# Patient Record
Sex: FEMALE | Race: WHITE | NOT HISPANIC OR LATINO
[De-identification: names, ages, dates, MRNs, and addresses within clinical notes are randomized per-mention and may not be internally consistent; named-entity substitution may affect disease eponyms.]

---

## 2017-10-10 ENCOUNTER — APPOINTMENT (OUTPATIENT)
Dept: CT IMAGING | Facility: CLINIC | Age: 67
End: 2017-10-10

## 2017-10-10 ENCOUNTER — OUTPATIENT (OUTPATIENT)
Dept: OUTPATIENT SERVICES | Facility: HOSPITAL | Age: 67
LOS: 1 days | End: 2017-10-10
Payer: MEDICARE

## 2017-10-10 DIAGNOSIS — Z00.8 ENCOUNTER FOR OTHER GENERAL EXAMINATION: ICD-10-CM

## 2017-10-10 PROCEDURE — G0297: CPT | Mod: 26

## 2017-10-10 PROCEDURE — G0297: CPT

## 2017-11-16 ENCOUNTER — APPOINTMENT (OUTPATIENT)
Dept: MAMMOGRAPHY | Facility: HOSPITAL | Age: 67
End: 2017-11-16
Payer: MEDICARE

## 2017-11-16 ENCOUNTER — OUTPATIENT (OUTPATIENT)
Dept: OUTPATIENT SERVICES | Facility: HOSPITAL | Age: 67
LOS: 1 days | End: 2017-11-16
Payer: MEDICARE

## 2017-11-16 PROCEDURE — 77067 SCR MAMMO BI INCL CAD: CPT

## 2017-11-16 PROCEDURE — 77063 BREAST TOMOSYNTHESIS BI: CPT

## 2017-11-16 PROCEDURE — G0202: CPT | Mod: 26

## 2017-11-16 PROCEDURE — 77063 BREAST TOMOSYNTHESIS BI: CPT | Mod: 26

## 2017-11-17 ENCOUNTER — APPOINTMENT (OUTPATIENT)
Dept: MAMMOGRAPHY | Facility: HOSPITAL | Age: 67
End: 2017-11-17
Payer: MEDICARE

## 2017-11-17 ENCOUNTER — OUTPATIENT (OUTPATIENT)
Dept: OUTPATIENT SERVICES | Facility: HOSPITAL | Age: 67
LOS: 1 days | End: 2017-11-17
Payer: MEDICARE

## 2017-11-17 PROCEDURE — 77065 DX MAMMO INCL CAD UNI: CPT

## 2017-11-17 PROCEDURE — G0279: CPT | Mod: 26

## 2017-11-17 PROCEDURE — G0206: CPT | Mod: 26,LT

## 2017-11-17 PROCEDURE — G0279: CPT

## 2017-11-29 DIAGNOSIS — Z00.8 ENCOUNTER FOR OTHER GENERAL EXAMINATION: ICD-10-CM

## 2017-11-30 ENCOUNTER — RESULT REVIEW (OUTPATIENT)
Age: 67
End: 2017-11-30

## 2017-11-30 ENCOUNTER — APPOINTMENT (OUTPATIENT)
Dept: MAMMOGRAPHY | Facility: CLINIC | Age: 67
End: 2017-11-30
Payer: MEDICARE

## 2017-11-30 ENCOUNTER — OUTPATIENT (OUTPATIENT)
Dept: OUTPATIENT SERVICES | Facility: HOSPITAL | Age: 67
LOS: 1 days | End: 2017-11-30
Payer: MEDICARE

## 2017-11-30 DIAGNOSIS — Z00.8 ENCOUNTER FOR OTHER GENERAL EXAMINATION: ICD-10-CM

## 2017-11-30 PROCEDURE — G0206: CPT | Mod: 26,LT

## 2017-11-30 PROCEDURE — 77065 DX MAMMO INCL CAD UNI: CPT

## 2017-11-30 PROCEDURE — 19081 BX BREAST 1ST LESION STRTCTC: CPT

## 2017-11-30 PROCEDURE — 19082 BX BREAST ADD LESION STRTCTC: CPT | Mod: LT

## 2017-11-30 PROCEDURE — A4648: CPT

## 2017-11-30 PROCEDURE — 19082 BX BREAST ADD LESION STRTCTC: CPT

## 2017-11-30 PROCEDURE — A9586: CPT

## 2017-11-30 PROCEDURE — 19081 BX BREAST 1ST LESION STRTCTC: CPT | Mod: LT

## 2018-04-30 ENCOUNTER — APPOINTMENT (OUTPATIENT)
Dept: RADIOLOGY | Facility: CLINIC | Age: 68
End: 2018-04-30
Payer: MEDICARE

## 2018-04-30 ENCOUNTER — APPOINTMENT (OUTPATIENT)
Dept: CT IMAGING | Facility: CLINIC | Age: 68
End: 2018-04-30
Payer: MEDICARE

## 2018-04-30 ENCOUNTER — OUTPATIENT (OUTPATIENT)
Dept: OUTPATIENT SERVICES | Facility: HOSPITAL | Age: 68
LOS: 1 days | End: 2018-04-30
Payer: MEDICARE

## 2018-04-30 DIAGNOSIS — Z00.8 ENCOUNTER FOR OTHER GENERAL EXAMINATION: ICD-10-CM

## 2018-04-30 PROCEDURE — 74176 CT ABD & PELVIS W/O CONTRAST: CPT

## 2018-04-30 PROCEDURE — 74018 RADEX ABDOMEN 1 VIEW: CPT | Mod: 26

## 2018-04-30 PROCEDURE — 74176 CT ABD & PELVIS W/O CONTRAST: CPT | Mod: 26

## 2018-04-30 PROCEDURE — 74018 RADEX ABDOMEN 1 VIEW: CPT

## 2018-06-11 ENCOUNTER — OUTPATIENT (OUTPATIENT)
Dept: OUTPATIENT SERVICES | Facility: HOSPITAL | Age: 68
LOS: 1 days | End: 2018-06-11
Payer: MEDICARE

## 2018-06-11 ENCOUNTER — APPOINTMENT (OUTPATIENT)
Dept: MAMMOGRAPHY | Facility: CLINIC | Age: 68
End: 2018-06-11

## 2018-06-11 DIAGNOSIS — Z00.8 ENCOUNTER FOR OTHER GENERAL EXAMINATION: ICD-10-CM

## 2018-06-11 PROCEDURE — 77065 DX MAMMO INCL CAD UNI: CPT | Mod: 26,LT

## 2018-06-11 PROCEDURE — 77065 DX MAMMO INCL CAD UNI: CPT

## 2018-10-04 ENCOUNTER — APPOINTMENT (OUTPATIENT)
Dept: CT IMAGING | Facility: CLINIC | Age: 68
End: 2018-10-04

## 2018-10-11 ENCOUNTER — OUTPATIENT (OUTPATIENT)
Dept: OUTPATIENT SERVICES | Facility: HOSPITAL | Age: 68
LOS: 1 days | End: 2018-10-11
Payer: MEDICARE

## 2018-10-11 ENCOUNTER — APPOINTMENT (OUTPATIENT)
Dept: CT IMAGING | Facility: CLINIC | Age: 68
End: 2018-10-11
Payer: MEDICARE

## 2018-10-11 DIAGNOSIS — Z00.8 ENCOUNTER FOR OTHER GENERAL EXAMINATION: ICD-10-CM

## 2018-10-11 PROCEDURE — G0297: CPT | Mod: 26

## 2018-10-11 PROCEDURE — G0297: CPT

## 2018-11-29 ENCOUNTER — APPOINTMENT (OUTPATIENT)
Dept: MAMMOGRAPHY | Facility: CLINIC | Age: 68
End: 2018-11-29
Payer: MEDICARE

## 2018-11-29 ENCOUNTER — OUTPATIENT (OUTPATIENT)
Dept: OUTPATIENT SERVICES | Facility: HOSPITAL | Age: 68
LOS: 1 days | End: 2018-11-29
Payer: MEDICARE

## 2018-11-29 DIAGNOSIS — Z00.8 ENCOUNTER FOR OTHER GENERAL EXAMINATION: ICD-10-CM

## 2018-11-29 PROCEDURE — 76641 ULTRASOUND BREAST COMPLETE: CPT | Mod: 26,50

## 2018-11-29 PROCEDURE — 77063 BREAST TOMOSYNTHESIS BI: CPT

## 2018-11-29 PROCEDURE — 77067 SCR MAMMO BI INCL CAD: CPT | Mod: 26

## 2018-11-29 PROCEDURE — 77063 BREAST TOMOSYNTHESIS BI: CPT | Mod: 26

## 2018-11-29 PROCEDURE — 77067 SCR MAMMO BI INCL CAD: CPT

## 2018-11-29 PROCEDURE — 76641 ULTRASOUND BREAST COMPLETE: CPT

## 2019-10-16 ENCOUNTER — APPOINTMENT (OUTPATIENT)
Dept: CT IMAGING | Facility: CLINIC | Age: 69
End: 2019-10-16
Payer: MEDICARE

## 2019-10-16 ENCOUNTER — OUTPATIENT (OUTPATIENT)
Dept: OUTPATIENT SERVICES | Facility: HOSPITAL | Age: 69
LOS: 1 days | End: 2019-10-16
Payer: MEDICARE

## 2019-10-16 VITALS — HEIGHT: 62 IN | WEIGHT: 158 LBS | BODY MASS INDEX: 29.08 KG/M2

## 2019-10-16 DIAGNOSIS — Z00.8 ENCOUNTER FOR OTHER GENERAL EXAMINATION: ICD-10-CM

## 2019-10-16 PROCEDURE — 99406 BEHAV CHNG SMOKING 3-10 MIN: CPT

## 2019-10-16 PROCEDURE — G0296 VISIT TO DETERM LDCT ELIG: CPT

## 2019-10-16 PROCEDURE — G0297: CPT

## 2019-10-16 PROCEDURE — G0297: CPT | Mod: 26

## 2020-01-31 ENCOUNTER — OUTPATIENT (OUTPATIENT)
Dept: OUTPATIENT SERVICES | Facility: HOSPITAL | Age: 70
LOS: 1 days | End: 2020-01-31
Payer: MEDICARE

## 2020-01-31 ENCOUNTER — APPOINTMENT (OUTPATIENT)
Dept: ULTRASOUND IMAGING | Facility: CLINIC | Age: 70
End: 2020-01-31
Payer: MEDICARE

## 2020-01-31 ENCOUNTER — APPOINTMENT (OUTPATIENT)
Dept: MAMMOGRAPHY | Facility: CLINIC | Age: 70
End: 2020-01-31
Payer: MEDICARE

## 2020-01-31 DIAGNOSIS — Z00.8 ENCOUNTER FOR OTHER GENERAL EXAMINATION: ICD-10-CM

## 2020-01-31 PROCEDURE — 76641 ULTRASOUND BREAST COMPLETE: CPT | Mod: 26,50

## 2020-01-31 PROCEDURE — 77063 BREAST TOMOSYNTHESIS BI: CPT

## 2020-01-31 PROCEDURE — 77063 BREAST TOMOSYNTHESIS BI: CPT | Mod: 26

## 2020-01-31 PROCEDURE — 77067 SCR MAMMO BI INCL CAD: CPT | Mod: 26

## 2020-01-31 PROCEDURE — 76641 ULTRASOUND BREAST COMPLETE: CPT

## 2020-01-31 PROCEDURE — 77067 SCR MAMMO BI INCL CAD: CPT

## 2021-06-22 ENCOUNTER — APPOINTMENT (OUTPATIENT)
Dept: ULTRASOUND IMAGING | Facility: CLINIC | Age: 71
End: 2021-06-22

## 2021-06-22 ENCOUNTER — APPOINTMENT (OUTPATIENT)
Dept: MAMMOGRAPHY | Facility: CLINIC | Age: 71
End: 2021-06-22

## 2021-06-22 ENCOUNTER — OUTPATIENT (OUTPATIENT)
Dept: OUTPATIENT SERVICES | Facility: HOSPITAL | Age: 71
LOS: 1 days | End: 2021-06-22
Payer: MEDICARE

## 2021-06-22 DIAGNOSIS — Z00.8 ENCOUNTER FOR OTHER GENERAL EXAMINATION: ICD-10-CM

## 2021-06-22 PROCEDURE — 76641 ULTRASOUND BREAST COMPLETE: CPT

## 2021-06-22 PROCEDURE — 77063 BREAST TOMOSYNTHESIS BI: CPT | Mod: 26

## 2021-06-22 PROCEDURE — 77067 SCR MAMMO BI INCL CAD: CPT | Mod: 26

## 2021-06-22 PROCEDURE — 77063 BREAST TOMOSYNTHESIS BI: CPT

## 2021-06-22 PROCEDURE — 77067 SCR MAMMO BI INCL CAD: CPT

## 2021-06-22 PROCEDURE — 76641 ULTRASOUND BREAST COMPLETE: CPT | Mod: 26,50

## 2022-06-06 ENCOUNTER — OUTPATIENT (OUTPATIENT)
Dept: OUTPATIENT SERVICES | Facility: HOSPITAL | Age: 72
LOS: 1 days | End: 2022-06-06
Payer: MEDICARE

## 2022-06-06 ENCOUNTER — APPOINTMENT (OUTPATIENT)
Dept: RADIOLOGY | Facility: HOSPITAL | Age: 72
End: 2022-06-06
Payer: MEDICARE

## 2022-06-06 DIAGNOSIS — Z00.8 ENCOUNTER FOR OTHER GENERAL EXAMINATION: ICD-10-CM

## 2022-06-06 PROCEDURE — 73562 X-RAY EXAM OF KNEE 3: CPT

## 2022-06-06 PROCEDURE — 73562 X-RAY EXAM OF KNEE 3: CPT | Mod: 26,RT

## 2022-08-02 ENCOUNTER — OUTPATIENT (OUTPATIENT)
Dept: OUTPATIENT SERVICES | Facility: HOSPITAL | Age: 72
LOS: 1 days | End: 2022-08-02
Payer: MEDICARE

## 2022-08-02 ENCOUNTER — APPOINTMENT (OUTPATIENT)
Dept: ULTRASOUND IMAGING | Facility: CLINIC | Age: 72
End: 2022-08-02

## 2022-08-02 ENCOUNTER — APPOINTMENT (OUTPATIENT)
Dept: MAMMOGRAPHY | Facility: CLINIC | Age: 72
End: 2022-08-02

## 2022-08-02 DIAGNOSIS — Z00.8 ENCOUNTER FOR OTHER GENERAL EXAMINATION: ICD-10-CM

## 2022-08-02 PROCEDURE — 77063 BREAST TOMOSYNTHESIS BI: CPT

## 2022-08-02 PROCEDURE — 76641 ULTRASOUND BREAST COMPLETE: CPT | Mod: 26,50

## 2022-08-02 PROCEDURE — 77067 SCR MAMMO BI INCL CAD: CPT

## 2022-08-02 PROCEDURE — 77067 SCR MAMMO BI INCL CAD: CPT | Mod: 26

## 2022-08-02 PROCEDURE — 76641 ULTRASOUND BREAST COMPLETE: CPT

## 2022-08-02 PROCEDURE — 77063 BREAST TOMOSYNTHESIS BI: CPT | Mod: 26

## 2022-09-22 ENCOUNTER — INPATIENT (INPATIENT)
Facility: HOSPITAL | Age: 72
LOS: 14 days | Discharge: HOME | DRG: 545 | End: 2022-10-07
Attending: STUDENT IN AN ORGANIZED HEALTH CARE EDUCATION/TRAINING PROGRAM | Admitting: STUDENT IN AN ORGANIZED HEALTH CARE EDUCATION/TRAINING PROGRAM
Payer: MEDICARE

## 2022-09-22 VITALS
RESPIRATION RATE: 45 BRPM | HEIGHT: 65 IN | SYSTOLIC BLOOD PRESSURE: 209 MMHG | OXYGEN SATURATION: 98 % | HEART RATE: 115 BPM | WEIGHT: 145.06 LBS | DIASTOLIC BLOOD PRESSURE: 96 MMHG

## 2022-09-22 DIAGNOSIS — I50.9 HEART FAILURE, UNSPECIFIED: ICD-10-CM

## 2022-09-22 LAB
ALBUMIN SERPL ELPH-MCNC: 4 G/DL — SIGNIFICANT CHANGE UP (ref 3.3–5)
ALP SERPL-CCNC: 65 U/L — SIGNIFICANT CHANGE UP (ref 30–120)
ALT FLD-CCNC: 26 U/L DA — SIGNIFICANT CHANGE UP (ref 10–60)
ANION GAP SERPL CALC-SCNC: 9 MMOL/L — SIGNIFICANT CHANGE UP (ref 5–17)
APPEARANCE UR: CLEAR — SIGNIFICANT CHANGE UP
AST SERPL-CCNC: 25 U/L — SIGNIFICANT CHANGE UP (ref 10–40)
BASOPHILS # BLD AUTO: 0.01 K/UL — SIGNIFICANT CHANGE UP (ref 0–0.2)
BASOPHILS NFR BLD AUTO: 0.1 % — SIGNIFICANT CHANGE UP (ref 0–2)
BILIRUB SERPL-MCNC: 0.4 MG/DL — SIGNIFICANT CHANGE UP (ref 0.2–1.2)
BILIRUB UR-MCNC: NEGATIVE — SIGNIFICANT CHANGE UP
BUN SERPL-MCNC: 61 MG/DL — HIGH (ref 7–23)
CALCIUM SERPL-MCNC: 9.3 MG/DL — SIGNIFICANT CHANGE UP (ref 8.4–10.5)
CHLORIDE SERPL-SCNC: 101 MMOL/L — SIGNIFICANT CHANGE UP (ref 96–108)
CO2 SERPL-SCNC: 24 MMOL/L — SIGNIFICANT CHANGE UP (ref 22–31)
COLOR SPEC: YELLOW — SIGNIFICANT CHANGE UP
CREAT SERPL-MCNC: 2.8 MG/DL — HIGH (ref 0.5–1.3)
CRP SERPL-MCNC: <3 MG/L — SIGNIFICANT CHANGE UP
D DIMER BLD IA.RAPID-MCNC: 241 NG/ML DDU — HIGH
DIFF PNL FLD: NEGATIVE — SIGNIFICANT CHANGE UP
EGFR: 17 ML/MIN/1.73M2 — LOW
EOSINOPHIL # BLD AUTO: 0.19 K/UL — SIGNIFICANT CHANGE UP (ref 0–0.5)
EOSINOPHIL NFR BLD AUTO: 1.9 % — SIGNIFICANT CHANGE UP (ref 0–6)
ERYTHROCYTE [SEDIMENTATION RATE] IN BLOOD: 41 MM/HR — HIGH (ref 0–20)
GLUCOSE SERPL-MCNC: 137 MG/DL — HIGH (ref 70–99)
GLUCOSE UR QL: 50 MG/DL
HCT VFR BLD CALC: 37.8 % — SIGNIFICANT CHANGE UP (ref 34.5–45)
HGB BLD-MCNC: 12.3 G/DL — SIGNIFICANT CHANGE UP (ref 11.5–15.5)
IMM GRANULOCYTES NFR BLD AUTO: 0.3 % — SIGNIFICANT CHANGE UP (ref 0–0.9)
KETONES UR-MCNC: NEGATIVE — SIGNIFICANT CHANGE UP
LACTATE SERPL-SCNC: 0.3 MMOL/L — LOW (ref 0.7–2)
LACTATE SERPL-SCNC: 2.4 MMOL/L — HIGH (ref 0.7–2)
LEUKOCYTE ESTERASE UR-ACNC: NEGATIVE — SIGNIFICANT CHANGE UP
LYMPHOCYTES # BLD AUTO: 3.46 K/UL — HIGH (ref 1–3.3)
LYMPHOCYTES # BLD AUTO: 35 % — SIGNIFICANT CHANGE UP (ref 13–44)
MAGNESIUM SERPL-MCNC: 2.2 MG/DL — SIGNIFICANT CHANGE UP (ref 1.6–2.6)
MCHC RBC-ENTMCNC: 32.2 PG — SIGNIFICANT CHANGE UP (ref 27–34)
MCHC RBC-ENTMCNC: 32.5 GM/DL — SIGNIFICANT CHANGE UP (ref 32–36)
MCV RBC AUTO: 99 FL — SIGNIFICANT CHANGE UP (ref 80–100)
MONOCYTES # BLD AUTO: 0.86 K/UL — SIGNIFICANT CHANGE UP (ref 0–0.9)
MONOCYTES NFR BLD AUTO: 8.7 % — SIGNIFICANT CHANGE UP (ref 2–14)
NEUTROPHILS # BLD AUTO: 5.34 K/UL — SIGNIFICANT CHANGE UP (ref 1.8–7.4)
NEUTROPHILS NFR BLD AUTO: 54 % — SIGNIFICANT CHANGE UP (ref 43–77)
NITRITE UR-MCNC: NEGATIVE — SIGNIFICANT CHANGE UP
NRBC # BLD: 0 /100 WBCS — SIGNIFICANT CHANGE UP (ref 0–0)
NT-PROBNP SERPL-SCNC: HIGH PG/ML (ref 0–125)
PH UR: 6 — SIGNIFICANT CHANGE UP (ref 5–8)
PLATELET # BLD AUTO: 256 K/UL — SIGNIFICANT CHANGE UP (ref 150–400)
POTASSIUM SERPL-MCNC: 5.7 MMOL/L — HIGH (ref 3.5–5.3)
POTASSIUM SERPL-SCNC: 5.7 MMOL/L — HIGH (ref 3.5–5.3)
PROT SERPL-MCNC: 8.9 G/DL — HIGH (ref 6–8.3)
PROT UR-MCNC: 15 MG/DL
RAPID RVP RESULT: SIGNIFICANT CHANGE UP
RBC # BLD: 3.82 M/UL — SIGNIFICANT CHANGE UP (ref 3.8–5.2)
RBC # FLD: 14.9 % — HIGH (ref 10.3–14.5)
SARS-COV-2 RNA SPEC QL NAA+PROBE: SIGNIFICANT CHANGE UP
SODIUM SERPL-SCNC: 134 MMOL/L — LOW (ref 135–145)
SP GR SPEC: 1 — LOW (ref 1.01–1.02)
TROPONIN I, HIGH SENSITIVITY RESULT: 192.7 NG/L — HIGH
TROPONIN I, HIGH SENSITIVITY RESULT: 95.1 NG/L — HIGH
TSH SERPL-MCNC: 16.4 UIU/ML — HIGH (ref 0.27–4.2)
UROBILINOGEN FLD QL: NEGATIVE MG/DL — SIGNIFICANT CHANGE UP
WBC # BLD: 9.89 K/UL — SIGNIFICANT CHANGE UP (ref 3.8–10.5)
WBC # FLD AUTO: 9.89 K/UL — SIGNIFICANT CHANGE UP (ref 3.8–10.5)

## 2022-09-22 PROCEDURE — 99285 EMERGENCY DEPT VISIT HI MDM: CPT

## 2022-09-22 PROCEDURE — 71250 CT THORAX DX C-: CPT | Mod: 26,MA

## 2022-09-22 PROCEDURE — 99223 1ST HOSP IP/OBS HIGH 75: CPT

## 2022-09-22 PROCEDURE — 76775 US EXAM ABDO BACK WALL LIM: CPT | Mod: 26

## 2022-09-22 PROCEDURE — 74176 CT ABD & PELVIS W/O CONTRAST: CPT | Mod: 26,MA

## 2022-09-22 PROCEDURE — 71045 X-RAY EXAM CHEST 1 VIEW: CPT | Mod: 26

## 2022-09-22 PROCEDURE — 93306 TTE W/DOPPLER COMPLETE: CPT | Mod: 26

## 2022-09-22 RX ORDER — SODIUM CHLORIDE 9 MG/ML
1000 INJECTION INTRAMUSCULAR; INTRAVENOUS; SUBCUTANEOUS ONCE
Refills: 0 | Status: COMPLETED | OUTPATIENT
Start: 2022-09-22 | End: 2022-09-22

## 2022-09-22 RX ORDER — IPRATROPIUM/ALBUTEROL SULFATE 18-103MCG
3 AEROSOL WITH ADAPTER (GRAM) INHALATION EVERY 8 HOURS
Refills: 0 | Status: DISCONTINUED | OUTPATIENT
Start: 2022-09-22 | End: 2022-09-29

## 2022-09-22 RX ORDER — IPRATROPIUM/ALBUTEROL SULFATE 18-103MCG
3 AEROSOL WITH ADAPTER (GRAM) INHALATION
Refills: 0 | Status: DISCONTINUED | OUTPATIENT
Start: 2022-09-22 | End: 2022-09-29

## 2022-09-22 RX ORDER — CALCIUM GLUCONATE 100 MG/ML
1 VIAL (ML) INTRAVENOUS ONCE
Refills: 0 | Status: COMPLETED | OUTPATIENT
Start: 2022-09-22 | End: 2022-09-22

## 2022-09-22 RX ORDER — FUROSEMIDE 40 MG
80 TABLET ORAL ONCE
Refills: 0 | Status: COMPLETED | OUTPATIENT
Start: 2022-09-22 | End: 2022-09-22

## 2022-09-22 RX ORDER — LEVOTHYROXINE SODIUM 125 MCG
75 TABLET ORAL DAILY
Refills: 0 | Status: DISCONTINUED | OUTPATIENT
Start: 2022-09-22 | End: 2022-09-29

## 2022-09-22 RX ORDER — NITROGLYCERIN 6.5 MG
0.4 CAPSULE, EXTENDED RELEASE ORAL
Refills: 0 | Status: DISCONTINUED | OUTPATIENT
Start: 2022-09-22 | End: 2022-09-29

## 2022-09-22 RX ORDER — NICOTINE POLACRILEX 2 MG
1 GUM BUCCAL DAILY
Refills: 0 | Status: DISCONTINUED | OUTPATIENT
Start: 2022-09-22 | End: 2022-09-29

## 2022-09-22 RX ORDER — FUROSEMIDE 40 MG
40 TABLET ORAL DAILY
Refills: 0 | Status: DISCONTINUED | OUTPATIENT
Start: 2022-09-23 | End: 2022-09-23

## 2022-09-22 RX ORDER — HYDRALAZINE HCL 50 MG
10 TABLET ORAL EVERY 6 HOURS
Refills: 0 | Status: DISCONTINUED | OUTPATIENT
Start: 2022-09-22 | End: 2022-09-23

## 2022-09-22 RX ORDER — ASPIRIN/CALCIUM CARB/MAGNESIUM 324 MG
162 TABLET ORAL ONCE
Refills: 0 | Status: DISCONTINUED | OUTPATIENT
Start: 2022-09-22 | End: 2022-09-22

## 2022-09-22 RX ORDER — INSULIN HUMAN 100 [IU]/ML
6 INJECTION, SOLUTION SUBCUTANEOUS ONCE
Refills: 0 | Status: COMPLETED | OUTPATIENT
Start: 2022-09-22 | End: 2022-09-22

## 2022-09-22 RX ORDER — IPRATROPIUM/ALBUTEROL SULFATE 18-103MCG
3 AEROSOL WITH ADAPTER (GRAM) INHALATION ONCE
Refills: 0 | Status: COMPLETED | OUTPATIENT
Start: 2022-09-22 | End: 2022-09-22

## 2022-09-22 RX ORDER — HEPARIN SODIUM 5000 [USP'U]/ML
5000 INJECTION INTRAVENOUS; SUBCUTANEOUS EVERY 8 HOURS
Refills: 0 | Status: DISCONTINUED | OUTPATIENT
Start: 2022-09-22 | End: 2022-09-29

## 2022-09-22 RX ORDER — DEXTROSE 50 % IN WATER 50 %
50 SYRINGE (ML) INTRAVENOUS ONCE
Refills: 0 | Status: COMPLETED | OUTPATIENT
Start: 2022-09-22 | End: 2022-09-22

## 2022-09-22 RX ORDER — ACETAMINOPHEN 500 MG
650 TABLET ORAL EVERY 6 HOURS
Refills: 0 | Status: DISCONTINUED | OUTPATIENT
Start: 2022-09-22 | End: 2022-09-29

## 2022-09-22 RX ORDER — ASPIRIN/CALCIUM CARB/MAGNESIUM 324 MG
324 TABLET ORAL ONCE
Refills: 0 | Status: DISCONTINUED | OUTPATIENT
Start: 2022-09-22 | End: 2022-09-22

## 2022-09-22 RX ADMIN — Medication 80 MILLIGRAM(S): at 07:03

## 2022-09-22 RX ADMIN — Medication 650 MILLIGRAM(S): at 21:33

## 2022-09-22 RX ADMIN — Medication 50 MILLILITER(S): at 07:48

## 2022-09-22 RX ADMIN — Medication 100 GRAM(S): at 07:53

## 2022-09-22 RX ADMIN — Medication 60 MILLIGRAM(S): at 16:39

## 2022-09-22 RX ADMIN — SODIUM CHLORIDE 1000 MILLILITER(S): 9 INJECTION INTRAMUSCULAR; INTRAVENOUS; SUBCUTANEOUS at 07:48

## 2022-09-22 RX ADMIN — Medication 3 MILLILITER(S): at 07:54

## 2022-09-22 RX ADMIN — Medication 1 PATCH: at 21:32

## 2022-09-22 RX ADMIN — Medication 1 GRAM(S): at 08:30

## 2022-09-22 RX ADMIN — HEPARIN SODIUM 5000 UNIT(S): 5000 INJECTION INTRAVENOUS; SUBCUTANEOUS at 16:03

## 2022-09-22 RX ADMIN — Medication 3 MILLILITER(S): at 15:21

## 2022-09-22 RX ADMIN — Medication 650 MILLIGRAM(S): at 22:30

## 2022-09-22 RX ADMIN — Medication 3 MILLILITER(S): at 20:47

## 2022-09-22 RX ADMIN — INSULIN HUMAN 6 UNIT(S): 100 INJECTION, SOLUTION SUBCUTANEOUS at 07:49

## 2022-09-22 RX ADMIN — SODIUM CHLORIDE 1000 MILLILITER(S): 9 INJECTION INTRAMUSCULAR; INTRAVENOUS; SUBCUTANEOUS at 08:30

## 2022-09-22 RX ADMIN — HEPARIN SODIUM 5000 UNIT(S): 5000 INJECTION INTRAVENOUS; SUBCUTANEOUS at 21:33

## 2022-09-22 NOTE — CONSULT NOTE ADULT - ASSESSMENT
The patient is a 72 year old female with a history of HTN, HL, hypothyroidism who presents with shortness of breath.    Plan:  - ECG with nonspecific findings but no evidence of ischemia or infarction  - Troponin mildly elevated at 95 in the setting of demand ischemia from heart failure and respiratory failure. Check second set.  - CXR with mild congestion  - CT chest with mild pulm edema and trace pleural effusions  - Check echo  - Continue furosemide 40 mg IV daily  - Hold spironolactone and ramipril  - Continue aspirin 81 mg daily  - Continue atorvastatin 20 mg daily (formulary equivalent)  - Pulm eval  - BIPAP

## 2022-09-22 NOTE — ED PROVIDER NOTE - OBJECTIVE STATEMENT
72 y.o. F presents by EMS c/o not feeling well, initial call to EMS was for difficulty breathing, at home family mentioned possible facial droop, pt in significant respiratory distress at this time, unable to give more history than to state she isn't feeling well and calling for her mother

## 2022-09-22 NOTE — ED PROVIDER NOTE - RESPIRATORY, MLM
tachypneic, using accessory muscles, in mid 70s on SpO2 (pt pulling off NRB), diffuse end expiratory wheeze

## 2022-09-22 NOTE — PROGRESS NOTE ADULT - SUBJECTIVE AND OBJECTIVE BOX
Patient is a 72y old  Female who presents with a chief complaint of SOB (22 Sep 2022 13:09)    BRIEF HOSPITAL COURSE:   72F CAD, Lupus, RA, Hypothyroidism, HTN, CKD3, and recent COVID in 2022 comes in today complaining of SOB on exertion. CT Chest w/ pulmonary edema, lasix given.  Placed on BIPAP initially as she was hypoxic and later de-escalated to Nasal Canula and comfortable. Admitted to SPCU for further management.     Events last 24 hours:   -Weaned off BiPAP to NC earlier this afternoon.   -TTE w/ LVEF 45-50%.  -Renal US w/o hydronephrosis.  -Afebrile.     PAST MEDICAL & SURGICAL HISTORY:  COPD exacerbation    Review of Systems:  CONSTITUTIONAL: No fever, chills, or fatigue  EYES: No eye pain, visual disturbances, or discharge  ENMT:  No difficulty hearing, tinnitus, vertigo; No sinus or throat pain  NECK: No pain or stiffness  RESPIRATORY: No cough, wheezing, chills or hemoptysis; No shortness of breath  CARDIOVASCULAR: No chest pain, palpitations, dizziness, or leg swelling  GASTROINTESTINAL: No abdominal or epigastric pain. No nausea, vomiting, or hematemesis; No diarrhea or constipation. No melena or hematochezia.  GENITOURINARY: No dysuria, frequency, hematuria, or incontinence  NEUROLOGICAL: No headaches, memory loss, loss of strength, numbness, or tremors  SKIN: No itching, burning, rashes, or lesions   MUSCULOSKELETAL: No joint pain or swelling; No muscle, back, or extremity pain  PSYCHIATRIC: No depression, anxiety, mood swings, or difficulty sleeping    Medications:  hydrALAZINE Injectable 10 milliGRAM(s) IV Push every 6 hours PRN  nitroglycerin     SubLingual 0.4 milliGRAM(s) SubLingual every 5 minutes PRN  albuterol/ipratropium for Nebulization 3 milliLiter(s) Nebulizer every 8 hours  albuterol/ipratropium for Nebulization 3 milliLiter(s) Nebulizer every 3 hours PRN  acetaminophen     Tablet .. 650 milliGRAM(s) Oral every 6 hours PRN  heparin   Injectable 5000 Unit(s) SubCutaneous every 8 hours  levothyroxine 75 MICROGram(s) Oral daily  methylPREDNISolone sodium succinate Injectable 60 milliGRAM(s) IV Push daily  nicotine -  14 mG/24Hr(s) Patch 1 Patch Transdermal daily    ICU Vital Signs Last 24 Hrs  T(C): 36.8 (22 Sep 2022 19:41), Max: 36.9 (22 Sep 2022 16:05)  T(F): 98.2 (22 Sep 2022 19:41), Max: 98.5 (22 Sep 2022 16:05)  HR: 83 (22 Sep 2022 20:00) (71 - 115)  BP: 158/66 (22 Sep 2022 20:00) (135/67 - 209/96)  BP(mean): 93 (22 Sep 2022 20:00) (81 - 108)  ABP: --  ABP(mean): --  RR: 19 (22 Sep 2022 20:00) (17 - 45)  SpO2: 100% (22 Sep 2022 20:00) (96% - 100%)  O2 Parameters below as of 22 Sep 2022 20:00  Patient On (Oxygen Delivery Method): nasal cannula  O2 Flow (L/min): 2    I&O's Detail  22 Sep 2022 07:01  -  22 Sep 2022 20:44  --------------------------------------------------------  IN:  Total IN: 0 mL  OUT:    Voided (mL): 2660 mL  Total OUT: 2660 mL  Total NET: -2660 mL    LABS:                      12.3   9.89  )-----------( 256      ( 22 Sep 2022 06:55 )             37.8       134<L>  |  101  |  61<H>  ----------------------------<  137<H>  5.7<H>   |  24  |  2.80<H>  Ca    9.3      22 Sep 2022 06:55  Mg     2.2       TPro  8.9<H>  /  Alb  4.0  /  TBili  0.4  /  DBili  x   /  AST  25  /  ALT  26  /  AlkPhos  65      CAPILLARY BLOOD GLUCOSE  POCT Blood Glucose.: 113 mg/dL (22 Sep 2022 08:51)    Urinalysis Basic - ( 22 Sep 2022 09:07 )  Color: Yellow / Appearance: Clear / S.005 / pH: x  Gluc: x / Ketone: Negative  / Bili: Negative / Urobili: Negative mg/dL   Blood: x / Protein: 15 mg/dL / Nitrite: Negative   Leuk Esterase: Negative / RBC: 0-2 /HPF / WBC 0-2   Sq Epi: x / Non Sq Epi: Negative / Bacteria: Negative    CULTURES:  Rapid RVP Result: NotDetec (22 @ 07:00)      Physical Examination:  General: Alert, oriented, interactive, nonfocal  HEENT: PERRL.  PULM: Decreased BS at bases b/l.  CVS: s1/s2.  ABD: Soft, nondistended, nontender, normoactive bowel sounds.  EXT: No edema, nontender.  SKIN: Warm.    RADIOLOGY:   < from: US Transthoracic Echocardiogram w/Doppler Complete (22 @ 15:30) >  ACC: [id number] EXAM:  US TTE W DOPPLER COMPLETE                        PROCEDURE DATE:  2022    IMPRESSION:  1. Mild left ventricular hypertrophy, mild global left ventricular   systolic dysfunction, ejection fraction 45-50% with mild diastolic   dysfunction  2. aortic sclerosis, moderate aortic regurgitation  3. mitral annular calcification, calcified mitral leaflet with normal   opening. Moderate mitral regurgitation  4. Mild tricuspid regurgitation with estimated right ventricular systolic   pressure 37 mmHg    < from: CT Abdomen and Pelvis No Cont (22 @ 08:42) >  ACC: [id number] EXAM:  CT ABDOMEN AND PELVIS                        ACC: [id number] EXAM:  CT CHEST                        PROCEDURE DATE:  2022    IMPRESSION:  *  Pulmonary edema and trace bilateral pleural effusions.  *  No hydronephrosis or urinary tract calculi. Bilateralextrarenal   pelvis again noted.  *  Mildly dilated fluid-filled esophagus. Aspiration precautions are   advised.  *  Stable 3.4 cm left adnexal mass since 2018 remains indeterminate for   neoplasm.  *  Mild cystocele at rest. Further evaluation for pelvic floor   insufficiency is recommended.      72F CAD, Lupus, RA, Hypothyroidism, HTN, CKD3, and recent COVID in 2022 comes in today complaining of SOB on exertion. CT Chest w/ pulmonary edema, lasix given.  Placed on BIPAP initially as she was hypoxic and later de-escalated to Nasal Canula and comfortable. Admitted to SPCU for further management.   Assessment:  1. Acute Hypoxic Respiratory Failure  2. Acute Pulmonary Edema  3. Acute HFrEF  4. NED  5. Troponinemia    Plan:  NEURO:  -No acute issues.    CV:  -Maintain goal MAP >65.  -Keep K~4 and Mg>2 for optimal arrhythmia suppression   -TTE w/ LVEF 45-50%. Cards following, recs appreciated. Lasix QD.  -Elevated Troponin likely 2/2 demand ischemia. Trend serial Trops/EKGs.  -Hydralazine IVP PRN for HTN.     RESP:  -Satting well on NC, goal SpO2 >88%. NIPPV PRN.  -Albuterol PRN, Solu-medrol IVP QD.    RENAL:  -Renal function currently w/ NED (BL unknown).  -trend lytes/Scr daily with BMP  -I's and O's, goal UOP 0.5 cc/kg/hr  -renal dose meds and avoid nephrotoxins   -Nephro following, recs appreciated.     GI:  -Regular diet.    ENDO:  -Aggressive glycemic control to limit FS glucose to <180mg/dl. BS WNL.  -Synthroid for Hypothyroidism.     ID:  -Afebrile. BloodCx sent. Monitor off abx.    HEME:  -DVT ppx w/ SQH.     DISPO: Full Code.     TIME SPENT: 36 minutes  Evaluating/treating patient, reviewing data/labs/imaging, discussing case with multidisciplinary team, discussing plan/goals of care with patient/family. Non-inclusive of procedure time.

## 2022-09-22 NOTE — H&P ADULT - ASSESSMENT
72F CAD, Lupus, Hypothyroidism, HTN, CKD3, and recent COVID in June 2022 admitted for Acute Hypoxic Respiratory Failure.     Acute Hypoxic Respiratory Failure  CT Chest reviewed. Has Pulmonary Edema and history of CAD; Echo to rule out decline in EF  Given history of MCTD will need to be concerned for ILD and PAH  Lasix IV Daily and monitor Urine output; Supplemental O2 PRN   Pulmonary and Cardiology Consultation noted     Acute Renal Failure on CKD 3   Baseline Creatinine around 1.0; Check Renal US  Could be Pre-Renal and also consider Scleroderma Renal Crisis  Monitor BMP and Electrolytes     MCTD   Physical Exam findings consistent with Scleroderma and SLE   Will reach out to Rheumatology to confirm this diagnosis   Check VICENTE, RNP, SCL 70, RF, ESR and Centromere   Will need to consider Scleroderma Renal Crisis  Continue Steroids    CAD / HTN   Patient BP uncontrolled; History of PCI  Lasix and Hydralazine; Hold ACE I but may resume after discussion with Rheum and Nephro  Echo Pending    Hypothyroidism  Repeat TSH and Free T4  Synthroid    Diet  Regular    DVT Prophylaxis  Heparin SC     Disposition   Discharge planning pending hospital course

## 2022-09-22 NOTE — CONSULT NOTE ADULT - SUBJECTIVE AND OBJECTIVE BOX
HPI  Patient is a 72y old  Female with a hx of valvular heart dz, decree unclear ( required prophylaxis prior to dental procedures ) who presented to ED earlier today after acute onset dyspnea at 2am.   High pro-BNP, CXR + chest CT signif for pulm edema. Found to be in NED, Cr 2.8 associated with mild hyperK. No prior hx of CKD. Cr was NL in  of this year per oupt records.   CT A/P without hydro. UA bland. No recorded hypotension. BP NL to high in ED.   Home meds include MRA, low dose ACE-I.   Renal evaluation requested to address NED, hyperK  Of note, pt's daughter relates few months of poor appetite, wt loss but no issue with fluid intake.   No recent angiography, NSAIDs, Abx        PAST MEDICAL & SURGICAL HISTORY:  COPD   CAD/stent  HTN  valvular heart dz        Allergies:  No Known Allergies          MEDICATIONS  (STANDING):  albuterol/ipratropium for Nebulization 3 milliLiter(s) Nebulizer every 8 hours  heparin   Injectable 5000 Unit(s) SubCutaneous every 8 hours  methylPREDNISolone sodium succinate Injectable 60 milliGRAM(s) IV Push daily    MEDICATIONS  (PRN):  acetaminophen     Tablet .. 650 milliGRAM(s) Oral every 6 hours PRN Temp greater or equal to 38C (100.4F), Mild Pain (1 - 3)  albuterol/ipratropium for Nebulization 3 milliLiter(s) Nebulizer every 3 hours PRN Shortness of Breath and/or Wheezing  nitroglycerin     SubLingual 0.4 milliGRAM(s) SubLingual every 5 minutes PRN Chest Pain      Daily Height in cm: 165.1 (22 Sep 2022 06:48)    Daily     Drug Dosing Weight  Height (cm): 165.1 (22 Sep 2022 06:48)  Weight (kg): 65.8 (22 Sep 2022 06:48)  BMI (kg/m2): 24.1 (22 Sep 2022 06:48)  BSA (m2): 1.73 (22 Sep 2022 06:48)      REVIEW OF SYSTEMS:    Sudden onset dyspnea  NED            I&O's Detail    22 Sep 2022 07:01  -  22 Sep 2022 13:10  --------------------------------------------------------  IN:  Total IN: 0 mL    OUT:    Voided (mL): 1660 mL  Total OUT: 1660 mL    Total NET: -1660 mL           @ 07:01  -   @ 13:10  --------------------------------------------------------  IN: 0 mL / OUT: 1660 mL / NET: -1660 mL        PHYSICAL EXAM:    GENERAL: anxious  NECK: Supple. No increase in JVP  CHEST/LUNG: crackles  HEART: pos M  ABDOMEN: Soft, Nontender, Nondistended. POS BS  EXTREMITIES:  no edema      LABS:  CBC Full  -  ( 22 Sep 2022 06:55 )  WBC Count : 9.89 K/uL  RBC Count : 3.82 M/uL  Hemoglobin : 12.3 g/dL  Hematocrit : 37.8 %  Platelet Count - Automated : 256 K/uL  Mean Cell Volume : 99.0 fl  Mean Cell Hemoglobin : 32.2 pg  Mean Cell Hemoglobin Concentration : 32.5 gm/dL  Auto Neutrophil # : 5.34 K/uL  Auto Lymphocyte # : 3.46 K/uL  Auto Monocyte # : 0.86 K/uL  Auto Eosinophil # : 0.19 K/uL  Auto Basophil # : 0.01 K/uL  Auto Neutrophil % : 54.0 %  Auto Lymphocyte % : 35.0 %  Auto Monocyte % : 8.7 %  Auto Eosinophil % : 1.9 %  Auto Basophil % : 0.1 %        134<L>  |  101  |  61<H>  ----------------------------<  137<H>  5.7<H>   |  24  |  2.80<H>    Ca    9.3      22 Sep 2022 06:55  Mg     2.2         TPro  8.9<H>  /  Alb  4.0  /  TBili  0.4  /  DBili  x   /  AST  25  /  ALT  26  /  AlkPhos  65      CAPILLARY BLOOD GLUCOSE      POCT Blood Glucose.: 113 mg/dL (22 Sep 2022 08:51)      Urinalysis Basic - ( 22 Sep 2022 09:07 )    Color: Yellow / Appearance: Clear / S.005 / pH: x  Gluc: x / Ketone: Negative  / Bili: Negative / Urobili: Negative mg/dL   Blood: x / Protein: 15 mg/dL / Nitrite: Negative   Leuk Esterase: Negative / RBC: 0-2 /HPF / WBC 0-2   Sq Epi: x / Non Sq Epi: Negative / Bacteria: Negative        Impression:  * NED -- suspect cardio-renal dysregulation.   * HyperK -- NED, ACE-I + MRA effect  * Acute pulm edema. Exclude new decline in EF, valvular dz    Recommendations:   * Cont judicious diuresis  * Obtain 2 D Echo to eval LVFx, valvular pathology  * Hold Aldactone, ACE-I  * Expect K to improve on IV diuretic.   * Low K diet.     Thank you~

## 2022-09-22 NOTE — ED PROVIDER NOTE - CARE PLAN
Principal Discharge DX:	Shortness of breath   1 Principal Discharge DX:	Acute CHF  Secondary Diagnosis:	Acute renal failure

## 2022-09-22 NOTE — PROVIDER CONTACT NOTE (CRITICAL VALUE NOTIFICATION) - ACTION/TREATMENT ORDERED:
pending order, continue patient care. pending order, continue patient care.  Hospitalist aware of troponin 192.7 will admit to SPCU and cardiology notified.

## 2022-09-22 NOTE — ED ADULT NURSE REASSESSMENT NOTE - NS ED NURSE REASSESS COMMENT FT1
patient is responding breathing treatment and getting result back from blood work, patient stated she feels better, Pt is in no acute distress. denies any pain, will continue to monitor and provide plan of care.

## 2022-09-22 NOTE — PATIENT PROFILE ADULT - FALL HARM RISK - HARM RISK INTERVENTIONS

## 2022-09-22 NOTE — H&P ADULT - HISTORY OF PRESENT ILLNESS
72F CAD, Lupus, RA, Hypothyroidism, HTN, CKD3, and recent COVID in June 2022 comes in today complaining of SOB on exertion.   Patient was in usual state of health up until few days prior when she started to notice progressively worsening SOB.  Denies any chest pain, or orthopnea. No recent illness or sick contacts. No changes in medications.     Placed on BIPAP initially as she was hypoxic and later de-escalated to Nasal Canula and comfortable.  Able to talk and give me history.  Routine labs done showing elevated BNP and Creatinine. CT Chest shows pulmonary edema and lasix given. Patient admitted for further management.

## 2022-09-22 NOTE — ED ADULT NURSE NOTE - OBJECTIVE STATEMENT
Pt BIBA from home for difficulty breathing, per EMS, Pt went to bed with no complaints and woke up this morning have trouble breathing, former smoker Pt BIBA from home for difficulty breathing, per EMS, Pt went to bed with no complaints and woke up this morning have trouble breathing, former smoker. hx of HTN, hypertensive on arrival, pt unable to provide much info at this time due to difficulty breathing, placed on BIpap

## 2022-09-22 NOTE — ED ADULT TRIAGE NOTE - HEIGHT IN INCHES
Pt out of medication and in need of a neb treatment     IPRAT-ALBUT 0.5-3(2.5) MG/3 ML       Last Written Prescription Date:  2/26/21  Last Fill Quantity: 360 mL,   # refills: 0  Last Office Visit: 3/31/21  Future Office visit:       Routing refill request to provider for review/approval because:  Short-Acting Beta Agonist Inhalers Protocol Failed    Asthma control assessment score within normal limits in last 6 months    No flowsheet data found.      
5

## 2022-09-22 NOTE — ED PROVIDER NOTE - PROGRESS NOTE DETAILS
Breathing easier, BP improved. O2 sats 98%. CXR CHF. Labs renal failure. Etiology unclear. Seen by Ana. Will order CT, echo. Shalshin and Sophia coming to see pt. Plan - Admit.

## 2022-09-22 NOTE — CONSULT NOTE ADULT - SUBJECTIVE AND OBJECTIVE BOX
History of Present Illness:    Past Medical/Surgical History:    Medications:  Home Medications:  Aldactone 25 mg oral tablet: 1 tab(s) orally once a day (22 Sep 2022 07:36)  aspirin 81 mg oral tablet, chewable: 1 tab(s) orally once a day (22 Sep 2022 07:36)  Crestor 10 mg oral tablet: 1 tab(s) orally once a day (22 Sep 2022 07:36)  predniSONE 5 mg oral tablet: 1 tab(s) orally once a day (22 Sep 2022 07:36)  Prevacid 30 mg oral delayed release capsule: 1 cap(s) orally once a day (22 Sep 2022 07:36)  ramipril 2.5 mg oral capsule: 1 cap(s) orally once a day (22 Sep 2022 07:36)  Synthroid 50 mcg (0.05 mg) oral tablet: 1 tab(s) orally once a day (22 Sep 2022 07:36)  Verelan 120 mg/24 hours oral capsule, extended release: 1 cap(s) orally once a day (22 Sep 2022 07:36)  vitamin d 1000: orally once a day (22 Sep 2022 07:36)      Family History: Non-contributory family history of premature cardiovascular atherosclerotic disease    Social History: +current tobacco use    Review of Systems:  General: No fevers, chills, weight gain  Skin: No rashes, color changes  Cardiovascular: No chest pain, orthopnea  Respiratory: No shortness of breath, cough  Gastrointestinal: No nausea, abdominal pain  Genitourinary: No incontinence, pain with urination  Musculoskeletal: No pain, swelling, decreased range of motion  Neurological: No headache, weakness  Psychiatric: No depression, anxiety  Endocrine: No weight gain, increased thirst  All other systems are comprehensively negative.    Physical Exam:  Vitals:        Vital Signs Last 24 Hrs  T(C): 36.6 (22 Sep 2022 07:57), Max: 36.6 (22 Sep 2022 07:57)  T(F): 97.8 (22 Sep 2022 07:57), Max: 97.8 (22 Sep 2022 07:57)  HR: 80 (22 Sep 2022 07:57) (80 - 115)  BP: 152/71 (22 Sep 2022 07:57) (152/71 - 209/96)  BP(mean): --  RR: 25 (22 Sep 2022 07:57) (25 - 45)  SpO2: 100% (22 Sep 2022 07:57) (96% - 100%)  Parameters below as of 22 Sep 2022 07:57  Patient On (Oxygen Delivery Method): BiPAP/CPAP  General: NAD  HEENT: MMM  Neck: No JVD, no carotid bruit  Lungs: CTAB  CV: RRR, nl S1/S2, no M/R/G  Abdomen: S/NT/ND, +BS  Extremities: No LE edema, no cyanosis  Neuro: AAOx3, non-focal  Skin: No rash    Labs:                        12.3   9.89  )-----------( 256      ( 22 Sep 2022 06:55 )             37.8     09-22    134<L>  |  101  |  61<H>  ----------------------------<  137<H>  5.7<H>   |  24  |  2.80<H>    Ca    9.3      22 Sep 2022 06:55  Mg     2.2     09-22    TPro  8.9<H>  /  Alb  4.0  /  TBili  0.4  /  DBili  x   /  AST  25  /  ALT  26  /  AlkPhos  65  09-22            ECG: Sinus tachycardia, LAD, nonspecific lateral ST abnormality     History of Present Illness: The patient is a 72 year old female with a history of HTN, HL, hypothyroidism who presents with shortness of breath. The patient had noted two or three episodes over the past couple of days of acute shortness of breath. She woke up this morning with another episode. She notes some left shoulder and arm discomfort/numbness. No fevers, leg swelling.    Past Medical/Surgical History:  HTN, HL, hypothyroidism    Medications:  Home Medications:  Aldactone 25 mg oral tablet: 1 tab(s) orally once a day (22 Sep 2022 07:36)  aspirin 81 mg oral tablet, chewable: 1 tab(s) orally once a day (22 Sep 2022 07:36)  Crestor 10 mg oral tablet: 1 tab(s) orally once a day (22 Sep 2022 07:36)  predniSONE 5 mg oral tablet: 1 tab(s) orally once a day (22 Sep 2022 07:36)  Prevacid 30 mg oral delayed release capsule: 1 cap(s) orally once a day (22 Sep 2022 07:36)  ramipril 2.5 mg oral capsule: 1 cap(s) orally once a day (22 Sep 2022 07:36)  Synthroid 50 mcg (0.05 mg) oral tablet: 1 tab(s) orally once a day (22 Sep 2022 07:36)  Verelan 120 mg/24 hours oral capsule, extended release: 1 cap(s) orally once a day (22 Sep 2022 07:36)  vitamin d 1000: orally once a day (22 Sep 2022 07:36)      Family History: Non-contributory family history of premature cardiovascular atherosclerotic disease    Social History: +current tobacco use    Review of Systems:  General: No fevers, chills, weight gain  Skin: No rashes, color changes  Cardiovascular: No chest pain, orthopnea  Respiratory: +shortness of breath, cough  Gastrointestinal: No nausea, abdominal pain  Genitourinary: No incontinence, pain with urination  Musculoskeletal: No pain, swelling, decreased range of motion  Neurological: No headache, weakness  Psychiatric: No depression, anxiety  Endocrine: No weight gain, increased thirst  All other systems are comprehensively negative.    Physical Exam:  Vitals:        Vital Signs Last 24 Hrs  T(C): 36.6 (22 Sep 2022 07:57), Max: 36.6 (22 Sep 2022 07:57)  T(F): 97.8 (22 Sep 2022 07:57), Max: 97.8 (22 Sep 2022 07:57)  HR: 80 (22 Sep 2022 07:57) (80 - 115)  BP: 152/71 (22 Sep 2022 07:57) (152/71 - 209/96)  BP(mean): --  RR: 25 (22 Sep 2022 07:57) (25 - 45)  SpO2: 100% (22 Sep 2022 07:57) (96% - 100%)  Parameters below as of 22 Sep 2022 07:57  Patient On (Oxygen Delivery Method): BiPAP/CPAP  General: NAD  HEENT: MMM  Neck: No JVD, no carotid bruit  Lungs: Trace wheezing  CV: RRR, nl S1/S2, no M/R/G  Abdomen: S/NT/ND, +BS  Extremities: No LE edema, no cyanosis  Neuro: AAOx3, non-focal  Skin: No rash    Labs:                        12.3   9.89  )-----------( 256      ( 22 Sep 2022 06:55 )             37.8     09-22    134<L>  |  101  |  61<H>  ----------------------------<  137<H>  5.7<H>   |  24  |  2.80<H>    Ca    9.3      22 Sep 2022 06:55  Mg     2.2     09-22    TPro  8.9<H>  /  Alb  4.0  /  TBili  0.4  /  DBili  x   /  AST  25  /  ALT  26  /  AlkPhos  65  09-22            ECG: Sinus tachycardia, LAD, nonspecific lateral ST abnormality

## 2022-09-22 NOTE — CONSULT NOTE ADULT - ASSESSMENT
72 y.o. F presents by EMS c/o not feeling well, initial call to EMS was for difficulty breathing, at home family mentioned possible facial droop, pt in significant respiratory distress on presentation to ED    resp distress  CHF  pleural eff  pulm edema  COPD  OP  OA  Ex Smoker  hx of Pulm Nodules    diuresis - cvs rx regimen optimization - cardio eval   I and O  serial labs  replete lytes  monitor VS and HD  card tele and pulse ox monitoring  VBG  NIPPV - prn use - o2 support - wean fio2 as tolerated - keep sat > 88 pct  COPD - Bronchodilators and short course of Systemic Steroids  CT imaging reviewed - esoph dilation - effusions - atelectasis  effusions - no indication for thoracentesis at present - med rx regimen - card rx   I anastasia as able to cooperate  asp prec  HOB elev  Serial Renal Indices -   GOC discussion

## 2022-09-22 NOTE — H&P ADULT - NSHPPHYSICALEXAM_GEN_ALL_CORE
PHYSICAL EXAM:  Vital Signs Last 24 Hrs  T(C): 36.9 (22 Sep 2022 16:05), Max: 36.9 (22 Sep 2022 16:05)  T(F): 98.5 (22 Sep 2022 16:05), Max: 98.5 (22 Sep 2022 16:05)  HR: 85 (22 Sep 2022 16:00) (71 - 115)  BP: 154/64 (22 Sep 2022 16:00) (135/67 - 209/96)  BP(mean): 81 (22 Sep 2022 16:00) (81 - 108)  RR: 22 (22 Sep 2022 16:00) (17 - 45)  SpO2: 100% (22 Sep 2022 16:00) (96% - 100%)    Parameters below as of 22 Sep 2022 16:00  Patient On (Oxygen Delivery Method): nasal cannula  O2 Flow (L/min): 2      GENERAL: NAD, well-groomed, well-developed  HEAD:  Atraumatic, Normocephalic  EYES: EOMI, PERRLA, conjunctiva and sclera clear  ENMT: No tonsillar erythema, exudates, or enlargement; Moist mucous membranes, Good dentition, No lesions  NECK: Supple, No JVD, Normal thyroid  NERVOUS SYSTEM:  Alert & Oriented X3, Good concentration; Motor Strength 5/5 B/L upper and lower extremities; CHEST/LUNG: Clear to auscultation bilaterally; No rales, rhonchi, wheezing, or rubs  HEART: Regular rate and rhythm; No murmurs, rubs, or gallops  ABDOMEN: Soft, Nontender, Nondistended; Bowel sounds present  EXTREMITIES:  2+ Peripheral Pulses, No clubbing, cyanosis, or edema  LYMPH: No lymphadenopathy noted  SKIN: No rashes or lesions

## 2022-09-22 NOTE — ED ADULT NURSE NOTE - NSIMPLEMENTINTERV_GEN_ALL_ED
Implemented All Fall Risk Interventions:  [place] to call system. Call bell, personal items and telephone within reach. Instruct patient to call for assistance. Room bathroom lighting operational. Non-slip footwear when patient is off stretcher. Physically safe environment: no spills, clutter or unnecessary equipment. Stretcher in lowest position, wheels locked, appropriate side rails in place. Provide visual cue, wrist band, yellow gown, etc. Monitor gait and stability. Monitor for mental status changes and reorient to person, place, and time. Review medications for side effects contributing to fall risk. Reinforce activity limits and safety measures with patient and family.

## 2022-09-22 NOTE — CONSULT NOTE ADULT - SUBJECTIVE AND OBJECTIVE BOX
Date/Time Patient Seen:  		  Referring MD:   Data Reviewed	       Patient is a 72y old  Female who presents with a chief complaint of     Subjective/HPI    in bed  seen and examined  vs noted  labs reviewed  ER provider note reviewed  ct chest reviewed  on NIPPV in ED  spoke with Dtr  follows with med and cardio team in [facility]    ex smoker  non drinker  lives at home    · Chief Complaint: The patient is a 72y Female complaining of difficulty breathing.  · Unable to Obtain: Severe Illness/Injury  · HPI Objective Statement: 72 y.o. F presents by EMS c/o not feeling well, initial call to EMS was for difficulty breathing, at home family mentioned possible facial droop, pt in significant respiratory distress at this time, unable to give more history than to state she isn't feeling well and calling for her mother       PAST MEDICAL & SURGICAL HISTORY:  COPD exacerbation          Medication list         MEDICATIONS  (STANDING):    MEDICATIONS  (PRN):  nitroglycerin     SubLingual 0.4 milliGRAM(s) SubLingual every 5 minutes PRN Chest Pain         Vitals log        ICU Vital Signs Last 24 Hrs  T(C): 36.6 (22 Sep 2022 07:57), Max: 36.6 (22 Sep 2022 07:57)  T(F): 97.8 (22 Sep 2022 07:57), Max: 97.8 (22 Sep 2022 07:57)  HR: 76 (22 Sep 2022 09:01) (76 - 115)  BP: 170/80 (22 Sep 2022 09:01) (152/71 - 209/96)  BP(mean): --  ABP: --  ABP(mean): --  RR: 22 (22 Sep 2022 09:01) (22 - 45)  SpO2: 100% (22 Sep 2022 09:01) (96% - 100%)    O2 Parameters below as of 22 Sep 2022 09:01  Patient On (Oxygen Delivery Method): BiPAP/CPAP         International Travel within 21 days? Unable to Assess.(1)     Domestic Travel:  Any travel outside of [facility] within the last 14 days? Unable to Assess.(1)     Preferred Language to Address Healthcare:  · Preferred Language to Address Healthcare	English     Patient Identity:  · Birth Sex	Female     Child Abuse Assessment (patients less than 13 yrs):  Chief Complaint: difficulty breathing.    · Chief Complaint: The patient is a 72y Female complaining of difficulty breathing.  · Unable to Obtain: Severe Illness/Injury  · HPI Objective Statement: 72 y.o. F presents by EMS c/o not feeling well, initial call to EMS was for difficulty breathing, at home family mentioned possible facial droop, pt in significant respiratory distress at this time, unable to give more history than to state she isn't feeling well and calling for her mother    HIV:    HIV Test Questions:  · In accordance with [place] State law, we offer every patient who comes to our ED an HIV test. Would you like to be tested today?	Opt out    PAST MEDICAL/SURGICAL/FAMILY/SOCIAL HISTORY:    Past Medical, Past Surgical, and Family History:  PAST MEDICAL HISTORY:  COPD exacerbation.     Tobacco Usage:  · Tobacco Usage	Unknown if ever smoked    ALLERGIES AND HOME MEDICATIONS:   Allergies:        Allergies:  	No Known Allergies:     Home Medications:   * Outpatient Medication Status not yet specified    REVIEW OF SYSTEMS:    Review of Systems:  · UNABLE TO OBTAIN: Severe Illness/Injury          Input and Output:  I&O's Detail    22 Sep 2022 07:01  -  22 Sep 2022 09:45  --------------------------------------------------------  IN:  Total IN: 0 mL    OUT:    Voided (mL): 460 mL  Total OUT: 460 mL    Total NET: -460 mL          Lab Data                        12.3   9.89  )-----------( 256      ( 22 Sep 2022 06:55 )             37.8     09-22    134<L>  |  101  |  61<H>  ----------------------------<  137<H>  5.7<H>   |  24  |  2.80<H>    Ca    9.3      22 Sep 2022 06:55  Mg     2.2     09-22    TPro  8.9<H>  /  Alb  4.0  /  TBili  0.4  /  DBili  x   /  AST  25  /  ALT  26  /  AlkPhos  65  09-22            Review of Systems	  resp distress      Objective     Physical Examination  heart s1s2  lung dec BS  abd soft  head nc  verbal  alert  cn grossly int  on NIPPV        Pertinent Lab findings & Imaging      Tish:  NO   Adequate UO     I&O's Detail    22 Sep 2022 07:01  -  22 Sep 2022 09:45  --------------------------------------------------------  IN:  Total IN: 0 mL    OUT:    Voided (mL): 460 mL  Total OUT: 460 mL    Total NET: -460 mL               Discussed with:     Cultures:	        Radiology      ACC: [id number] EXAM:  CT ABDOMEN AND PELVIS                        ACC: [id number] EXAM:  CT CHEST                          PROCEDURE DATE:  09/22/2022          INTERPRETATION:  CLINICAL INFORMATION: Shortness of breath, acute kidney   injury    COMPARISON: Chest CT 10/16/2019, CT abdomen and pelvis 4/30/2018    CONTRAST/COMPLICATIONS:  IV Contrast: NONE  Oral Contrast: NONE  Complications: None reported at time of study completion    PROCEDURE:  CT of the Chest, Abdomen and Pelvis was performed.  Sagittal and coronal reformats were performed.    FINDINGS:  CHEST:  LUNGS AND LARGE AIRWAYS: The central airways are patent. There is   interstitial and mild alveolar pulmonary edema. Previously seen left   upper lobe nodular opacity is not seen and may be obscured by pulmonary   edema.  PLEURA: Trace bilateral pleural effusions.  VESSELS: Aortic atherosclerosis without aneurysm.  HEART: No cardiomegaly. No pericardial effusion. Coronary, mitral   annular, and aortic valve calcification.  MEDIASTINUM AND JAMIE: No adenopathy.  CHEST WALL AND LOWER NECK: No masses.    ABDOMEN AND PELVIS:  LIVER: Normal.  BILE DUCTS: Nondilated.  GALLBLADDER: Gallstones.  SPLEEN: Normal.  PANCREAS: Normal.  ADRENALS: Normal.  KIDNEYS/URETERS: No hydronephrosis or urinary tract calculi. Bilateral   extrarenal pelvis is again noted.    BLADDER: Cystocele at rest.  REPRODUCTIVE ORGANS: Stable 3.4 cm left adnexal mass since 2018. There is   evidence of pelvic organ descent.    BOWEL: No bowel-related abnormality. Specifically, no evidence of acute   diverticulitis. Normal appendix and ileocecal region. No bowel   obstruction or bowel inflammation. Mildly dilated fluid-filled esophagus.  PERITONEUM: No free air or ascites.  VESSELS: Aortoiliac atherosclerosis without aneurysm.  RETROPERITONEUM/LYMPH NODES: No adenopathy.  ABDOMINAL WALL: Normal.  BONES: No aggressive lesion.    IMPRESSION:  *  Pulmonary edema and trace bilateral pleural effusions.  *  No hydronephrosis or urinary tract calculi. Bilateral extrarenal   pelvis again noted.  *  Mildly dilated fluid-filled esophagus. Aspiration precautions are   advised.  *  Stable 3.4 cm left adnexal mass since 2018 remains indeterminate for   neoplasm.  *  Mild cystocele at rest. Further evaluation for pelvic floor   insufficiency is recommended.      --- End of Report ---            JOSE DAVID NICHOLSON MD; Attending Radiologist  This document has been electronically signed. Sep 22 2022  9:34AM

## 2022-09-22 NOTE — ED PROVIDER NOTE - CLINICAL SUMMARY MEDICAL DECISION MAKING FREE TEXT BOX
72 y.o. F BIBEMS with sob overnight - appears in acute chf, with wheeze - cardiac and sepsis labs, cxr, ekg, bipap, ntg/lasix, pt took 3 baby asa at about 2am, pt will require admission

## 2022-09-22 NOTE — H&P ADULT - NSHPLABSRESULTS_GEN_ALL_CORE
Labs:                          12.3   9.89  )-----------( 256      ( 22 Sep 2022 06:55 )             37.8           134<L>  |  101  |  61<H>  ----------------------------<  137<H>  5.7<H>   |  24  |  2.80<H>    Ca    9.3      22 Sep 2022 06:55  Mg     2.2         TPro  8.9<H>  /  Alb  4.0  /  TBili  0.4  /  DBili  x   /  AST  25  /  ALT  26  /  AlkPhos  65                Urinalysis Basic - ( 22 Sep 2022 09:07 )    Color: Yellow / Appearance: Clear / S.005 / pH: x  Gluc: x / Ketone: Negative  / Bili: Negative / Urobili: Negative mg/dL   Blood: x / Protein: 15 mg/dL / Nitrite: Negative   Leuk Esterase: Negative / RBC: 0-2 /HPF / WBC 0-2   Sq Epi: x / Non Sq Epi: Negative / Bacteria: Negative            Lactate Trend   @ 10:52 Lactate:0.3    @ 06:55 Lactate:2.4             CAPILLARY BLOOD GLUCOSE      POCT Blood Glucose.: 113 mg/dL (22 Sep 2022 08:51)      EKG:   Personally Reviewed:  [ ] YES     Imaging:   Personally Reviewed:  [ ] YES

## 2022-09-23 LAB
ALBUMIN SERPL ELPH-MCNC: 3.8 G/DL — SIGNIFICANT CHANGE UP (ref 3.3–5)
ALP SERPL-CCNC: 49 U/L — SIGNIFICANT CHANGE UP (ref 30–120)
ALT FLD-CCNC: 24 U/L DA — SIGNIFICANT CHANGE UP (ref 10–60)
ANION GAP SERPL CALC-SCNC: 13 MMOL/L — SIGNIFICANT CHANGE UP (ref 5–17)
ANTI-RIBONUCLEAR PROTEIN: >8 AI — HIGH
AST SERPL-CCNC: 20 U/L — SIGNIFICANT CHANGE UP (ref 10–40)
BASOPHILS # BLD AUTO: 0 K/UL — SIGNIFICANT CHANGE UP (ref 0–0.2)
BASOPHILS NFR BLD AUTO: 0 % — SIGNIFICANT CHANGE UP (ref 0–2)
BILIRUB SERPL-MCNC: 0.5 MG/DL — SIGNIFICANT CHANGE UP (ref 0.2–1.2)
BUN SERPL-MCNC: 60 MG/DL — HIGH (ref 7–23)
CALCIUM SERPL-MCNC: 9.8 MG/DL — SIGNIFICANT CHANGE UP (ref 8.4–10.5)
CENTROMERE AB SER-ACNC: <0.2 AI — SIGNIFICANT CHANGE UP
CHLORIDE SERPL-SCNC: 99 MMOL/L — SIGNIFICANT CHANGE UP (ref 96–108)
CO2 SERPL-SCNC: 22 MMOL/L — SIGNIFICANT CHANGE UP (ref 22–31)
CREAT SERPL-MCNC: 2.71 MG/DL — HIGH (ref 0.5–1.3)
CRP SERPL-MCNC: <3 MG/L — SIGNIFICANT CHANGE UP
DSDNA AB SER-ACNC: 44 IU/ML — HIGH
EGFR: 18 ML/MIN/1.73M2 — LOW
ENA SCL70 AB SER-ACNC: <0.2 AI — SIGNIFICANT CHANGE UP
EOSINOPHIL # BLD AUTO: 0 K/UL — SIGNIFICANT CHANGE UP (ref 0–0.5)
EOSINOPHIL NFR BLD AUTO: 0 % — SIGNIFICANT CHANGE UP (ref 0–6)
GLUCOSE SERPL-MCNC: 147 MG/DL — HIGH (ref 70–99)
HCT VFR BLD CALC: 35.1 % — SIGNIFICANT CHANGE UP (ref 34.5–45)
HCV AB S/CO SERPL IA: 0.1 S/CO — SIGNIFICANT CHANGE UP (ref 0–0.99)
HCV AB SERPL-IMP: SIGNIFICANT CHANGE UP
HGB BLD-MCNC: 11.4 G/DL — LOW (ref 11.5–15.5)
IMM GRANULOCYTES NFR BLD AUTO: 0.8 % — SIGNIFICANT CHANGE UP (ref 0–0.9)
LYMPHOCYTES # BLD AUTO: 0.55 K/UL — LOW (ref 1–3.3)
LYMPHOCYTES # BLD AUTO: 14.5 % — SIGNIFICANT CHANGE UP (ref 13–44)
MAGNESIUM SERPL-MCNC: 1.9 MG/DL — SIGNIFICANT CHANGE UP (ref 1.6–2.6)
MCHC RBC-ENTMCNC: 31.1 PG — SIGNIFICANT CHANGE UP (ref 27–34)
MCHC RBC-ENTMCNC: 32.5 GM/DL — SIGNIFICANT CHANGE UP (ref 32–36)
MCV RBC AUTO: 95.6 FL — SIGNIFICANT CHANGE UP (ref 80–100)
MONOCYTES # BLD AUTO: 0.25 K/UL — SIGNIFICANT CHANGE UP (ref 0–0.9)
MONOCYTES NFR BLD AUTO: 6.6 % — SIGNIFICANT CHANGE UP (ref 2–14)
NEUTROPHILS # BLD AUTO: 2.97 K/UL — SIGNIFICANT CHANGE UP (ref 1.8–7.4)
NEUTROPHILS NFR BLD AUTO: 78.1 % — HIGH (ref 43–77)
NRBC # BLD: 0 /100 WBCS — SIGNIFICANT CHANGE UP (ref 0–0)
PHOSPHATE SERPL-MCNC: 6.2 MG/DL — HIGH (ref 2.5–4.5)
PLATELET # BLD AUTO: 228 K/UL — SIGNIFICANT CHANGE UP (ref 150–400)
POTASSIUM SERPL-MCNC: 4.9 MMOL/L — SIGNIFICANT CHANGE UP (ref 3.5–5.3)
POTASSIUM SERPL-SCNC: 4.9 MMOL/L — SIGNIFICANT CHANGE UP (ref 3.5–5.3)
PROT SERPL-MCNC: 8.6 G/DL — HIGH (ref 6–8.3)
RBC # BLD: 3.67 M/UL — LOW (ref 3.8–5.2)
RBC # FLD: 14.6 % — HIGH (ref 10.3–14.5)
RHEUMATOID FACT SERPL-ACNC: <10 IU/ML — SIGNIFICANT CHANGE UP (ref 0–13)
SODIUM SERPL-SCNC: 134 MMOL/L — LOW (ref 135–145)
T3 SERPL-MCNC: 49 NG/DL — LOW (ref 80–200)
T4 AB SER-ACNC: 7.4 UG/DL — SIGNIFICANT CHANGE UP (ref 4.6–12)
TSH SERPL-MCNC: 4.76 UIU/ML — HIGH (ref 0.27–4.2)
WBC # BLD: 3.8 K/UL — SIGNIFICANT CHANGE UP (ref 3.8–10.5)
WBC # FLD AUTO: 3.8 K/UL — SIGNIFICANT CHANGE UP (ref 3.8–10.5)

## 2022-09-23 PROCEDURE — 99233 SBSQ HOSP IP/OBS HIGH 50: CPT

## 2022-09-23 RX ORDER — ACETAMINOPHEN 500 MG
325 TABLET ORAL ONCE
Refills: 0 | Status: COMPLETED | OUTPATIENT
Start: 2022-09-23 | End: 2022-09-23

## 2022-09-23 RX ORDER — LABETALOL HCL 100 MG
5 TABLET ORAL ONCE
Refills: 0 | Status: DISCONTINUED | OUTPATIENT
Start: 2022-09-23 | End: 2022-09-23

## 2022-09-23 RX ORDER — FAMOTIDINE 10 MG/ML
20 INJECTION INTRAVENOUS ONCE
Refills: 0 | Status: COMPLETED | OUTPATIENT
Start: 2022-09-23 | End: 2022-09-23

## 2022-09-23 RX ORDER — FUROSEMIDE 40 MG
40 TABLET ORAL EVERY 12 HOURS
Refills: 0 | Status: DISCONTINUED | OUTPATIENT
Start: 2022-09-23 | End: 2022-09-24

## 2022-09-23 RX ORDER — POLYETHYLENE GLYCOL 3350 17 G/17G
17 POWDER, FOR SOLUTION ORAL DAILY
Refills: 0 | Status: DISCONTINUED | OUTPATIENT
Start: 2022-09-23 | End: 2022-09-29

## 2022-09-23 RX ORDER — DIPHENHYDRAMINE HCL 50 MG
50 CAPSULE ORAL ONCE
Refills: 0 | Status: DISCONTINUED | OUTPATIENT
Start: 2022-09-23 | End: 2022-09-23

## 2022-09-23 RX ORDER — LABETALOL HCL 100 MG
10 TABLET ORAL EVERY 6 HOURS
Refills: 0 | Status: DISCONTINUED | OUTPATIENT
Start: 2022-09-23 | End: 2022-09-29

## 2022-09-23 RX ORDER — CARVEDILOL PHOSPHATE 80 MG/1
6.25 CAPSULE, EXTENDED RELEASE ORAL EVERY 12 HOURS
Refills: 0 | Status: DISCONTINUED | OUTPATIENT
Start: 2022-09-23 | End: 2022-09-24

## 2022-09-23 RX ORDER — AMLODIPINE BESYLATE 2.5 MG/1
5 TABLET ORAL DAILY
Refills: 0 | Status: DISCONTINUED | OUTPATIENT
Start: 2022-09-23 | End: 2022-09-28

## 2022-09-23 RX ORDER — DIPHENHYDRAMINE HCL 50 MG
50 CAPSULE ORAL ONCE
Refills: 0 | Status: COMPLETED | OUTPATIENT
Start: 2022-09-23 | End: 2022-09-23

## 2022-09-23 RX ADMIN — Medication 650 MILLIGRAM(S): at 03:19

## 2022-09-23 RX ADMIN — HEPARIN SODIUM 5000 UNIT(S): 5000 INJECTION INTRAVENOUS; SUBCUTANEOUS at 21:44

## 2022-09-23 RX ADMIN — Medication 325 MILLIGRAM(S): at 07:22

## 2022-09-23 RX ADMIN — POLYETHYLENE GLYCOL 3350 17 GRAM(S): 17 POWDER, FOR SOLUTION ORAL at 11:59

## 2022-09-23 RX ADMIN — Medication 650 MILLIGRAM(S): at 04:15

## 2022-09-23 RX ADMIN — Medication 325 MILLIGRAM(S): at 07:25

## 2022-09-23 RX ADMIN — Medication 3 MILLILITER(S): at 15:05

## 2022-09-23 RX ADMIN — AMLODIPINE BESYLATE 5 MILLIGRAM(S): 2.5 TABLET ORAL at 10:23

## 2022-09-23 RX ADMIN — HEPARIN SODIUM 5000 UNIT(S): 5000 INJECTION INTRAVENOUS; SUBCUTANEOUS at 16:06

## 2022-09-23 RX ADMIN — CARVEDILOL PHOSPHATE 6.25 MILLIGRAM(S): 80 CAPSULE, EXTENDED RELEASE ORAL at 09:48

## 2022-09-23 RX ADMIN — Medication 1 PATCH: at 21:44

## 2022-09-23 RX ADMIN — Medication 75 MICROGRAM(S): at 06:01

## 2022-09-23 RX ADMIN — Medication 1 PATCH: at 19:42

## 2022-09-23 RX ADMIN — Medication 40 MILLIGRAM(S): at 06:01

## 2022-09-23 RX ADMIN — Medication 1 PATCH: at 21:39

## 2022-09-23 RX ADMIN — Medication 1 PATCH: at 07:38

## 2022-09-23 RX ADMIN — Medication 650 MILLIGRAM(S): at 23:00

## 2022-09-23 RX ADMIN — Medication 40 MILLIGRAM(S): at 17:22

## 2022-09-23 RX ADMIN — FAMOTIDINE 20 MILLIGRAM(S): 10 INJECTION INTRAVENOUS at 06:23

## 2022-09-23 RX ADMIN — Medication 3 MILLILITER(S): at 06:43

## 2022-09-23 RX ADMIN — Medication 10 MILLIGRAM(S): at 04:24

## 2022-09-23 RX ADMIN — CARVEDILOL PHOSPHATE 6.25 MILLIGRAM(S): 80 CAPSULE, EXTENDED RELEASE ORAL at 17:22

## 2022-09-23 RX ADMIN — Medication 650 MILLIGRAM(S): at 22:16

## 2022-09-23 RX ADMIN — HEPARIN SODIUM 5000 UNIT(S): 5000 INJECTION INTRAVENOUS; SUBCUTANEOUS at 06:01

## 2022-09-23 RX ADMIN — Medication 60 MILLIGRAM(S): at 06:01

## 2022-09-23 RX ADMIN — Medication 50 MILLIGRAM(S): at 06:23

## 2022-09-23 RX ADMIN — Medication 3 MILLILITER(S): at 22:31

## 2022-09-23 NOTE — DIETITIAN INITIAL EVALUATION ADULT - PERTINENT LABORATORY DATA
09-23    134<L>  |  99  |  60<H>  ----------------------------<  147<H>  4.9   |  22  |  2.71<H>    Ca    9.8      23 Sep 2022 06:31  Phos  6.2     09-23  Mg     1.9     09-23    TPro  8.6<H>  /  Alb  3.8  /  TBili  0.5  /  DBili  x   /  AST  20  /  ALT  24  /  AlkPhos  49  09-23

## 2022-09-23 NOTE — PROGRESS NOTE ADULT - ASSESSMENT
72 y.o. F presents by EMS c/o not feeling well, initial call to EMS was for difficulty breathing, at home family mentioned possible facial droop, pt in significant respiratory distress on presentation to ED    resp distress  CHF  pleural eff  pulm edema  COPD  OP  OA  Ex Smoker  hx of Pulm Nodules    RENAL Cx noted - Renal US noted - grossly normal  ECHO - valv heart disease - EF 45 - HFpEF - Pulm HTN  Cardio follow up - diuresis as per card recs  Solumedrol - short course - 4 - 5 days total    diuresis - cvs rx regimen optimization - cardio eval   I and O  serial labs  replete lytes  monitor VS and HD  card tele and pulse ox monitoring  VBG  NIPPV - prn use - o2 support - wean fio2 as tolerated - keep sat > 88 pct  COPD - Bronchodilators and short course of Systemic Steroids  CT imaging reviewed - esoph dilation - effusions - atelectasis  effusions - no indication for thoracentesis at present - med rx regimen - card rx   I anastasia as able to cooperate  asp prec  HOB elev  Serial Renal Indices -   GOC discussion

## 2022-09-23 NOTE — DIETITIAN INITIAL EVALUATION ADULT - OTHER INFO
Per H&P, pt is a "72F CAD, Lupus, RA, Hypothyroidism, HTN, CKD3, and recent COVID in June 2022 comes in today complaining of SOB on exertion.   Patient was in usual state of health up until few days prior when she started to notice progressively worsening SOB.  Denies any chest pain, or orthopnea. No recent illness or sick contacts. No changes in medications.  CT Chest shows pulmonary edema and lasix given. Patient admitted for further management."    Pt seen for nutrition assessment secondary to SPCU admission.  Spouse at bedside during time of visit.  Pt currently on regular diet with reported decreased appetite and intake.  Reports some nausea and heaving, which have improved somewhat.  States she continues to not have an appetite.  Reports she primarily does food preparation at home - was eating well up until she was diagnosed with COVID in June 2022.  Since then she reports significant decrease in appetite and intake, reporting occasionally not eating anything.  Usually breakfast is toast, coffee, lunch is eggs, meat, dinner is similar with addition of vegetables and boiled potatoes.  Pt also endorses vomting episodes since ~June/July, ~4-5x/wk- usually at 1am.  Also endorses hx constipation (usual bm every 3 days).  Pt reports assocated weight loss secondary to poor po intake x few months.  Per pt/spouse, weighed 155# in June and CBW ~140# (adm wt 138#), indicating 17#/10.9% x ~3 months (clinically signficant).  Based on </75% of estimated energy requirement for >/1 month and 10.9% wt loss x 3 months, pt meets clincal criteria of severe protein calorie malnutrition in context of chronic illness.  Renal following for acute renal failure on CKD, hyperkalemic on adm (now on WNL, also on lasix), hyperphosphatemia.  PO intake remains very poor - requests toast, bland foods; regular diet remains appropriate to optimize po intake and tolerance.  Discussed nutrition supplement options, pt denies interest at receiving in house at present time.  Recommend consider GI consult.  RD to follow closely and will continue to monitor pt's nutrition status.   Per H&P, pt is a "72F CAD, Lupus, RA, Hypothyroidism, HTN, CKD3, and recent COVID in June 2022 comes in today complaining of SOB on exertion.   Patient was in usual state of health up until few days prior when she started to notice progressively worsening SOB.  Denies any chest pain, or orthopnea. No recent illness or sick contacts. No changes in medications.  CT Chest shows pulmonary edema and lasix given. Patient admitted for further management."    Pt seen for nutrition assessment secondary to SPCU admission.  Spouse at bedside during time of visit.  Pt currently on regular diet with reported decreased appetite and intake.  Reports some nausea and heaving, which have improved somewhat.  States she continues to not have an appetite.  Reports she primarily does food preparation at home - was eating well up until she was diagnosed with COVID in June 2022.  Since then she reports significant decrease in appetite and intake, reporting occasionally not eating anything.  Usually breakfast is toast, coffee, lunch is eggs, meat, dinner is similar with addition of vegetables and boiled potatoes.  Portions are considerably smaller compared to baseline.  Pt also endorses vomiting episodes since ~June/July, ~4-5x/wk- usually at 1am.  Also endorses hx constipation (usual bm every 3 days).  Pt reports assocated weight loss secondary to poor po intake x few months.  Per pt/spouse, weighed 155# in June and CBW ~140# (adm wt 138#), indicating 17#/10.9% x ~3 months (clinically significant).  Based on </75% of estimated energy requirement for >/1 month and 10.9% wt loss x 3 months, pt meets clincal criteria of severe protein calorie malnutrition in context of chronic illness.  Renal following for acute renal failure on CKD, hyperkalemic on adm (now on WNL, also on lasix), hyperphosphatemia.  PO intake remains very poor - requests toast, bland foods; regular diet remains appropriate to optimize po intake and tolerance.  Discussed nutrition supplement options, pt denies interest in receiving in house at present time.  PO intake of meals/fluids encouraged, reinforced importance of protein intake to help preserve LBM, deter additional weight loss.  Recommend consider GI consult.  RD to follow closely and will continue to monitor pt's nutrition status.

## 2022-09-23 NOTE — PROGRESS NOTE ADULT - SUBJECTIVE AND OBJECTIVE BOX
Date/Time Patient Seen:  		  Referring MD:   Data Reviewed	       Patient is a 72y old  Female who presents with a chief complaint of SOB (22 Sep 2022 20:44)      Subjective/HPI     PAST MEDICAL & SURGICAL HISTORY:  COPD exacerbation          Medication list         MEDICATIONS  (STANDING):  acetaminophen     Tablet .. 325 milliGRAM(s) Oral once  albuterol/ipratropium for Nebulization 3 milliLiter(s) Nebulizer every 8 hours  furosemide   Injectable 40 milliGRAM(s) IV Push daily  heparin   Injectable 5000 Unit(s) SubCutaneous every 8 hours  labetalol Injectable 5 milliGRAM(s) IV Push once  levothyroxine 75 MICROGram(s) Oral daily  methylPREDNISolone sodium succinate Injectable 60 milliGRAM(s) IV Push daily  nicotine -  14 mG/24Hr(s) Patch 1 Patch Transdermal daily    MEDICATIONS  (PRN):  acetaminophen     Tablet .. 650 milliGRAM(s) Oral every 6 hours PRN Temp greater or equal to 38C (100.4F), Mild Pain (1 - 3)  albuterol/ipratropium for Nebulization 3 milliLiter(s) Nebulizer every 3 hours PRN Shortness of Breath and/or Wheezing  nitroglycerin     SubLingual 0.4 milliGRAM(s) SubLingual every 5 minutes PRN Chest Pain         Vitals log        ICU Vital Signs Last 24 Hrs  T(C): 36.8 (23 Sep 2022 04:15), Max: 36.9 (22 Sep 2022 16:05)  T(F): 98.2 (23 Sep 2022 04:15), Max: 98.5 (22 Sep 2022 16:05)  HR: 85 (23 Sep 2022 06:45) (71 - 105)  BP: 180/80 (23 Sep 2022 04:00) (135/67 - 182/76)  BP(mean): 107 (23 Sep 2022 04:00) (81 - 109)  ABP: --  ABP(mean): --  RR: 19 (23 Sep 2022 04:00) (17 - 25)  SpO2: 100% (23 Sep 2022 06:45) (96% - 100%)    O2 Parameters below as of 23 Sep 2022 06:45  Patient On (Oxygen Delivery Method): nasal cannula,2lpm                 Input and Output:  I&O's Detail    22 Sep 2022 07:01  -  23 Sep 2022 06:50  --------------------------------------------------------  IN:  Total IN: 0 mL    OUT:    Voided (mL): 2660 mL  Total OUT: 2660 mL    Total NET: -2660 mL          Lab Data                        12.3   9.89  )-----------( 256      ( 22 Sep 2022 06:55 )             37.8     09-22    134<L>  |  101  |  61<H>  ----------------------------<  137<H>  5.7<H>   |  24  |  2.80<H>    Ca    9.3      22 Sep 2022 06:55  Mg     2.2     09-22    TPro  8.9<H>  /  Alb  4.0  /  TBili  0.4  /  DBili  x   /  AST  25  /  ALT  26  /  AlkPhos  65  09-22            Review of Systems	      Objective     Physical Examination  heart s1s2  lung dc BS  abd soft        Pertinent Lab findings & Imaging      Tish:  NO   Adequate UO     I&O's Detail    22 Sep 2022 07:01  -  23 Sep 2022 06:50  --------------------------------------------------------  IN:  Total IN: 0 mL    OUT:    Voided (mL): 2660 mL  Total OUT: 2660 mL    Total NET: -2660 mL               Discussed with:     Cultures:	        Radiology

## 2022-09-23 NOTE — PROGRESS NOTE ADULT - SUBJECTIVE AND OBJECTIVE BOX
RHEUMATOLOGY CONSULT- [facility] VIRA LANGFORD MD, FACR [phone]      Patient is a 72y old  Female who presents with a chief complaint of SOB (23 Sep 2022 10:40)    HPI:  72F CAD, Lupus, RA, Hypothyroidism, HTN, CKD3, and recent COVID in 2022 comes in today complaining of SOB on exertion.   Patient was in usual state of health up until few days prior when she started to notice progressively worsening SOB.  Denies any chest pain, or orthopnea. No recent illness or sick contacts. No changes in medications.     Placed on BIPAP initially as she was hypoxic and later de-escalated to Nasal Canula and comfortable.  Able to talk and give me history.  Routine labs done showing elevated BNP and Creatinine. CT Chest shows pulmonary edema and lasix given. Patient admitted for further management.  (22 Sep 2022 12:17)    Additional Information: pt has hx MCTD, with raynauds and digital cold sensitivity. Had been treated in past with HCQ-became intolerant, low dose prednisone RX.  states that she had covid in , since then with episodes of post prandial drowsiness.   currently, less SOB after diuresis with lasix.    Labs: CRP,3, ESR 41, Anti RNP .8.0 +, RF 10, VICENTE pending     REVIEW OF SYSTEMS: per admitting note  All Review of systems negative, except for those marked:    MEDICATIONS  (STANDING):  albuterol/ipratropium for Nebulization 3 milliLiter(s) Nebulizer every 8 hours  amLODIPine   Tablet 5 milliGRAM(s) Oral daily  carvedilol 6.25 milliGRAM(s) Oral every 12 hours  furosemide   Injectable 40 milliGRAM(s) IV Push every 12 hours  heparin   Injectable 5000 Unit(s) SubCutaneous every 8 hours  levothyroxine 75 MICROGram(s) Oral daily  methylPREDNISolone sodium succinate Injectable 60 milliGRAM(s) IV Push daily  nicotine -  14 mG/24Hr(s) Patch 1 Patch Transdermal daily  polyethylene glycol 3350 17 Gram(s) Oral daily    MEDICATIONS  (PRN):  acetaminophen     Tablet .. 650 milliGRAM(s) Oral every 6 hours PRN Temp greater or equal to 38C (100.4F), Mild Pain (1 - 3)  albuterol/ipratropium for Nebulization 3 milliLiter(s) Nebulizer every 3 hours PRN Shortness of Breath and/or Wheezing  labetalol Injectable 10 milliGRAM(s) IV Push every 6 hours PRN Systolic blood pressure > 180  nitroglycerin     SubLingual 0.4 milliGRAM(s) SubLingual every 5 minutes PRN Chest Pain    Allergies    hydrALAZINE (Angioedema; Rash)    Intolerances      PPD:  Vaccines:    PAST MEDICAL & SURGICAL HISTORY:  COPD exacerbation          FAMILY HISTORY:  :  [+] Lupus/Collagen Vascular: daughter    SOCIAL HISTORY:    Vital Signs Last 24 Hrs  T(C): 36.9 (23 Sep 2022 12:00), Max: 36.9 (22 Sep 2022 16:05)  T(F): 98.4 (23 Sep 2022 12:00), Max: 98.5 (22 Sep 2022 16:05)  HR: 83 (23 Sep 2022 13:00) (73 - 101)  BP: 164/70 (23 Sep 2022 13:00) (153/95 - 191/86)  BP(mean): 96 (23 Sep 2022 13:00) (81 - 115)  RR: 17 (23 Sep 2022 13:00) (17 - 41)  SpO2: 100% (23 Sep 2022 13:00) (98% - 100%)    Parameters below as of 23 Sep 2022 06:45  Patient On (Oxygen Delivery Method): nasal cannula,2lpm      Daily     Daily Weight in k.6 (23 Sep 2022 05:52)    PHYSICAL EXAM:  All physical exam findings normal, except for those marked:  General Appearance:  Skin 		evidence of skin tightening at fingers, reduced nail bed   .		capillaries  .		  Eyes		WNL: normal conjunctiva and lids, normal pupils and iris  .		ENT		WNL: normal appearance of ears, nose lips, teeth, gums, oropharynx, oral   .		mucosal and palate  .		  Neck: 		WNL: no masses, normal thyroid  .		  Cardiovascular:  tachycardic, Systolic murmur noted,   .		  Respiratory: 	reduced respiratory effort, few crackles at rt base  .		  GI:		WNL: no masses or tenderness, normal liver and spleen  .		  Lymphatic: 	WNL: normal cervical, axillary and inguinal nodes  .		  Neurologic: 	alert, oriented, able to give hx  .		  Musculoskeletal:	 minimal joint tenderness, hands with sclerodactyly-,                           no evid of digital ulcerations		  Lab Results:                        11.4   3.80  )-----------( 228      ( 23 Sep 2022 06:31 )             35.1     09-23    134<L>  |  99  |  60<H>  ----------------------------<  147<H>  4.9   |  22  |  2.71<H>    Ca    9.8      23 Sep 2022 06:31  Phos  6.2       Mg     1.9         TPro  8.6<H>  /  Alb  3.8  /  TBili  0.5  /  DBili  x   /  AST  20  /  ALT  24  /  AlkPhos  49        Urinalysis Basic - ( 22 Sep 2022 09:07 )    Color: Yellow / Appearance: Clear / S.005 / pH: x  Gluc: x / Ketone: Negative  / Bili: Negative / Urobili: Negative mg/dL   Blood: x / Protein: 15 mg/dL / Nitrite: Negative   Leuk Esterase: Negative / RBC: 0-2 /HPF / WBC 0-2   Sq Epi: x / Non Sq Epi: Negative / Bacteria: Negative

## 2022-09-23 NOTE — DIETITIAN INITIAL EVALUATION ADULT - PERTINENT MEDS FT
MEDICATIONS  (STANDING):  albuterol/ipratropium for Nebulization 3 milliLiter(s) Nebulizer every 8 hours  amLODIPine   Tablet 5 milliGRAM(s) Oral daily  carvedilol 6.25 milliGRAM(s) Oral every 12 hours  furosemide   Injectable 40 milliGRAM(s) IV Push every 12 hours  heparin   Injectable 5000 Unit(s) SubCutaneous every 8 hours  levothyroxine 75 MICROGram(s) Oral daily  methylPREDNISolone sodium succinate Injectable 60 milliGRAM(s) IV Push daily  nicotine -  14 mG/24Hr(s) Patch 1 Patch Transdermal daily  polyethylene glycol 3350 17 Gram(s) Oral daily    MEDICATIONS  (PRN):  acetaminophen     Tablet .. 650 milliGRAM(s) Oral every 6 hours PRN Temp greater or equal to 38C (100.4F), Mild Pain (1 - 3)  albuterol/ipratropium for Nebulization 3 milliLiter(s) Nebulizer every 3 hours PRN Shortness of Breath and/or Wheezing  labetalol Injectable 10 milliGRAM(s) IV Push every 6 hours PRN Systolic blood pressure > 180  nitroglycerin     SubLingual 0.4 milliGRAM(s) SubLingual every 5 minutes PRN Chest Pain

## 2022-09-23 NOTE — PROGRESS NOTE ADULT - ASSESSMENT
The patient is a 72 year old female with a history of HTN, HL, hypothyroidism who presents with shortness of breath.    Plan:  - ECG with nonspecific findings but no evidence of ischemia or infarction  - Troponin mildly elevated at 192 in the setting of demand ischemia from heart failure, NED and respiratory failure. No need to trend further.  - CT chest with mild pulm edema and trace pleural effusions  - Echo with mildly reduced LV systolic function, mod MR, mod AR  - Increase furosemide to 40 mg IV q12h  - Hold spironolactone and ramipril  - Continue aspirin 81 mg daily  - Continue atorvastatin 20 mg daily (formulary equivalent)  - Add carvedilol 6.25 mg bid

## 2022-09-23 NOTE — PROGRESS NOTE ADULT - ASSESSMENT
72F CAD, Lupus, Hypothyroidism, HTN, CKD3, and recent COVID in June 2022 admitted for Acute Hypoxic Respiratory Failure.     Acute Hypoxic Respiratory Failure  CT Chest reviewed. Has Pulmonary Edema and history of CAD; Echo with diastolic Dysfunction and EF 45-50%  Given history of MCTD will need to be concerned for ILD and PAH  Lasix IV Daily and monitor Urine output; Supplemental O2 PRN   Pulmonary and Cardiology Consultation noted     Acute Renal Failure on CKD 3   Baseline Creatinine around 1.0; Renal US Reviewed   Adequate Urine output  Could be Pre-Renal and also consider Scleroderma Renal Crisis  Monitor BMP and Electrolytes     MCTD   Confirmed with her Rheumatologist that she has features of Scleroderma and SLE   Follow up VICENTE, RNP, SCL 70, RF, ESR and Centromere   Will need to consider Scleroderma Renal Crisis and have reached out to Rheumatology for possible consult   Currently we are giving her non pulse dose steroids    CAD / HTN   Patient BP uncontrolled; History of PCI  Lasix and Hydralazine; Hold ACE I but may resume after discussion with Rheum and Nephro    Hypothyroidism  Follow up TSH and Free T4  Synthroid increased    Diet  Regular    DVT Prophylaxis  Heparin SC     Disposition   Discharge planning pending hospital course

## 2022-09-23 NOTE — PROGRESS NOTE ADULT - SUBJECTIVE AND OBJECTIVE BOX
Subjective: Patient seen and examined. Overnight reacted to Hydralazine given IV and so discontinued.     MEDICATIONS  (STANDING):  albuterol/ipratropium for Nebulization 3 milliLiter(s) Nebulizer every 8 hours  amLODIPine   Tablet 5 milliGRAM(s) Oral daily  carvedilol 6.25 milliGRAM(s) Oral every 12 hours  furosemide   Injectable 40 milliGRAM(s) IV Push every 12 hours  heparin   Injectable 5000 Unit(s) SubCutaneous every 8 hours  levothyroxine 75 MICROGram(s) Oral daily  methylPREDNISolone sodium succinate Injectable 60 milliGRAM(s) IV Push daily  nicotine -  14 mG/24Hr(s) Patch 1 Patch Transdermal daily    MEDICATIONS  (PRN):  acetaminophen     Tablet .. 650 milliGRAM(s) Oral every 6 hours PRN Temp greater or equal to 38C (100.4F), Mild Pain (1 - 3)  albuterol/ipratropium for Nebulization 3 milliLiter(s) Nebulizer every 3 hours PRN Shortness of Breath and/or Wheezing  labetalol Injectable 10 milliGRAM(s) IV Push every 6 hours PRN Systolic blood pressure > 180  nitroglycerin     SubLingual 0.4 milliGRAM(s) SubLingual every 5 minutes PRN Chest Pain      Allergies    hydrALAZINE (Angioedema; Rash)    Intolerances        Vital Signs Last 24 Hrs  T(C): 36.8 (23 Sep 2022 04:15), Max: 36.9 (22 Sep 2022 16:05)  T(F): 98.2 (23 Sep 2022 04:15), Max: 98.5 (22 Sep 2022 16:05)  HR: 95 (23 Sep 2022 09:00) (71 - 101)  BP: 191/86 (23 Sep 2022 09:00) (135/67 - 191/86)  BP(mean): 114 (23 Sep 2022 09:00) (81 - 114)  RR: 21 (23 Sep 2022 09:00) (17 - 24)  SpO2: 100% (23 Sep 2022 09:00) (98% - 100%)    Parameters below as of 23 Sep 2022 06:45  Patient On (Oxygen Delivery Method): nasal cannula,2lpm        PHYSICAL EXAM:  GENERAL: NAD, well-groomed, well-developed  HEAD:  Atraumatic, Normocephalic  ENMT: Moist mucous membranes,   NECK: Supple, No JVD, Normal thyroid  NERVOUS SYSTEM:  All 4 extremities mobile, no gross sensory deficits.   CHEST/LUNG: Clear to auscultation bilaterally; No rales, rhonchi, wheezing, or rubs  HEART: Regular rate and rhythm; No murmurs, rubs, or gallops  ABDOMEN: Soft, Nontender, Nondistended; Bowel sounds present  EXTREMITIES:  2+ Peripheral Pulses, No clubbing, cyanosis, or edema      LABS:                        11.4   3.80  )-----------( 228      ( 23 Sep 2022 06:31 )             35.1     23 Sep 2022 06:31    134    |  99     |  60     ----------------------------<  147    4.9     |  22     |  2.71     Ca    9.8        23 Sep 2022 06:31  Phos  6.2       23 Sep 2022 06:31  Mg     1.9       23 Sep 2022 06:31    TPro  8.6    /  Alb  3.8    /  TBili  0.5    /  DBili  x      /  AST  20     /  ALT  24     /  AlkPhos  49     23 Sep 2022 06:31      Urinalysis Basic - ( 22 Sep 2022 09:07 )    Color: Yellow / Appearance: Clear / S.005 / pH: x  Gluc: x / Ketone: Negative  / Bili: Negative / Urobili: Negative mg/dL   Blood: x / Protein: 15 mg/dL / Nitrite: Negative   Leuk Esterase: Negative / RBC: 0-2 /HPF / WBC 0-2   Sq Epi: x / Non Sq Epi: Negative / Bacteria: Negative      CAPILLARY BLOOD GLUCOSE          RADIOLOGY & ADDITIONAL TESTS:    Imaging Personally Reviewed:  [ ] YES     Consultant(s) Notes Reviewed:      Care Discussed with Consultants/Other Providers:    Advanced Directives: [ ] DNR  [ ] No feeding tube  [ ] MOLST in chart  [ ] MOLST completed today  [ ] Unknown

## 2022-09-23 NOTE — PROGRESS NOTE ADULT - ASSESSMENT
Impresssion: Mixed Connective tissue disease, Limited Scleroderma with Raynauds                     + Anti RNP , suspect ILD                     NED with hypertensive episode.    Agree with current management, would limit steroids as they may exacerbate renal disease/hypertension.  reduce to prednisone 10mg if possible.  Holding ACE and use calcium channel blockers for hypertensive control.    Out patient management would include MMF as immunosuppressive - 500mg twice/day to start, may limit her progression to ILD and fibrosis.  continue per renal for HTN management  Progression of disease may be related recent covid infection and vascular compromise.  discussed prognosis with , all questions answered.  Advised to be followed closely as out patient and by PCP dr Calos Shay.

## 2022-09-23 NOTE — PROGRESS NOTE ADULT - ASSESSMENT
NED: ? CRS, on ACEI (Baseline crea 1.1 June 2022)  Pulmonary edema  Hypertension    On IV lasix. Good urine out put. Add amlodpine for BP control. Monitor BP trend. Titrate BP meds as needed. Salt restriction.   Renal sonogram results noted. Avoid nephrotoxic meds as possible. Avoid ACEI, ARB, NSAIDs and IV contrast.   Will follow electrolytes and renal function trend. D/w pt's daughter over the phone. Cardiology follow up.

## 2022-09-23 NOTE — DIETITIAN INITIAL EVALUATION ADULT - NUTRITIONGOAL OUTCOME1
pt to tolerate regular diet with > 50% of most meals/fluids/snacks; resolution of GI complaints; renal indices to improve

## 2022-09-23 NOTE — PROGRESS NOTE ADULT - SUBJECTIVE AND OBJECTIVE BOX
Patient is a 72y old  Female who presents with a chief complaint of SOB (23 Sep 2022 06:50)    Patient seen in follow up for NED.        PAST MEDICAL HISTORY:  COPD exacerbation      MEDICATIONS  (STANDING):  albuterol/ipratropium for Nebulization 3 milliLiter(s) Nebulizer every 8 hours  carvedilol 6.25 milliGRAM(s) Oral every 12 hours  furosemide   Injectable 40 milliGRAM(s) IV Push every 12 hours  heparin   Injectable 5000 Unit(s) SubCutaneous every 8 hours  levothyroxine 75 MICROGram(s) Oral daily  methylPREDNISolone sodium succinate Injectable 60 milliGRAM(s) IV Push daily  nicotine -  14 mG/24Hr(s) Patch 1 Patch Transdermal daily    MEDICATIONS  (PRN):  acetaminophen     Tablet .. 650 milliGRAM(s) Oral every 6 hours PRN Temp greater or equal to 38C (100.4F), Mild Pain (1 - 3)  albuterol/ipratropium for Nebulization 3 milliLiter(s) Nebulizer every 3 hours PRN Shortness of Breath and/or Wheezing  labetalol Injectable 10 milliGRAM(s) IV Push every 6 hours PRN Systolic blood pressure > 180  nitroglycerin     SubLingual 0.4 milliGRAM(s) SubLingual every 5 minutes PRN Chest Pain    T(C): 36.8 (22 @ 04:15), Max: 36.9 (22 @ 16:05)  HR: 95 (22 @ 09:00) (71 - 115)  BP: 191/86 (22 @ 09:00) (135/67 - 209/96)  RR: 21 (22 @ 09:00) (17 - 45)  SpO2: 100% (22 @ 09:00) (96% - 100%)  Wt(kg): --  I&O's Detail    22 Sep 2022 07:01  -  23 Sep 2022 07:00  --------------------------------------------------------  IN:  Total IN: 0 mL    OUT:    Voided (mL): 2660 mL  Total OUT: 2660 mL    Total NET: -2660 mL          PHYSICAL EXAM:  General: No distress  Respiratory: b/l air entry  Cardiovascular: S1 S2  Gastrointestinal: soft  Extremities:  no edema                              11.4   3.80  )-----------( 228      ( 23 Sep 2022 06:31 )             35.1         134<L>  |  99  |  60<H>  ----------------------------<  147<H>  4.9   |  22  |  2.71<H>    Ca    9.8      23 Sep 2022 06:31  Phos  6.2       Mg     1.9         TPro  8.6<H>  /  Alb  3.8  /  TBili  0.5  /  DBili  x   /  AST  20  /  ALT  24  /  AlkPhos  49          LIVER FUNCTIONS - ( 23 Sep 2022 06:31 )  Alb: 3.8 g/dL / Pro: 8.6 g/dL / ALK PHOS: 49 U/L / ALT: 24 U/L DA / AST: 20 U/L / GGT: x           Urinalysis Basic - ( 22 Sep 2022 09:07 )    Color: Yellow / Appearance: Clear / S.005 / pH: x  Gluc: x / Ketone: Negative  / Bili: Negative / Urobili: Negative mg/dL   Blood: x / Protein: 15 mg/dL / Nitrite: Negative   Leuk Esterase: Negative / RBC: 0-2 /HPF / WBC 0-2   Sq Epi: x / Non Sq Epi: Negative / Bacteria: Negative        Sodium, Serum: 134 ( @ 06:31)  Sodium, Serum: 134 ( @ 06:55)    Creatinine, Serum: 2.71 ( @ 06:31)  Creatinine, Serum: 2.80 ( @ 06:55)    Potassium, Serum: 4.9 ( @ 06:31)  Potassium, Serum: 5.7 ( @ 06:55)    Hemoglobin: 11.4 ( @ 06:31)  Hemoglobin: 12.3 ( @ 06:55)      < from: US Renal (22 @ 13:13) >    ACC: [id number] EXAM:  US KIDNEY(S)                          PROCEDURE DATE:  2022          INTERPRETATION:  CLINICAL INFORMATION: Acute renal failure    COMPARISON: CT abdomen 2018.    TECHNIQUE: Sonography of the kidneys and bladder.    FINDINGS:  Right kidney 9.3 cm the left 10.4 cm. Bilateral prominent extrarenal   pelvis similar to prior CT of 2018. No hydronephrosis, shadowing   calculus solid or cystic mass bilaterally.  Bladder distends up to 403 cc without wall thickeningor abnormal   intraluminal echoes. Post void residual not obtained.    IMPRESSION: No hydronephrosis bilaterally        --- End of Report ---            MYNOR SMITH MD; Attending Radiologist  This document has been electronically signed. Sep 22 2022  1:29PM    < end of copied text >

## 2022-09-23 NOTE — CHART NOTE - NSCHARTNOTEFT_GEN_A_CORE
Shortly after patient received PRN Hydralazine IVP this AM for SBP >180, patient noted to be complaining of mild swelling of face/lips, slight scattered pink rash noted on chest and c/o nausea.     Pt states she has never taken Hydralazine in the past. States she believes she had an allergic reaction in the past when taking Augmentin, which caused stomach discomfort, however denies any other allergies. Pt denies HA/dizziness, CP, SOB, abdominal pain. Satting well on NC, HD stable.     Due for Solu-medrol 60mg IVP this AM for hx of COPD. Will give now w/ Benadryl 50mg IVPx1 and Pepcid 20mg IVPx1. Hydralazine IVP PRN d/c'd. Allergy added to chart. If PRN IVP anti-hypertensives required further will utilize Labetalol IVP.

## 2022-09-23 NOTE — PROGRESS NOTE ADULT - SUBJECTIVE AND OBJECTIVE BOX
Chief Complaint: Shortness of breath    Interval Events: No events overnight.    Review of Systems:  General: No fevers, chills, weight gain  Skin: No rashes, color changes  Cardiovascular: No chest pain, orthopnea  Respiratory: No shortness of breath, cough  Gastrointestinal: No nausea, abdominal pain  Genitourinary: No incontinence, pain with urination  Musculoskeletal: No pain, swelling, decreased range of motion  Neurological: No headache, weakness  Psychiatric: No depression, anxiety  Endocrine: No weight gain, increased thirst  All other systems are comprehensively negative.    Physical Exam:  Vitals:        Vital Signs Last 24 Hrs  T(C): 36.8 (23 Sep 2022 04:15), Max: 36.9 (22 Sep 2022 16:05)  T(F): 98.2 (23 Sep 2022 04:15), Max: 98.5 (22 Sep 2022 16:05)  HR: 89 (23 Sep 2022 07:00) (71 - 101)  BP: 156/85 (23 Sep 2022 07:00) (135/67 - 182/76)  BP(mean): 107 (23 Sep 2022 07:00) (81 - 109)  RR: 20 (23 Sep 2022 07:00) (17 - 24)  SpO2: 100% (23 Sep 2022 07:00) (98% - 100%)  Parameters below as of 23 Sep 2022 06:45  Patient On (Oxygen Delivery Method): nasal cannula,2lpm  General: NAD  HEENT: MMM  Neck: No JVD, no carotid bruit  Lungs: CTAB  CV: RRR, nl S1/S2, no M/R/G  Abdomen: S/NT/ND, +BS  Extremities: No LE edema, no cyanosis  Neuro: AAOx3, non-focal  Skin: No rash    Labs:                        11.4   3.80  )-----------( 228      ( 23 Sep 2022 06:31 )             35.1     09-23    134<L>  |  99  |  60<H>  ----------------------------<  147<H>  4.9   |  22  |  2.71<H>    Ca    9.8      23 Sep 2022 06:31  Phos  6.2     09-23  Mg     1.9     09-23    TPro  8.6<H>  /  Alb  3.8  /  TBili  0.5  /  DBili  x   /  AST  20  /  ALT  24  /  AlkPhos  49  09-23            Telemetry: Sinus rhythm

## 2022-09-24 LAB
ANA PAT FLD IF-IMP: ABNORMAL
ANA TITR SER: ABNORMAL
ANION GAP SERPL CALC-SCNC: 11 MMOL/L — SIGNIFICANT CHANGE UP (ref 5–17)
BUN SERPL-MCNC: 77 MG/DL — HIGH (ref 7–23)
CALCIUM SERPL-MCNC: 10.1 MG/DL — SIGNIFICANT CHANGE UP (ref 8.4–10.5)
CHLORIDE SERPL-SCNC: 95 MMOL/L — LOW (ref 96–108)
CO2 SERPL-SCNC: 26 MMOL/L — SIGNIFICANT CHANGE UP (ref 22–31)
CREAT SERPL-MCNC: 3.05 MG/DL — HIGH (ref 0.5–1.3)
EGFR: 16 ML/MIN/1.73M2 — LOW
GLUCOSE SERPL-MCNC: 130 MG/DL — HIGH (ref 70–99)
HCT VFR BLD CALC: 35.3 % — SIGNIFICANT CHANGE UP (ref 34.5–45)
HGB BLD-MCNC: 11.9 G/DL — SIGNIFICANT CHANGE UP (ref 11.5–15.5)
MCHC RBC-ENTMCNC: 31.9 PG — SIGNIFICANT CHANGE UP (ref 27–34)
MCHC RBC-ENTMCNC: 33.7 GM/DL — SIGNIFICANT CHANGE UP (ref 32–36)
MCV RBC AUTO: 94.6 FL — SIGNIFICANT CHANGE UP (ref 80–100)
NRBC # BLD: 0 /100 WBCS — SIGNIFICANT CHANGE UP (ref 0–0)
PLATELET # BLD AUTO: 254 K/UL — SIGNIFICANT CHANGE UP (ref 150–400)
POTASSIUM SERPL-MCNC: 4.5 MMOL/L — SIGNIFICANT CHANGE UP (ref 3.5–5.3)
POTASSIUM SERPL-SCNC: 4.5 MMOL/L — SIGNIFICANT CHANGE UP (ref 3.5–5.3)
RBC # BLD: 3.73 M/UL — LOW (ref 3.8–5.2)
RBC # FLD: 14.7 % — HIGH (ref 10.3–14.5)
SODIUM SERPL-SCNC: 132 MMOL/L — LOW (ref 135–145)
WBC # BLD: 9.03 K/UL — SIGNIFICANT CHANGE UP (ref 3.8–10.5)
WBC # FLD AUTO: 9.03 K/UL — SIGNIFICANT CHANGE UP (ref 3.8–10.5)

## 2022-09-24 PROCEDURE — 99232 SBSQ HOSP IP/OBS MODERATE 35: CPT

## 2022-09-24 RX ORDER — SENNA PLUS 8.6 MG/1
2 TABLET ORAL AT BEDTIME
Refills: 0 | Status: DISCONTINUED | OUTPATIENT
Start: 2022-09-24 | End: 2022-09-29

## 2022-09-24 RX ORDER — FUROSEMIDE 40 MG
40 TABLET ORAL DAILY
Refills: 0 | Status: DISCONTINUED | OUTPATIENT
Start: 2022-09-25 | End: 2022-09-28

## 2022-09-24 RX ORDER — ONDANSETRON 8 MG/1
4 TABLET, FILM COATED ORAL ONCE
Refills: 0 | Status: COMPLETED | OUTPATIENT
Start: 2022-09-24 | End: 2022-09-24

## 2022-09-24 RX ORDER — CARVEDILOL PHOSPHATE 80 MG/1
12.5 CAPSULE, EXTENDED RELEASE ORAL EVERY 12 HOURS
Refills: 0 | Status: DISCONTINUED | OUTPATIENT
Start: 2022-09-24 | End: 2022-09-29

## 2022-09-24 RX ADMIN — Medication 75 MICROGRAM(S): at 06:09

## 2022-09-24 RX ADMIN — Medication 3 MILLILITER(S): at 22:56

## 2022-09-24 RX ADMIN — ONDANSETRON 4 MILLIGRAM(S): 8 TABLET, FILM COATED ORAL at 08:49

## 2022-09-24 RX ADMIN — CARVEDILOL PHOSPHATE 6.25 MILLIGRAM(S): 80 CAPSULE, EXTENDED RELEASE ORAL at 06:10

## 2022-09-24 RX ADMIN — SENNA PLUS 2 TABLET(S): 8.6 TABLET ORAL at 21:14

## 2022-09-24 RX ADMIN — Medication 3 MILLILITER(S): at 15:04

## 2022-09-24 RX ADMIN — Medication 1 PATCH: at 07:34

## 2022-09-24 RX ADMIN — Medication 40 MILLIGRAM(S): at 06:10

## 2022-09-24 RX ADMIN — Medication 650 MILLIGRAM(S): at 22:13

## 2022-09-24 RX ADMIN — Medication 3 MILLILITER(S): at 07:41

## 2022-09-24 RX ADMIN — Medication 1 PATCH: at 19:00

## 2022-09-24 RX ADMIN — HEPARIN SODIUM 5000 UNIT(S): 5000 INJECTION INTRAVENOUS; SUBCUTANEOUS at 14:04

## 2022-09-24 RX ADMIN — Medication 1 PATCH: at 21:14

## 2022-09-24 RX ADMIN — Medication 1 PATCH: at 21:35

## 2022-09-24 RX ADMIN — CARVEDILOL PHOSPHATE 12.5 MILLIGRAM(S): 80 CAPSULE, EXTENDED RELEASE ORAL at 18:10

## 2022-09-24 RX ADMIN — Medication 60 MILLIGRAM(S): at 06:10

## 2022-09-24 RX ADMIN — Medication 650 MILLIGRAM(S): at 21:13

## 2022-09-24 RX ADMIN — POLYETHYLENE GLYCOL 3350 17 GRAM(S): 17 POWDER, FOR SOLUTION ORAL at 11:20

## 2022-09-24 RX ADMIN — HEPARIN SODIUM 5000 UNIT(S): 5000 INJECTION INTRAVENOUS; SUBCUTANEOUS at 06:10

## 2022-09-24 RX ADMIN — HEPARIN SODIUM 5000 UNIT(S): 5000 INJECTION INTRAVENOUS; SUBCUTANEOUS at 21:13

## 2022-09-24 RX ADMIN — AMLODIPINE BESYLATE 5 MILLIGRAM(S): 2.5 TABLET ORAL at 06:10

## 2022-09-24 NOTE — PROGRESS NOTE ADULT - SUBJECTIVE AND OBJECTIVE BOX
Date/Time Patient Seen:  		  Referring MD:   Data Reviewed	       Patient is a 72y old  Female who presents with a chief complaint of SOB (24 Sep 2022 06:09)      Subjective/HPI     PAST MEDICAL & SURGICAL HISTORY:  COPD exacerbation          Medication list         MEDICATIONS  (STANDING):  albuterol/ipratropium for Nebulization 3 milliLiter(s) Nebulizer every 8 hours  amLODIPine   Tablet 5 milliGRAM(s) Oral daily  carvedilol 6.25 milliGRAM(s) Oral every 12 hours  furosemide   Injectable 40 milliGRAM(s) IV Push every 12 hours  heparin   Injectable 5000 Unit(s) SubCutaneous every 8 hours  levothyroxine 75 MICROGram(s) Oral daily  nicotine -  14 mG/24Hr(s) Patch 1 Patch Transdermal daily  polyethylene glycol 3350 17 Gram(s) Oral daily  predniSONE   Tablet 10 milliGRAM(s) Oral daily    MEDICATIONS  (PRN):  acetaminophen     Tablet .. 650 milliGRAM(s) Oral every 6 hours PRN Temp greater or equal to 38C (100.4F), Mild Pain (1 - 3)  albuterol/ipratropium for Nebulization 3 milliLiter(s) Nebulizer every 3 hours PRN Shortness of Breath and/or Wheezing  labetalol Injectable 10 milliGRAM(s) IV Push every 6 hours PRN Systolic blood pressure > 180  nitroglycerin     SubLingual 0.4 milliGRAM(s) SubLingual every 5 minutes PRN Chest Pain         Vitals log        ICU Vital Signs Last 24 Hrs  T(C): 36.7 (24 Sep 2022 04:01), Max: 36.9 (23 Sep 2022 12:00)  T(F): 98 (24 Sep 2022 04:01), Max: 98.4 (23 Sep 2022 12:00)  HR: 94 (24 Sep 2022 07:00) (70 - 101)  BP: 182/83 (24 Sep 2022 07:00) (136/69 - 191/86)  BP(mean): 109 (24 Sep 2022 07:00) (86 - 115)  ABP: --  ABP(mean): --  RR: 19 (24 Sep 2022 07:00) (15 - 41)  SpO2: 100% (24 Sep 2022 07:00) (98% - 100%)    O2 Parameters below as of 23 Sep 2022 22:38  Patient On (Oxygen Delivery Method): room air                 Input and Output:  I&O's Detail    23 Sep 2022 07:01  -  24 Sep 2022 07:00  --------------------------------------------------------  IN:    Oral Fluid: 1200 mL  Total IN: 1200 mL    OUT:    Voided (mL): 1450 mL  Total OUT: 1450 mL    Total NET: -250 mL          Lab Data                        11.4   3.80  )-----------( 228      ( 23 Sep 2022 06:31 )             35.1     09-23    134<L>  |  99  |  60<H>  ----------------------------<  147<H>  4.9   |  22  |  2.71<H>    Ca    9.8      23 Sep 2022 06:31  Phos  6.2     09-23  Mg     1.9     09-23    TPro  8.6<H>  /  Alb  3.8  /  TBili  0.5  /  DBili  x   /  AST  20  /  ALT  24  /  AlkPhos  49  09-23            Review of Systems	      Objective     Physical Examination    heart s1s2  lung dc BS  abd soft  head nc      Pertinent Lab findings & Imaging      Tish:  NO   Adequate UO     I&O's Detail    23 Sep 2022 07:01  -  24 Sep 2022 07:00  --------------------------------------------------------  IN:    Oral Fluid: 1200 mL  Total IN: 1200 mL    OUT:    Voided (mL): 1450 mL  Total OUT: 1450 mL    Total NET: -250 mL               Discussed with:     Cultures:	        Radiology

## 2022-09-24 NOTE — PROGRESS NOTE ADULT - SUBJECTIVE AND OBJECTIVE BOX
[facility] [address]    [address]    [place] [address] [place] [address]    Phone:  [phone]    Fax:  [phone]       Thank You for choosing us for your health care visit. We are glad to serve you and happy to provide you with this summary of your visit. Please help us to ensure we have accurate records. If you find anything that needs to be changed, please let our staff know as soon as possible.          Your Demographic Information     Patient Name Sex     Jr Michelle Male 1950       Ethnic Group Patient Race    Not of  or  Origin White      Your Visit Details     Date & Time Provider Department    2017 9:00 AM Elda Blanco MD [facility] [address]      Your Upcoming Appointment*(Max 10)     2018  9:00 AM CST Medicare Wellness Visit with Follow-Up with Elda Blanco MD   [facility] [address] ([facility])    [address] [address] [phone]              Your To Do List     Future Orders Please Complete On or Around Expires    COMPREHENSIVE METABOLIC PANEL      LIPID PANEL WITH REFLEX      PROSTATE SPECIFIC ANTIGEN      Follow-Up    Return in about 1 year (around 2018) for MED--SUB----F/U.      Conditions Discussed Today or Order-Related Diagnoses        Comments    Annual physical exam    -  Primary     Left inguinal hernia         Prostatic hypertrophy         Erectile dysfunction, unspecified erectile dysfunction type         Essential hypertension         Seasonal allergic rhinitis due to pollen         Screening for ischemic heart disease           Your Vitals Were     BP Pulse Resp Height Weight SpO2    118/82 81 16 6' (1.829 m) 221 lb (100.2 kg) 95%    BMI Smoking Status                29.97 kg/m2 Current Some Day Smoker          Medications Prescribed or Re-Ordered Today     Chief Complaint: Shortness of breath    Interval Events: No events overnight.    Review of Systems:  General: No fevers, chills, weight gain  Skin: No rashes, color changes  Cardiovascular: No chest pain, orthopnea  Respiratory: No shortness of breath, cough  Gastrointestinal: No nausea, abdominal pain  Genitourinary: No incontinence, pain with urination  Musculoskeletal: No pain, swelling, decreased range of motion  Neurological: No headache, weakness  Psychiatric: No depression, anxiety  Endocrine: No weight gain, increased thirst  All other systems are comprehensively negative.    Physical Exam:  Vital Signs Last 24 Hrs  T(C): 36.9 (24 Sep 2022 08:00), Max: 36.9 (23 Sep 2022 12:00)  T(F): 98.5 (24 Sep 2022 08:00), Max: 98.5 (24 Sep 2022 08:00)  HR: 92 (24 Sep 2022 08:00) (70 - 99)  BP: 183/97 (24 Sep 2022 08:00) (136/69 - 186/84)  BP(mean): 119 (24 Sep 2022 08:00) (86 - 119)  RR: 17 (24 Sep 2022 08:00) (15 - 41)  SpO2: 100% (24 Sep 2022 08:00) (98% - 100%)  Parameters below as of 24 Sep 2022 08:00  Patient On (Oxygen Delivery Method): room air  General: NAD  HEENT: MMM  Neck: No JVD, no carotid bruit  Lungs: CTAB  CV: RRR, nl S1/S2, no M/R/G  Abdomen: S/NT/ND, +BS  Extremities: No LE edema, no cyanosis  Neuro: AAOx3, non-focal  Skin: No rash    Labs:             09-24    132<L>  |  95<L>  |  77<H>  ----------------------------<  130<H>  4.5   |  26  |  3.05<H>    Ca    10.1      24 Sep 2022 08:24  Phos  6.2     09-23  Mg     1.9     09-23    TPro  8.6<H>  /  Alb  3.8  /  TBili  0.5  /  DBili  x   /  AST  20  /  ALT  24  /  AlkPhos  49  09-23                        11.9   9.03  )-----------( 254      ( 24 Sep 2022 08:24 )             35.3       Telemetry: Sinus rhythm tadalafil (CIALIS) 5 MG tablet    Sig - Route: Take 1 tablet by mouth as needed for Erectile Dysfunction. - Oral    Class: Normal    Pharmacy: [facility] [address] [place] [address] [id number] [place] [address] #: [phone]      Your Current Medications Are        Disp Refills Start End    Omega 3 1000 MG capsule        Sig - Route: Take 1,000 mg by mouth daily. - Oral    Class: Historical Med    VITAMIN D, CHOLECALCIFEROL, PO        Sig - Route: Take 1 tablet by mouth daily. - Oral    Class: Historical Med    aspirin (ASPIRIN) 81 MG EC tablet        Sig - Route: Take 1 tablet by mouth daily. - Oral    Class: Historical Med    tadalafil (CIALIS) 5 MG tablet 30 tablet 0 1/5/2017     Sig - Route: Take 1 tablet by mouth as needed for Erectile Dysfunction. - Oral    HYDROcodone-acetaminophen (NORCO) 5-325 MG per tablet 60 tablet 0 1/2/2015     Sig - Route: Take 1 tablet by mouth 2 times daily as needed for Pain. - Oral      Discontinued Medications        Reason for Discontinue    sildenafil (VIAGRA) 100 MG tablet Alternate Therapy      Allergies     Seasonal       Immunizations History as of 1/5/2017     Name Date    Hepatitis B Adult 9/17/1997, 12/11/1996, 11/11/1996    Influenza High Dose Pres Free 10/10/2016    Tdap 6/19/2007      Problem List as of 1/5/2017     Hypertension    Erectile dysfunction    Prostatic hypertrophy    Seasonal allergies    Left inguinal hernia              Patient Instructions    If you had any lab work or radiology testing ordered at your visit today, please allow 7-10 business days from the date your test was performed for the results to be reviewed and sent by our office. If you utilize [employer], please know that your test results may be posted online within a few hours or a few weeks, depending on the type of test you have done. Keep in mind, your doctor may not always have had the opportunity to review your results before they are released to your [employer]  account.       In the next few weeks you may receive a [employer] survey regarding your most recent clinic visit with us.  Please take a few moments out of your day to accurately evaluate your visit.  We strive to provide you with the best medical care and hope you are happy with our services 100% of the time. Again, thank you for your time and we look forward to your next visit.

## 2022-09-24 NOTE — PROGRESS NOTE ADULT - ASSESSMENT
71 y/o F with a h/o CAD, Lupus, MCTD, Raynauds, RA, Hypothyroidism, HTN, CKD3, and recent COVID in June 2022, with:    Acute hypoxemic respiratory failure, acute diastolic heart failure, suspected ILD, NED.    - supplemental O2 PRN to maintain spO2 > 92%  - appears to be responding to diuresis, continue as per cardiology, monitor renal indices closely  - strong suspicion for ILD given MCTD history, rheumatology input appreciated  - deescalate steroids to prednisone 10mg QD as per rheumatology  - will need follow up CT chest  - hold ACE-I, afterload reduction with amlodipine and carvedilol, IV labetalol PRN (had allergic reaction to hydralazine the other night)    Case discussed in detail with the patient, her , and her daughter at the bedside overnight. All questions answered and concerns addressed.

## 2022-09-24 NOTE — PROGRESS NOTE ADULT - ASSESSMENT
72F CAD, Lupus, Hypothyroidism, HTN, CKD3, and recent COVID in June 2022 admitted for Acute Hypoxic Respiratory Failure.     Acute Hypoxic Respiratory Failure  CT Chest reviewed. Has Pulmonary Edema and history of CAD; Echo with diastolic Dysfunction and EF 45-50%  Given history of MCTD will need to be concerned for ILD and PAH  Lasix IV Daily and monitor Urine output; Supplemental O2 PRN   Pulmonary and Cardiology Consultation noted     Acute Renal Failure on CKD 3   Baseline Creatinine around 1.0; Renal US Reviewed   Adequate Urine output  Could be Pre-Renal and also consider Scleroderma Renal Crisis  Monitor BMP and Electrolytes     MCTD   Confirmed with her Rheumatologist that she has features of Scleroderma and SLE   Follow up VICENTE, RNP, SCL 70, RF, ESR and Centromere   Will need to consider Scleroderma Renal Crisis and have reached out to Rheumatology for possible consult   Currently we are giving her non pulse dose steroids    CAD / HTN   Patient BP uncontrolled; History of PCI  Lasix and Hydralazine; Hold ACE I but may resume after discussion with Rheum and Nephro    Hypothyroidism  Follow up TSH and Free T4  Synthroid increased    Diet  Regular    DVT Prophylaxis  Heparin SC     Disposition   Discharge planning pending hospital course                72F CAD, Lupus, Hypothyroidism, HTN, CKD3, and recent COVID in June 2022 admitted for Acute Hypoxic Respiratory Failure.     Acute Hypoxic Respiratory Failure  CT Chest reviewed. Has Pulmonary Edema and history of CAD; Echo with diastolic Dysfunction and EF 45-50%  Given history of MCTD will need to be concerned for ILD and PAH  Lasix IV Daily and monitor Urine output; Supplemental O2 PRN   Pulmonary and Cardiology Consultation noted   diuretic holiday per nephro, resume PO lasix tomorrow    Acute Renal Failure on CKD 3   Baseline Creatinine around 1.0; Renal US Reviewed   Adequate Urine output  Could be Pre-Renal and also consider Scleroderma Renal Crisis  Monitor BMP and Electrolytes   nephro consulted - Renal artery duplex, Bladder scan, Follow paraprotein screen, PTH; ionized Ca    MCTD   Confirmed with her Rheumatologist that she has features of Scleroderma and SLE   Follow up VICENTE, RNP, SCL 70, RF, ESR and Centromere   Will need to consider Scleroderma Renal Crisis and have reached out to Rheumatology-consult appreciated, reduce steroids to 10 mg daily  Currently we are giving her non pulse dose steroids - reduced to 10 mg daily    CAD / HTN   Patient BP uncontrolled; History of PCI  Lasix and Hydralazine; Hold ACE I but may resume after discussion with Rheum and Nephro  BP continues to remain uncontrolled  cont Amlodipine 5, labetalol IV PRN    Hypothyroidism  Follow up TSH and Free T4  Synthroid increased    Diet  Regular    DVT Prophylaxis  Heparin SC     Disposition   Discharge planning pending hospital course                72F CAD, Lupus, Hypothyroidism, HTN, CKD3, and recent COVID in June 2022 admitted for Acute Hypoxic Respiratory Failure.     Acute Hypoxic Respiratory Failure  CT Chest reviewed. Has Pulmonary Edema and history of CAD; Echo with diastolic Dysfunction and EF 45-50%  Given history of MCTD will need to be concerned for ILD and PAH  Lasix IV Daily and monitor Urine output; Supplemental O2 PRN   Pulmonary and Cardiology Consultation noted   diuretic holiday per nephro, resume PO lasix tomorrow    Acute Renal Failure on CKD 3   Baseline Creatinine around 1.0; Renal US Reviewed   Adequate Urine output  Could be Pre-Renal and also consider Scleroderma Renal Crisis  Monitor BMP and Electrolytes   nephro consulted - Renal artery duplex, Bladder scan, Follow paraprotein screen, PTH; ionized Ca    MCTD   Confirmed with her Rheumatologist that she has features of Scleroderma and SLE   Follow up VICENTE, RNP, SCL 70, RF, ESR and Centromere   Will need to consider Scleroderma Renal Crisis and have reached out to Rheumatology-consult appreciated, reduce steroids to 10 mg daily  Currently we are giving her non pulse dose steroids - reduced to 10 mg daily    CAD / HTN   Patient BP uncontrolled; History of PCI  Lasix and Hydralazine; Hold ACE I but may resume after discussion with Rheum and Nephro  BP continues to remain uncontrolled, increase carvedilol to 12.5 mg BID  cont Amlodipine 5, labetalol IV PRN    Hypothyroidism  Follow up TSH and Free T4  Synthroid increased    Diet  Regular    DVT Prophylaxis  Heparin SC     Disposition   Discharge planning pending hospital course

## 2022-09-24 NOTE — PROGRESS NOTE ADULT - SUBJECTIVE AND OBJECTIVE BOX
[facility] NEPHROLOGY SERVICES, [facility]  NEPHROLOGY AND HYPERTENSION  [address]  [address]  [place] [address]  [phone]    MD GERALDINE WINSTON MD ANDREY GONCHARUK, MD MADHU KORRAPATI, MD YELENA ROSENBERG, MD BINNY KOSHY, MD CHRISTOPHER CAPUTO, MD TANNA YO MD          Patient events noted  Feels better no distress    MEDICATIONS  (STANDING):  albuterol/ipratropium for Nebulization 3 milliLiter(s) Nebulizer every 8 hours  amLODIPine   Tablet 5 milliGRAM(s) Oral daily  carvedilol 12.5 milliGRAM(s) Oral every 12 hours  heparin   Injectable 5000 Unit(s) SubCutaneous every 8 hours  levothyroxine 75 MICROGram(s) Oral daily  nicotine -  14 mG/24Hr(s) Patch 1 Patch Transdermal daily  polyethylene glycol 3350 17 Gram(s) Oral daily  predniSONE   Tablet 10 milliGRAM(s) Oral daily    MEDICATIONS  (PRN):  acetaminophen     Tablet .. 650 milliGRAM(s) Oral every 6 hours PRN Temp greater or equal to 38C (100.4F), Mild Pain (1 - 3)  albuterol/ipratropium for Nebulization 3 milliLiter(s) Nebulizer every 3 hours PRN Shortness of Breath and/or Wheezing  labetalol Injectable 10 milliGRAM(s) IV Push every 6 hours PRN Systolic blood pressure > 180  nitroglycerin     SubLingual 0.4 milliGRAM(s) SubLingual every 5 minutes PRN Chest Pain      09-23-22 @ 07:01  -  09-24-22 @ 07:00  --------------------------------------------------------  IN: 1200 mL / OUT: 1450 mL / NET: -250 mL      PHYSICAL EXAM:      T(C): 36.9 (09-24-22 @ 08:00), Max: 36.9 (09-23-22 @ 12:00)  HR: 92 (09-24-22 @ 08:00) (70 - 99)  BP: 183/97 (09-24-22 @ 08:00) (136/69 - 186/84)  RR: 17 (09-24-22 @ 08:00) (15 - 41)  SpO2: 100% (09-24-22 @ 08:00) (98% - 100%)  Wt(kg): --  Lungs clear ant decreased BS at bases   Heart S1S2  Abd soft NT ND  Extremities:   tr edema                                    11.9   9.03  )-----------( 254      ( 24 Sep 2022 08:24 )             35.3     09-24    132<L>  |  95<L>  |  77<H>  ----------------------------<  130<H>  4.5   |  26  |  3.05<H>    Ca    10.1      24 Sep 2022 08:24  Phos  6.2     09-23  Mg     1.9     09-23    TPro  8.6<H>  /  Alb  3.8  /  TBili  0.5  /  DBili  x   /  AST  20  /  ALT  24  /  AlkPhos  49  09-23      LIVER FUNCTIONS - ( 23 Sep 2022 06:31 )  Alb: 3.8 g/dL / Pro: 8.6 g/dL / ALK PHOS: 49 U/L / ALT: 24 U/L DA / AST: 20 U/L / GGT: x           Creatinine Trend: 3.05<--, 2.71<--, 2.80<--  Trend Bun/Cr  09-24-22 @ 08:24  BUN/CR -  77<H> / 3.05<H>  09-23-22 @ 06:31  BUN/CR -  60<H> / 2.71<H>  09-22-22 @ 06:55  BUN/CR -  61<H> / 2.80<H>      Assessment     NED, suspected CKD; pre renal azotemia; warranted diuresis;   Accelerated HTN; ?flash pulm edema; moderate AS  R/O SHILA;   Hypercalcemia     Plan:  Diuretic holiday today; resume po tomorrow  Renal artery duplex  Follow paraprotien screen, PTH; ionized Ca    Rene Collins MD [facility] NEPHROLOGY SERVICES, [facility]  NEPHROLOGY AND HYPERTENSION  [address]  [address]  [place] [address]  [phone]    MD GERALDINE WINSTON MD ANDREY GONCHARUK, MD MADHU KORRAPATI, MD YELENA ROSENBERG, MD BINNY KOSHY, MD CHRISTOPHER CAPUTO, MD TANNA YO MD          Patient events noted  Feels better no distress    MEDICATIONS  (STANDING):  albuterol/ipratropium for Nebulization 3 milliLiter(s) Nebulizer every 8 hours  amLODIPine   Tablet 5 milliGRAM(s) Oral daily  carvedilol 12.5 milliGRAM(s) Oral every 12 hours  heparin   Injectable 5000 Unit(s) SubCutaneous every 8 hours  levothyroxine 75 MICROGram(s) Oral daily  nicotine -  14 mG/24Hr(s) Patch 1 Patch Transdermal daily  polyethylene glycol 3350 17 Gram(s) Oral daily  predniSONE   Tablet 10 milliGRAM(s) Oral daily    MEDICATIONS  (PRN):  acetaminophen     Tablet .. 650 milliGRAM(s) Oral every 6 hours PRN Temp greater or equal to 38C (100.4F), Mild Pain (1 - 3)  albuterol/ipratropium for Nebulization 3 milliLiter(s) Nebulizer every 3 hours PRN Shortness of Breath and/or Wheezing  labetalol Injectable 10 milliGRAM(s) IV Push every 6 hours PRN Systolic blood pressure > 180  nitroglycerin     SubLingual 0.4 milliGRAM(s) SubLingual every 5 minutes PRN Chest Pain      09-23-22 @ 07:01  -  09-24-22 @ 07:00  --------------------------------------------------------  IN: 1200 mL / OUT: 1450 mL / NET: -250 mL      PHYSICAL EXAM:      T(C): 36.9 (09-24-22 @ 08:00), Max: 36.9 (09-23-22 @ 12:00)  HR: 92 (09-24-22 @ 08:00) (70 - 99)  BP: 183/97 (09-24-22 @ 08:00) (136/69 - 186/84)  RR: 17 (09-24-22 @ 08:00) (15 - 41)  SpO2: 100% (09-24-22 @ 08:00) (98% - 100%)  Wt(kg): --  Lungs clear ant decreased BS at bases   Heart S1S2  Abd soft NT ND  Extremities:   tr edema                                    11.9   9.03  )-----------( 254      ( 24 Sep 2022 08:24 )             35.3     09-24    132<L>  |  95<L>  |  77<H>  ----------------------------<  130<H>  4.5   |  26  |  3.05<H>    Ca    10.1      24 Sep 2022 08:24  Phos  6.2     09-23  Mg     1.9     09-23    TPro  8.6<H>  /  Alb  3.8  /  TBili  0.5  /  DBili  x   /  AST  20  /  ALT  24  /  AlkPhos  49  09-23      LIVER FUNCTIONS - ( 23 Sep 2022 06:31 )  Alb: 3.8 g/dL / Pro: 8.6 g/dL / ALK PHOS: 49 U/L / ALT: 24 U/L DA / AST: 20 U/L / GGT: x           Creatinine Trend: 3.05<--, 2.71<--, 2.80<--  Trend Bun/Cr  09-24-22 @ 08:24  BUN/CR -  77<H> / 3.05<H>  09-23-22 @ 06:31  BUN/CR -  60<H> / 2.71<H>  09-22-22 @ 06:55  BUN/CR -  61<H> / 2.80<H>      Assessment     NED, suspected CKD; pre renal azotemia; warranted diuresis;   Accelerated HTN; ?flash pulm edema; moderate AS  R/O SHILA;   Hypercalcemia     Plan:  Diuretic holiday today; resume po tomorrow  Renal artery duplex  Bladder scan  Follow paraprotien screen, PTH; ionized Ca    Rene Collins MD

## 2022-09-24 NOTE — PROGRESS NOTE ADULT - SUBJECTIVE AND OBJECTIVE BOX
***charting in progress***   Patient is a 72y old  Female who presents with a chief complaint of SOB (24 Sep 2022 07:00)      INTERVAL HPI/OVERNIGHT EVENTS: Patient seen and examined at bedside. No overnight events occurred. Patient has no complaints at this time. Denies fevers, chills, headache, lightheadedness, chest pain, dyspnea, abdominal pain, n/v/d/c.    MEDICATIONS  (STANDING):  albuterol/ipratropium for Nebulization 3 milliLiter(s) Nebulizer every 8 hours  amLODIPine   Tablet 5 milliGRAM(s) Oral daily  carvedilol 6.25 milliGRAM(s) Oral every 12 hours  furosemide   Injectable 40 milliGRAM(s) IV Push every 12 hours  heparin   Injectable 5000 Unit(s) SubCutaneous every 8 hours  levothyroxine 75 MICROGram(s) Oral daily  nicotine -  14 mG/24Hr(s) Patch 1 Patch Transdermal daily  polyethylene glycol 3350 17 Gram(s) Oral daily  predniSONE   Tablet 10 milliGRAM(s) Oral daily    MEDICATIONS  (PRN):  acetaminophen     Tablet .. 650 milliGRAM(s) Oral every 6 hours PRN Temp greater or equal to 38C (100.4F), Mild Pain (1 - 3)  albuterol/ipratropium for Nebulization 3 milliLiter(s) Nebulizer every 3 hours PRN Shortness of Breath and/or Wheezing  labetalol Injectable 10 milliGRAM(s) IV Push every 6 hours PRN Systolic blood pressure > 180  nitroglycerin     SubLingual 0.4 milliGRAM(s) SubLingual every 5 minutes PRN Chest Pain      Allergies    hydrALAZINE (Angioedema; Rash)    Intolerances        REVIEW OF SYSTEMS:  CONSTITUTIONAL: No fever or chills  HEENT:  No headache, no sore throat  RESPIRATORY: No cough, wheezing, or shortness of breath  CARDIOVASCULAR: No chest pain, palpitations  GASTROINTESTINAL: No abd pain, nausea, vomiting, or diarrhea  GENITOURINARY: No dysuria, frequency, or hematuria  NEUROLOGICAL: no focal weakness or dizziness  MUSCULOSKELETAL: no myalgias     Vital Signs Last 24 Hrs  T(C): 36.9 (24 Sep 2022 08:00), Max: 36.9 (23 Sep 2022 12:00)  T(F): 98.5 (24 Sep 2022 08:00), Max: 98.5 (24 Sep 2022 08:00)  HR: 92 (24 Sep 2022 08:00) (70 - 101)  BP: 183/97 (24 Sep 2022 08:00) (136/69 - 191/86)  BP(mean): 119 (24 Sep 2022 08:00) (86 - 119)  RR: 17 (24 Sep 2022 08:00) (15 - 41)  SpO2: 100% (24 Sep 2022 08:00) (98% - 100%)    Parameters below as of 24 Sep 2022 08:00  Patient On (Oxygen Delivery Method): ventilator    O2 Concentration (%): 40    PHYSICAL EXAM:  GENERAL: NAD  HEENT:  anicteric, moist mucous membranes  CHEST/LUNG:  CTA b/l, no rales, wheezes, or rhonchi  HEART:  RRR, S1, S2  ABDOMEN:  BS+, soft, nontender, nondistended  EXTREMITIES: no edema, cyanosis, or calf tenderness  NERVOUS SYSTEM: answers questions and follows commands appropriately    LABS:    CBC Full  -  ( 23 Sep 2022 06:31 )  WBC Count : 3.80 K/uL  Hemoglobin : 11.4 g/dL  Hematocrit : 35.1 %  Platelet Count - Automated : 228 K/uL  Mean Cell Volume : 95.6 fl  Mean Cell Hemoglobin : 31.1 pg  Mean Cell Hemoglobin Concentration : 32.5 gm/dL  Auto Neutrophil # : 2.97 K/uL  Auto Lymphocyte # : 0.55 K/uL  Auto Monocyte # : 0.25 K/uL  Auto Eosinophil # : 0.00 K/uL  Auto Basophil # : 0.00 K/uL  Auto Neutrophil % : 78.1 %  Auto Lymphocyte % : 14.5 %  Auto Monocyte % : 6.6 %  Auto Eosinophil % : 0.0 %  Auto Basophil % : 0.0 %      Ca    9.8        23 Sep 2022 06:31        Urinalysis Basic - ( 22 Sep 2022 09:07 )    Color: Yellow / Appearance: Clear / S.005 / pH: x  Gluc: x / Ketone: Negative  / Bili: Negative / Urobili: Negative mg/dL   Blood: x / Protein: 15 mg/dL / Nitrite: Negative   Leuk Esterase: Negative / RBC: 0-2 /HPF / WBC 0-2   Sq Epi: x / Non Sq Epi: Negative / Bacteria: Negative      CAPILLARY BLOOD GLUCOSE            Culture - Blood (collected 22 @ 07:00)  Source: .Blood Blood-Peripheral  Preliminary Report (22 @ 13:02):    No growth to date.    Culture - Blood (collected 22 @ 06:55)  Source: .Blood Blood-Peripheral  Preliminary Report (22 @ 13:02):    No growth to date.        RADIOLOGY & ADDITIONAL TESTS:    Personally reviewed.     Consultant(s) Notes Reviewed:  [x] YES  [ ] NO     Patient is a 72y old  Female who presents with a chief complaint of SOB (24 Sep 2022 07:00)      INTERVAL HPI/OVERNIGHT EVENTS: Patient seen and examined at bedside. Pt with nausea and epigastric discomfort this morning. Breathing is stable. Otherwise, patient has no complaints at this time. Denies fevers, chills, headache, lightheadedness, chest pain, dyspnea, diarrhea, constipation    MEDICATIONS  (STANDING):  albuterol/ipratropium for Nebulization 3 milliLiter(s) Nebulizer every 8 hours  amLODIPine   Tablet 5 milliGRAM(s) Oral daily  carvedilol 6.25 milliGRAM(s) Oral every 12 hours  furosemide   Injectable 40 milliGRAM(s) IV Push every 12 hours  heparin   Injectable 5000 Unit(s) SubCutaneous every 8 hours  levothyroxine 75 MICROGram(s) Oral daily  nicotine -  14 mG/24Hr(s) Patch 1 Patch Transdermal daily  polyethylene glycol 3350 17 Gram(s) Oral daily  predniSONE   Tablet 10 milliGRAM(s) Oral daily    MEDICATIONS  (PRN):  acetaminophen     Tablet .. 650 milliGRAM(s) Oral every 6 hours PRN Temp greater or equal to 38C (100.4F), Mild Pain (1 - 3)  albuterol/ipratropium for Nebulization 3 milliLiter(s) Nebulizer every 3 hours PRN Shortness of Breath and/or Wheezing  labetalol Injectable 10 milliGRAM(s) IV Push every 6 hours PRN Systolic blood pressure > 180  nitroglycerin     SubLingual 0.4 milliGRAM(s) SubLingual every 5 minutes PRN Chest Pain      Allergies    hydrALAZINE (Angioedema; Rash)    Intolerances        REVIEW OF SYSTEMS:  CONSTITUTIONAL: No fever or chills  HEENT:  No headache, no sore throat  RESPIRATORY: No cough, wheezing, or shortness of breath  CARDIOVASCULAR: No chest pain, palpitations  GASTROINTESTINAL: Admits nausea, No abd pain, vomiting, or diarrhea  GENITOURINARY: No dysuria, frequency, or hematuria  NEUROLOGICAL: no focal weakness or dizziness  MUSCULOSKELETAL: no myalgias     Vital Signs Last 24 Hrs  T(C): 36.9 (24 Sep 2022 08:00), Max: 36.9 (23 Sep 2022 12:00)  T(F): 98.5 (24 Sep 2022 08:00), Max: 98.5 (24 Sep 2022 08:00)  HR: 92 (24 Sep 2022 08:00) (70 - 101)  BP: 183/97 (24 Sep 2022 08:00) (136/69 - 191/86)  BP(mean): 119 (24 Sep 2022 08:00) (86 - 119)  RR: 17 (24 Sep 2022 08:00) (15 - 41)  SpO2: 100% (24 Sep 2022 08:00) (98% - 100%)    Parameters below as of 24 Sep 2022 08:00  Patient On (Oxygen Delivery Method): ROOM AIR    PHYSICAL EXAM:  GENERAL: NAD  HEENT:  anicteric, moist mucous membranes  CHEST/LUNG:  CTA b/l, no rales, wheezes, or rhonchi  HEART:  RRR, S1, S2, systolic murmur  ABDOMEN:  BS+, soft, nontender, nondistended  EXTREMITIES: no edema, cyanosis, or calf tenderness  NERVOUS SYSTEM: answers questions and follows commands appropriately    LABS:    CBC Full  -  ( 23 Sep 2022 06:31 )  WBC Count : 3.80 K/uL  Hemoglobin : 11.4 g/dL  Hematocrit : 35.1 %  Platelet Count - Automated : 228 K/uL  Mean Cell Volume : 95.6 fl  Mean Cell Hemoglobin : 31.1 pg  Mean Cell Hemoglobin Concentration : 32.5 gm/dL  Auto Neutrophil # : 2.97 K/uL  Auto Lymphocyte # : 0.55 K/uL  Auto Monocyte # : 0.25 K/uL  Auto Eosinophil # : 0.00 K/uL  Auto Basophil # : 0.00 K/uL  Auto Neutrophil % : 78.1 %  Auto Lymphocyte % : 14.5 %  Auto Monocyte % : 6.6 %  Auto Eosinophil % : 0.0 %  Auto Basophil % : 0.0 %      Ca    9.8        23 Sep 2022 06:31        Urinalysis Basic - ( 22 Sep 2022 09:07 )    Color: Yellow / Appearance: Clear / S.005 / pH: x  Gluc: x / Ketone: Negative  / Bili: Negative / Urobili: Negative mg/dL   Blood: x / Protein: 15 mg/dL / Nitrite: Negative   Leuk Esterase: Negative / RBC: 0-2 /HPF / WBC 0-2   Sq Epi: x / Non Sq Epi: Negative / Bacteria: Negative      CAPILLARY BLOOD GLUCOSE            Culture - Blood (collected 22 @ 07:00)  Source: .Blood Blood-Peripheral  Preliminary Report (22 @ 13:02):    No growth to date.    Culture - Blood (collected 22 @ 06:55)  Source: .Blood Blood-Peripheral  Preliminary Report (22 @ 13:02):    No growth to date.        RADIOLOGY & ADDITIONAL TESTS:    Personally reviewed.     Consultant(s) Notes Reviewed:  [x] YES  [ ] NO

## 2022-09-24 NOTE — PHYSICAL THERAPY INITIAL EVALUATION ADULT - ADDITIONAL COMMENTS
Pt reports she lives in a house with her spouse with 1 step to enter and a flight of stairs inside. Pt's daughter lives next door. Prior to admission pt was independent with all mobility, (+) driving. Pt has no DME.

## 2022-09-24 NOTE — PROGRESS NOTE ADULT - ASSESSMENT
72 y.o. F presents by EMS c/o not feeling well, initial call to EMS was for difficulty breathing, at home family mentioned possible facial droop, pt in significant respiratory distress on presentation to ED    resp distress  CHF  pleural eff  pulm edema  COPD  OP  OA  Ex Smoker  hx of Pulm Nodules    rheum eval noted  in light of pt's rheum conditions - and risk of renal compromise - systemic steroids reduced in dose  I and O  cardio follow up  spcu monitoring  lasix diuresis in progress    diuresis - cvs rx regimen optimization - cardio eval   I and O  serial labs  replete lytes  monitor VS and HD  card tele and pulse ox monitoring  VBG  NIPPV - prn use - o2 support - wean fio2 as tolerated - keep sat > 88 pct  COPD - Bronchodilators and short course of Systemic Steroids - low dose  CT imaging reviewed - esoph dilation - effusions - atelectasis  effusions - no indication for thoracentesis at present - med rx regimen - card rx   I anastasia as able to cooperate  asp prec  HOB elev  Serial Renal Indices -   GOC discussion

## 2022-09-24 NOTE — PROGRESS NOTE ADULT - ASSESSMENT
The patient is a 72 year old female with a history of HTN, HL, hypothyroidism who presents with shortness of breath.    Plan:  - ECG with nonspecific findings but no evidence of ischemia or infarction  - Troponin mildly elevated at 192 in the setting of demand ischemia from heart failure, NED and respiratory failure. No need to trend further.  - CT chest with mild pulm edema and trace pleural effusions  - Echo with mildly reduced LV systolic function, mod MR, mod AR  - Diuretics on hold as per renal  - Hold spironolactone and ramipril due to NED  - Continue aspirin 81 mg daily  - Continue atorvastatin 20 mg daily (formulary equivalent)  - Increase carvedilol to 12.5 mg bid  - Continue amlodipine 5 mg daily - can increase to 10 mg if needed

## 2022-09-24 NOTE — PROGRESS NOTE ADULT - SUBJECTIVE AND OBJECTIVE BOX
Patient is a 72y old  Female who presents with a chief complaint of Heart failure     (23 Sep 2022 16:29)      BRIEF HOSPITAL COURSE: 73 y/o F with a h/o CAD, Lupus, MCTD, Raynauds, RA, Hypothyroidism, HTN, CKD3, and recent COVID in 2022, admitted on  with complaints of SOB on exertion. CT Chest w/ multifocal ground glass opacities. Required NIPPV.      Events last 24 hours: ***        PAST MEDICAL & SURGICAL HISTORY:  COPD exacerbation          Review of Systems:  CONSTITUTIONAL: No fever, chills, or fatigue  EYES: No eye pain, visual disturbances, or discharge  ENMT:  No difficulty hearing, tinnitus, vertigo; No sinus or throat pain  NECK: No pain or stiffness  RESPIRATORY: No cough, wheezing, chills or hemoptysis; No shortness of breath  CARDIOVASCULAR: No chest pain, palpitations, dizziness, or leg swelling  GASTROINTESTINAL: No abdominal or epigastric pain. No nausea, vomiting, or hematemesis; No diarrhea or constipation. No melena or hematochezia.  GENITOURINARY: No dysuria, frequency, hematuria, or incontinence  NEUROLOGICAL: No headaches, memory loss, loss of strength, numbness, or tremors  SKIN: No itching, burning, rashes, or lesions   MUSCULOSKELETAL: No joint pain or swelling; No muscle, back, or extremity pain  PSYCHIATRIC: No depression, anxiety, mood swings, or difficulty sleeping      Medications:    amLODIPine   Tablet 5 milliGRAM(s) Oral daily  carvedilol 6.25 milliGRAM(s) Oral every 12 hours  furosemide   Injectable 40 milliGRAM(s) IV Push every 12 hours  labetalol Injectable 10 milliGRAM(s) IV Push every 6 hours PRN  nitroglycerin     SubLingual 0.4 milliGRAM(s) SubLingual every 5 minutes PRN    albuterol/ipratropium for Nebulization 3 milliLiter(s) Nebulizer every 8 hours  albuterol/ipratropium for Nebulization 3 milliLiter(s) Nebulizer every 3 hours PRN    acetaminophen     Tablet .. 650 milliGRAM(s) Oral every 6 hours PRN      heparin   Injectable 5000 Unit(s) SubCutaneous every 8 hours    polyethylene glycol 3350 17 Gram(s) Oral daily      levothyroxine 75 MICROGram(s) Oral daily  methylPREDNISolone sodium succinate Injectable 60 milliGRAM(s) IV Push daily          nicotine -  14 mG/24Hr(s) Patch 1 Patch Transdermal daily          ICU Vital Signs Last 24 Hrs  T(C): 36.7 (24 Sep 2022 04:01), Max: 36.9 (23 Sep 2022 12:00)  T(F): 98 (24 Sep 2022 04:01), Max: 98.4 (23 Sep 2022 12:00)  HR: 72 (24 Sep 2022 04:51) (72 - 101)  BP: 153/89 (23 Sep 2022 22:00) (136/69 - 191/86)  BP(mean): 104 (23 Sep 2022 22:00) (88 - 115)  ABP: --  ABP(mean): --  RR: 17 (23 Sep 2022 22:00) (17 - 41)  SpO2: 99% (24 Sep 2022 04:51) (98% - 100%)    O2 Parameters below as of 23 Sep 2022 22:38  Patient On (Oxygen Delivery Method): room air                I&O's Detail    22 Sep 2022 07:01  -  23 Sep 2022 07:00  --------------------------------------------------------  IN:  Total IN: 0 mL    OUT:    Voided (mL): 2660 mL  Total OUT: 2660 mL    Total NET: -2660 mL      23 Sep 2022 07:01  -  24 Sep 2022 06:09  --------------------------------------------------------  IN:    Oral Fluid: 720 mL  Total IN: 720 mL    OUT:    Voided (mL): 1250 mL  Total OUT: 1250 mL    Total NET: -530 mL            LABS:                        11.4   3.80  )-----------( 228      ( 23 Sep 2022 06:31 )             35.1     09-23    134<L>  |  99  |  60<H>  ----------------------------<  147<H>  4.9   |  22  |  2.71<H>    Ca    9.8      23 Sep 2022 06:31  Phos  6.2       Mg     1.9         TPro  8.6<H>  /  Alb  3.8  /  TBili  0.5  /  DBili  x   /  AST  20  /  ALT  24  /  AlkPhos  49            CAPILLARY BLOOD GLUCOSE      POCT Blood Glucose.: 113 mg/dL (22 Sep 2022 08:51)      Urinalysis Basic - ( 22 Sep 2022 09:07 )    Color: Yellow / Appearance: Clear / S.005 / pH: x  Gluc: x / Ketone: Negative  / Bili: Negative / Urobili: Negative mg/dL   Blood: x / Protein: 15 mg/dL / Nitrite: Negative   Leuk Esterase: Negative / RBC: 0-2 /HPF / WBC 0-2   Sq Epi: x / Non Sq Epi: Negative / Bacteria: Negative      CULTURES:  Rapid RVP Result: NotDetec (22 @ 07:00)  Culture Results:   No growth to date. (22 @ 07:00)  Culture Results:   No growth to date. (22 @ 06:55)        Physical Examination:    General: No acute distress.  Alert, oriented, interactive, nonfocal    HEENT: Pupils equal, reactive to light.  Symmetric.    PULM: Clear to auscultation bilaterally, no significant sputum production    CVS: Regular rate and rhythm, no murmurs, rubs, or gallops    ABD: Soft, nondistended, nontender, normoactive bowel sounds, no masses    EXT: No edema, nontender    SKIN: Warm and well perfused, no rashes noted.    NEURO: A&Ox3, strength 5/5 all extremities, cranial nerves grossly intact, no focal deficits        RADIOLOGY: ***        CRITICAL CARE TIME SPENT: ***  Time spent evaluating/treating patient with medical issues that pose a high risk for life threatening deterioration and/or end-organ damage, reviewing data/labs/imaging, discussing case with multidisciplinary team, discussing plan/goals of care with patient/family. Non-inclusive of procedure time.   Patient is a 72y old  Female who presents with a chief complaint of Heart failure     (23 Sep 2022 16:29)      BRIEF HOSPITAL COURSE: 71 y/o F with a h/o CAD, Lupus, MCTD, Raynauds, RA, Hypothyroidism, HTN, CKD3, and recent COVID in 2022, admitted on  with complaints of SOB on exertion. CT Chest w/ multifocal ground glass opacities. Required NIPPV.      Events last 24 hours: Tolerating room air. Net neg 3+ liters overall fluid status.        PAST MEDICAL & SURGICAL HISTORY:  COPD exacerbation          Review of Systems:  CONSTITUTIONAL: No fever, chills, or fatigue  EYES: No eye pain, visual disturbances, or discharge  ENMT:  No difficulty hearing, tinnitus, vertigo; No sinus or throat pain  NECK: No pain or stiffness  RESPIRATORY: No cough, wheezing, chills or hemoptysis; No shortness of breath  CARDIOVASCULAR: No chest pain, palpitations, dizziness, or leg swelling  GASTROINTESTINAL: No abdominal or epigastric pain. No nausea, vomiting, or hematemesis; No diarrhea or constipation. No melena or hematochezia.  GENITOURINARY: No dysuria, frequency, hematuria, or incontinence  NEUROLOGICAL: No headaches, memory loss, loss of strength, numbness, or tremors  SKIN: No itching, burning, rashes, or lesions   MUSCULOSKELETAL: No joint pain or swelling; No muscle, back, or extremity pain  PSYCHIATRIC: No depression, anxiety, mood swings, or difficulty sleeping      Medications:    amLODIPine   Tablet 5 milliGRAM(s) Oral daily  carvedilol 6.25 milliGRAM(s) Oral every 12 hours  furosemide   Injectable 40 milliGRAM(s) IV Push every 12 hours  labetalol Injectable 10 milliGRAM(s) IV Push every 6 hours PRN  nitroglycerin     SubLingual 0.4 milliGRAM(s) SubLingual every 5 minutes PRN  albuterol/ipratropium for Nebulization 3 milliLiter(s) Nebulizer every 8 hours  albuterol/ipratropium for Nebulization 3 milliLiter(s) Nebulizer every 3 hours PRN  acetaminophen     Tablet .. 650 milliGRAM(s) Oral every 6 hours PRN  heparin   Injectable 5000 Unit(s) SubCutaneous every 8 hours  polyethylene glycol 3350 17 Gram(s) Oral daily  levothyroxine 75 MICROGram(s) Oral daily  methylPREDNISolone sodium succinate Injectable 60 milliGRAM(s) IV Push daily  nicotine -  14 mG/24Hr(s) Patch 1 Patch Transdermal daily      ICU Vital Signs Last 24 Hrs  T(C): 36.7 (24 Sep 2022 04:01), Max: 36.9 (23 Sep 2022 12:00)  T(F): 98 (24 Sep 2022 04:01), Max: 98.4 (23 Sep 2022 12:00)  HR: 72 (24 Sep 2022 04:51) (72 - 101)  BP: 153/89 (23 Sep 2022 22:00) (136/69 - 191/86)  BP(mean): 104 (23 Sep 2022 22:00) (88 - 115)  ABP: --  ABP(mean): --  RR: 17 (23 Sep 2022 22:00) (17 - 41)  SpO2: 99% (24 Sep 2022 04:51) (98% - 100%)    O2 Parameters below as of 23 Sep 2022 22:38  Patient On (Oxygen Delivery Method): room air                I&O's Detail    22 Sep 2022 07:01  -  23 Sep 2022 07:00  --------------------------------------------------------  IN:  Total IN: 0 mL    OUT:    Voided (mL): 2660 mL  Total OUT: 2660 mL    Total NET: -2660 mL      23 Sep 2022 07:01  -  24 Sep 2022 06:09  --------------------------------------------------------  IN:    Oral Fluid: 720 mL  Total IN: 720 mL    OUT:    Voided (mL): 1250 mL  Total OUT: 1250 mL    Total NET: -530 mL            LABS:                        11.4   3.80  )-----------( 228      ( 23 Sep 2022 06:31 )             35.1     09-23    134<L>  |  99  |  60<H>  ----------------------------<  147<H>  4.9   |  22  |  2.71<H>    Ca    9.8      23 Sep 2022 06:31  Phos  6.2       Mg     1.9         TPro  8.6<H>  /  Alb  3.8  /  TBili  0.5  /  DBili  x   /  AST  20  /  ALT  24  /  AlkPhos  49            CAPILLARY BLOOD GLUCOSE      POCT Blood Glucose.: 113 mg/dL (22 Sep 2022 08:51)      Urinalysis Basic - ( 22 Sep 2022 09:07 )    Color: Yellow / Appearance: Clear / S.005 / pH: x  Gluc: x / Ketone: Negative  / Bili: Negative / Urobili: Negative mg/dL   Blood: x / Protein: 15 mg/dL / Nitrite: Negative   Leuk Esterase: Negative / RBC: 0-2 /HPF / WBC 0-2   Sq Epi: x / Non Sq Epi: Negative / Bacteria: Negative      CULTURES:  Rapid RVP Result: NotDetec (22 @ 07:00)  Culture Results:   No growth to date. (22 @ 07:00)  Culture Results:   No growth to date. (22 @ 06:55)        Physical Examination:    General: No acute distress.  Alert, oriented, interactive, nonfocal    HEENT: Pupils equal, reactive to light.  Symmetric.    PULM: Clear to auscultation bilaterally, no significant sputum production    CVS: Regular rate and rhythm, no murmurs, rubs, or gallops    ABD: Soft, nondistended, nontender, normoactive bowel sounds, no masses    EXT: No edema, nontender    SKIN: Warm and well perfused, no rashes noted.    NEURO: A&Ox3, strength 5/5 all extremities, cranial nerves grossly intact, no focal deficits        RADIOLOGY:     < from: CT Abdomen and Pelvis No Cont (22 @ 08:42) >  FINDINGS:  CHEST:  LUNGS AND LARGE AIRWAYS: The central airways are patent. There is   interstitial and mild alveolar pulmonary edema. Previously seen left   upper lobe nodular opacity is not seen andmay be obscured by pulmonary   edema.  PLEURA: Trace bilateral pleural effusions.  VESSELS: Aortic atherosclerosis without aneurysm.  HEART: No cardiomegaly. No pericardial effusion. Coronary, mitral   annular, and aortic valve calcification.  MEDIASTINUM AND JAMIE: No adenopathy.  CHEST WALL AND LOWER NECK: No masses.    ABDOMEN AND PELVIS:  LIVER: Normal.  BILE DUCTS: Nondilated.  GALLBLADDER: Gallstones.  SPLEEN: Normal.  PANCREAS: Normal.  ADRENALS: Normal.  KIDNEYS/URETERS: No hydronephrosis or urinary tract calculi. Bilateral   extrarenal pelvis is again noted.    BLADDER: Cystocele at rest.  REPRODUCTIVE ORGANS: Stable 3.4 cm left adnexal mass since 2018. There is   evidence of pelvic organ descent.    BOWEL: No bowel-related abnormality. Specifically, no evidence of acute   diverticulitis. Normal appendix and ileocecal region. No bowel   obstruction or bowel inflammation. Mildly dilated fluid-filled esophagus.  PERITONEUM: No free air or ascites.  VESSELS: Aortoiliac atherosclerosis without aneurysm.  RETROPERITONEUM/LYMPH NODES: No adenopathy.  ABDOMINAL WALL: Normal.  BONES: No aggressive lesion.    IMPRESSION:  *  Pulmonary edema and trace bilateral pleural effusions.  *  No hydronephrosis or urinary tract calculi. Bilateralextrarenal   pelvis again noted.  *  Mildly dilated fluid-filled esophagus. Aspiration precautions are   advised.  *  Stable 3.4 cm left adnexal mass since 2018 remains indeterminate for   neoplasm.  *  Mild cystocele at rest. Further evaluation for pelvic floor   insufficiency is recommended.

## 2022-09-25 ENCOUNTER — TRANSCRIPTION ENCOUNTER (OUTPATIENT)
Age: 72
End: 2022-09-25

## 2022-09-25 LAB
ALBUMIN SERPL ELPH-MCNC: 4 G/DL — SIGNIFICANT CHANGE UP (ref 3.3–5)
ALP SERPL-CCNC: 47 U/L — SIGNIFICANT CHANGE UP (ref 30–120)
ALT FLD-CCNC: 24 U/L DA — SIGNIFICANT CHANGE UP (ref 10–60)
ANION GAP SERPL CALC-SCNC: 11 MMOL/L — SIGNIFICANT CHANGE UP (ref 5–17)
APPEARANCE UR: CLEAR — SIGNIFICANT CHANGE UP
AST SERPL-CCNC: 16 U/L — SIGNIFICANT CHANGE UP (ref 10–40)
BACTERIA # UR AUTO: ABNORMAL
BASOPHILS # BLD AUTO: 0.01 K/UL — SIGNIFICANT CHANGE UP (ref 0–0.2)
BASOPHILS NFR BLD AUTO: 0.1 % — SIGNIFICANT CHANGE UP (ref 0–2)
BILIRUB SERPL-MCNC: 0.4 MG/DL — SIGNIFICANT CHANGE UP (ref 0.2–1.2)
BILIRUB UR-MCNC: NEGATIVE — SIGNIFICANT CHANGE UP
BUN SERPL-MCNC: 89 MG/DL — HIGH (ref 7–23)
CALCIUM SERPL-MCNC: 9.4 MG/DL — SIGNIFICANT CHANGE UP (ref 8.4–10.5)
CALCIUM SERPL-MCNC: 9.5 MG/DL — SIGNIFICANT CHANGE UP (ref 8.4–10.5)
CHLORIDE SERPL-SCNC: 95 MMOL/L — LOW (ref 96–108)
CO2 SERPL-SCNC: 27 MMOL/L — SIGNIFICANT CHANGE UP (ref 22–31)
COLOR SPEC: YELLOW — SIGNIFICANT CHANGE UP
CREAT SERPL-MCNC: 2.81 MG/DL — HIGH (ref 0.5–1.3)
DIFF PNL FLD: ABNORMAL
EGFR: 17 ML/MIN/1.73M2 — LOW
EOSINOPHIL # BLD AUTO: 0.01 K/UL — SIGNIFICANT CHANGE UP (ref 0–0.5)
EOSINOPHIL NFR BLD AUTO: 0.1 % — SIGNIFICANT CHANGE UP (ref 0–6)
EPI CELLS # UR: SIGNIFICANT CHANGE UP
GLUCOSE SERPL-MCNC: 97 MG/DL — SIGNIFICANT CHANGE UP (ref 70–99)
GLUCOSE UR QL: NEGATIVE MG/DL — SIGNIFICANT CHANGE UP
HCT VFR BLD CALC: 35.2 % — SIGNIFICANT CHANGE UP (ref 34.5–45)
HGB BLD-MCNC: 11.6 G/DL — SIGNIFICANT CHANGE UP (ref 11.5–15.5)
IMM GRANULOCYTES NFR BLD AUTO: 0.5 % — SIGNIFICANT CHANGE UP (ref 0–0.9)
KETONES UR-MCNC: NEGATIVE — SIGNIFICANT CHANGE UP
LEUKOCYTE ESTERASE UR-ACNC: ABNORMAL
LYMPHOCYTES # BLD AUTO: 1.35 K/UL — SIGNIFICANT CHANGE UP (ref 1–3.3)
LYMPHOCYTES # BLD AUTO: 15.3 % — SIGNIFICANT CHANGE UP (ref 13–44)
MAGNESIUM SERPL-MCNC: 2.2 MG/DL — SIGNIFICANT CHANGE UP (ref 1.6–2.6)
MCHC RBC-ENTMCNC: 31.4 PG — SIGNIFICANT CHANGE UP (ref 27–34)
MCHC RBC-ENTMCNC: 33 GM/DL — SIGNIFICANT CHANGE UP (ref 32–36)
MCV RBC AUTO: 95.4 FL — SIGNIFICANT CHANGE UP (ref 80–100)
MONOCYTES # BLD AUTO: 0.72 K/UL — SIGNIFICANT CHANGE UP (ref 0–0.9)
MONOCYTES NFR BLD AUTO: 8.2 % — SIGNIFICANT CHANGE UP (ref 2–14)
NEUTROPHILS # BLD AUTO: 6.7 K/UL — SIGNIFICANT CHANGE UP (ref 1.8–7.4)
NEUTROPHILS NFR BLD AUTO: 75.8 % — SIGNIFICANT CHANGE UP (ref 43–77)
NITRITE UR-MCNC: NEGATIVE — SIGNIFICANT CHANGE UP
NRBC # BLD: 0 /100 WBCS — SIGNIFICANT CHANGE UP (ref 0–0)
PH UR: 5 — SIGNIFICANT CHANGE UP (ref 5–8)
PHOSPHATE SERPL-MCNC: 7.4 MG/DL — HIGH (ref 2.5–4.5)
PLATELET # BLD AUTO: 229 K/UL — SIGNIFICANT CHANGE UP (ref 150–400)
POTASSIUM SERPL-MCNC: 4.4 MMOL/L — SIGNIFICANT CHANGE UP (ref 3.5–5.3)
POTASSIUM SERPL-SCNC: 4.4 MMOL/L — SIGNIFICANT CHANGE UP (ref 3.5–5.3)
PROT ?TM UR-MCNC: 28 MG/DL — HIGH (ref 0–12)
PROT SERPL-MCNC: 8 G/DL — SIGNIFICANT CHANGE UP (ref 6–8.3)
PROT UR-MCNC: 15 MG/DL
PTH-INTACT FLD-MCNC: 162 PG/ML — HIGH (ref 15–65)
RBC # BLD: 3.69 M/UL — LOW (ref 3.8–5.2)
RBC # FLD: 14.4 % — SIGNIFICANT CHANGE UP (ref 10.3–14.5)
RBC CASTS # UR COMP ASSIST: SIGNIFICANT CHANGE UP /HPF (ref 0–4)
SODIUM SERPL-SCNC: 133 MMOL/L — LOW (ref 135–145)
SP GR SPEC: 1.01 — SIGNIFICANT CHANGE UP (ref 1.01–1.02)
UROBILINOGEN FLD QL: NEGATIVE MG/DL — SIGNIFICANT CHANGE UP
WBC # BLD: 8.83 K/UL — SIGNIFICANT CHANGE UP (ref 3.8–10.5)
WBC # FLD AUTO: 8.83 K/UL — SIGNIFICANT CHANGE UP (ref 3.8–10.5)
WBC UR QL: SIGNIFICANT CHANGE UP

## 2022-09-25 PROCEDURE — 99232 SBSQ HOSP IP/OBS MODERATE 35: CPT

## 2022-09-25 RX ADMIN — Medication 650 MILLIGRAM(S): at 22:00

## 2022-09-25 RX ADMIN — CARVEDILOL PHOSPHATE 12.5 MILLIGRAM(S): 80 CAPSULE, EXTENDED RELEASE ORAL at 17:57

## 2022-09-25 RX ADMIN — Medication 1 PATCH: at 19:43

## 2022-09-25 RX ADMIN — Medication 75 MICROGRAM(S): at 06:17

## 2022-09-25 RX ADMIN — Medication 1 PATCH: at 21:23

## 2022-09-25 RX ADMIN — AMLODIPINE BESYLATE 5 MILLIGRAM(S): 2.5 TABLET ORAL at 06:17

## 2022-09-25 RX ADMIN — POLYETHYLENE GLYCOL 3350 17 GRAM(S): 17 POWDER, FOR SOLUTION ORAL at 11:26

## 2022-09-25 RX ADMIN — Medication 650 MILLIGRAM(S): at 21:22

## 2022-09-25 RX ADMIN — CARVEDILOL PHOSPHATE 12.5 MILLIGRAM(S): 80 CAPSULE, EXTENDED RELEASE ORAL at 06:16

## 2022-09-25 RX ADMIN — Medication 10 MILLIGRAM(S): at 06:17

## 2022-09-25 RX ADMIN — HEPARIN SODIUM 5000 UNIT(S): 5000 INJECTION INTRAVENOUS; SUBCUTANEOUS at 14:06

## 2022-09-25 RX ADMIN — HEPARIN SODIUM 5000 UNIT(S): 5000 INJECTION INTRAVENOUS; SUBCUTANEOUS at 06:16

## 2022-09-25 RX ADMIN — Medication 40 MILLIGRAM(S): at 09:56

## 2022-09-25 RX ADMIN — Medication 1 PATCH: at 21:21

## 2022-09-25 RX ADMIN — Medication 1 PATCH: at 07:31

## 2022-09-25 RX ADMIN — Medication 3 MILLILITER(S): at 08:45

## 2022-09-25 RX ADMIN — HEPARIN SODIUM 5000 UNIT(S): 5000 INJECTION INTRAVENOUS; SUBCUTANEOUS at 21:22

## 2022-09-25 NOTE — PROGRESS NOTE ADULT - ASSESSMENT
The patient is a 72 year old female with a history of HTN, HL, hypothyroidism who presents with shortness of breath.    Plan:  - ECG with nonspecific findings but no evidence of ischemia or infarction  - Troponin mildly elevated at 192 in the setting of demand ischemia from heart failure, NED and respiratory failure. No need to trend further.  - CT chest with mild pulm edema and trace pleural effusions  - Echo with mildly reduced LV systolic function, mod MR, mod AR  - Diuretics on hold as per renal  - Hold spironolactone and ramipril due to NED  - Continue aspirin 81 mg daily  - Continue atorvastatin 20 mg daily (formulary equivalent)  - Continue carvedilol 12.5 mg bid  - Continue amlodipine 5 mg daily The patient is a 72 year old female with a history of HTN, HL, hypothyroidism who presents with shortness of breath.    Plan:  - ECG with nonspecific findings but no evidence of ischemia or infarction  - Troponin mildly elevated at 192 in the setting of demand ischemia from heart failure, NED and respiratory failure. No need to trend further.  - CT chest with mild pulm edema and trace pleural effusions  - Echo with mildly reduced LV systolic function, mod MR, mod AR  - Continue furosemide 40 mg PO daily  - Hold spironolactone and ramipril due to NED  - Continue aspirin 81 mg daily  - Continue atorvastatin 20 mg daily (formulary equivalent)  - Continue carvedilol 12.5 mg bid  - Continue amlodipine 5 mg daily

## 2022-09-25 NOTE — PROGRESS NOTE ADULT - SUBJECTIVE AND OBJECTIVE BOX
Date/Time Patient Seen:  		  Referring MD:   Data Reviewed	       Patient is a 72y old  Female who presents with a chief complaint of SOB (24 Sep 2022 10:02)      Subjective/HPI     PAST MEDICAL & SURGICAL HISTORY:  COPD exacerbation          Medication list         MEDICATIONS  (STANDING):  albuterol/ipratropium for Nebulization 3 milliLiter(s) Nebulizer every 8 hours  amLODIPine   Tablet 5 milliGRAM(s) Oral daily  carvedilol 12.5 milliGRAM(s) Oral every 12 hours  furosemide    Tablet 40 milliGRAM(s) Oral daily  heparin   Injectable 5000 Unit(s) SubCutaneous every 8 hours  levothyroxine 75 MICROGram(s) Oral daily  nicotine -  14 mG/24Hr(s) Patch 1 Patch Transdermal daily  polyethylene glycol 3350 17 Gram(s) Oral daily  predniSONE   Tablet 10 milliGRAM(s) Oral daily  senna 2 Tablet(s) Oral at bedtime    MEDICATIONS  (PRN):  acetaminophen     Tablet .. 650 milliGRAM(s) Oral every 6 hours PRN Temp greater or equal to 38C (100.4F), Mild Pain (1 - 3)  albuterol/ipratropium for Nebulization 3 milliLiter(s) Nebulizer every 3 hours PRN Shortness of Breath and/or Wheezing  labetalol Injectable 10 milliGRAM(s) IV Push every 6 hours PRN Systolic blood pressure > 180  nitroglycerin     SubLingual 0.4 milliGRAM(s) SubLingual every 5 minutes PRN Chest Pain         Vitals log        ICU Vital Signs Last 24 Hrs  T(C): 36.5 (25 Sep 2022 05:21), Max: 36.9 (24 Sep 2022 08:00)  T(F): 97.7 (25 Sep 2022 05:21), Max: 98.5 (24 Sep 2022 08:00)  HR: 76 (25 Sep 2022 06:00) (60 - 102)  BP: 162/74 (25 Sep 2022 06:00) (113/57 - 183/97)  BP(mean): 97 (25 Sep 2022 06:00) (74 - 119)  ABP: --  ABP(mean): --  RR: 20 (25 Sep 2022 06:00) (15 - 23)  SpO2: 100% (25 Sep 2022 06:00) (98% - 100%)    O2 Parameters below as of 25 Sep 2022 06:00  Patient On (Oxygen Delivery Method): room air                 Input and Output:  I&O's Detail    23 Sep 2022 07:01  -  24 Sep 2022 07:00  --------------------------------------------------------  IN:    Oral Fluid: 1200 mL  Total IN: 1200 mL    OUT:    Voided (mL): 1450 mL  Total OUT: 1450 mL    Total NET: -250 mL      24 Sep 2022 07:01  -  25 Sep 2022 06:48  --------------------------------------------------------  IN:    Oral Fluid: 420 mL  Total IN: 420 mL    OUT:    Blood Loss (mL): 300 mL    Voided (mL): 1100 mL  Total OUT: 1400 mL    Total NET: -980 mL          Lab Data                        11.9   9.03  )-----------( 254      ( 24 Sep 2022 08:24 )             35.3     09-24    132<L>  |  95<L>  |  77<H>  ----------------------------<  130<H>  4.5   |  26  |  3.05<H>    Ca    10.1      24 Sep 2022 08:24              Review of Systems	      Objective     Physical Examination    heart s1s2  lung dec BS  head nc      Pertinent Lab findings & Imaging      Tish:  NO   Adequate UO     I&O's Detail    23 Sep 2022 07:01  -  24 Sep 2022 07:00  --------------------------------------------------------  IN:    Oral Fluid: 1200 mL  Total IN: 1200 mL    OUT:    Voided (mL): 1450 mL  Total OUT: 1450 mL    Total NET: -250 mL      24 Sep 2022 07:01  -  25 Sep 2022 06:48  --------------------------------------------------------  IN:    Oral Fluid: 420 mL  Total IN: 420 mL    OUT:    Blood Loss (mL): 300 mL    Voided (mL): 1100 mL  Total OUT: 1400 mL    Total NET: -980 mL               Discussed with:     Cultures:	        Radiology

## 2022-09-25 NOTE — DISCHARGE NOTE NURSING/CASE MANAGEMENT/SOCIAL WORK - NSDCPEFALRISK_GEN_ALL_CORE
For information on Fall & Injury Prevention, visit: [email or web address] OR  [email or web address] OR  https://www.cdc.gov/steadi/patient.html

## 2022-09-25 NOTE — PROGRESS NOTE ADULT - SUBJECTIVE AND OBJECTIVE BOX
Subjective: no dyspnea today. Cr stable at ~2.8.       MEDICATIONS  (STANDING):  albuterol/ipratropium for Nebulization 3 milliLiter(s) Nebulizer every 8 hours  amLODIPine   Tablet 5 milliGRAM(s) Oral daily  carvedilol 12.5 milliGRAM(s) Oral every 12 hours  furosemide    Tablet 40 milliGRAM(s) Oral daily  heparin   Injectable 5000 Unit(s) SubCutaneous every 8 hours  levothyroxine 75 MICROGram(s) Oral daily  nicotine -  14 mG/24Hr(s) Patch 1 Patch Transdermal daily  polyethylene glycol 3350 17 Gram(s) Oral daily  predniSONE   Tablet 10 milliGRAM(s) Oral daily  senna 2 Tablet(s) Oral at bedtime    MEDICATIONS  (PRN):  acetaminophen     Tablet .. 650 milliGRAM(s) Oral every 6 hours PRN Temp greater or equal to 38C (100.4F), Mild Pain (1 - 3)  albuterol/ipratropium for Nebulization 3 milliLiter(s) Nebulizer every 3 hours PRN Shortness of Breath and/or Wheezing  labetalol Injectable 10 milliGRAM(s) IV Push every 6 hours PRN Systolic blood pressure > 180  nitroglycerin     SubLingual 0.4 milliGRAM(s) SubLingual every 5 minutes PRN Chest Pain          T(C): 36.6 (09-25-22 @ 12:35), Max: 36.9 (09-24-22 @ 16:00)  HR: 71 (09-25-22 @ 12:00) (60 - 102)  BP: 138/76 (09-25-22 @ 12:00) (113/57 - 175/82)  RR: 16 (09-25-22 @ 12:00) (15 - 23)  SpO2: 100% (09-25-22 @ 12:00) (98% - 100%)  Wt(kg): --        I&O's Detail    24 Sep 2022 07:01  -  25 Sep 2022 07:00  --------------------------------------------------------  IN:    Oral Fluid: 420 mL  Total IN: 420 mL    OUT:    Blood Loss (mL): 300 mL    Voided (mL): 1100 mL  Total OUT: 1400 mL    Total NET: -980 mL      25 Sep 2022 07:01  -  25 Sep 2022 13:53  --------------------------------------------------------  IN:  Total IN: 0 mL    OUT:    Voided (mL): 200 mL  Total OUT: 200 mL    Total NET: -200 mL               PHYSICAL EXAM:    GENERAL: NAD  NECK: Supple, no inc in JVP  CHEST/LUNG: no crackles  HEART: S1S2  ABDOMEN: Soft, Nontender, Nondistended; Bowel sounds present  EXTREMITIES:  no edema      LABS:  CBC Full  -  ( 25 Sep 2022 06:59 )  WBC Count : 8.83 K/uL  RBC Count : 3.69 M/uL  Hemoglobin : 11.6 g/dL  Hematocrit : 35.2 %  Platelet Count - Automated : 229 K/uL  Mean Cell Volume : 95.4 fl  Mean Cell Hemoglobin : 31.4 pg  Mean Cell Hemoglobin Concentration : 33.0 gm/dL  Auto Neutrophil # : 6.70 K/uL  Auto Lymphocyte # : 1.35 K/uL  Auto Monocyte # : 0.72 K/uL  Auto Eosinophil # : 0.01 K/uL  Auto Basophil # : 0.01 K/uL  Auto Neutrophil % : 75.8 %  Auto Lymphocyte % : 15.3 %  Auto Monocyte % : 8.2 %  Auto Eosinophil % : 0.1 %  Auto Basophil % : 0.1 %    09-25    133<L>  |  95<L>  |  89<H>  ----------------------------<  97  4.4   |  27  |  2.81<H>    Ca    9.4      25 Sep 2022 06:59  Phos  7.4     09-25  Mg     2.2     09-25    TPro  8.0  /  Alb  4.0  /  TBili  0.4  /  DBili  x   /  AST  16  /  ALT  24  /  AlkPhos  47  09-25      Impression:  * NED -- suspect cardio-renal dysregulation. DDx; critical b/l renal artery dz, LN ( bland urine makes it less likely )  * HyperK -- NED, ACE-I + MRA effect. Improved.   * Acute pulm edema. ? due to valvular dz vs b/l SHILA    Recommendations:   * Keep off diuretic as she is euvolemic  * Cont to watch off Aldactone, ACE-I  * RA doppler is not available in this institution.   * CTA of RA is relatively contraindicated due to severe NED, high risk of contrast ATN  * Check Angelo, Renin  * Repeat UA, quantify proteinuria.   * May require renal bx if Cr fails to improve quickly. This can be arranged as outpt.     Thank you

## 2022-09-25 NOTE — DISCHARGE NOTE NURSING/CASE MANAGEMENT/SOCIAL WORK - NSSCNAMETXT_GEN_ALL_CORE
[facility] - [phone] or [phone]  Nurse to visit the day after hospital discharge. Please contact the home care agency if you have not heard from them by 12 noon on the day after your hospital discharge.

## 2022-09-25 NOTE — DISCHARGE NOTE NURSING/CASE MANAGEMENT/SOCIAL WORK - PATIENT PORTAL LINK FT
You can access the FollowMyHealth Patient Portal offered by [facility] by registering at the following website: [email or web address]. By joining [facility]’s FollowMyHealth portal, you will also be able to view your health information using other applications (apps) compatible with our system.

## 2022-09-25 NOTE — PROGRESS NOTE ADULT - ASSESSMENT
72 y.o. F presents by EMS c/o not feeling well, initial call to EMS was for difficulty breathing, at home family mentioned possible facial droop, pt in significant respiratory distress on presentation to ED    resp distress  CHF  pleural eff  pulm edema  COPD  OP  OA  Ex Smoker  hx of Pulm Nodules    rheum eval noted  in light of pt's rheum conditions - and risk of renal compromise - systemic steroids reduced in dose  I and O  cardio follow up  spcu monitoring  lasix diuresis in progress - transitioned to PO    diuresis - cvs rx regimen optimization - cardio eval   I and O  serial labs  replete lytes  monitor VS and HD  card tele and pulse ox monitoring  VBG  NIPPV - prn use - o2 support - wean fio2 as tolerated - keep sat > 88 pct  COPD - Bronchodilators and short course of Systemic Steroids - low dose  CT imaging reviewed - esoph dilation - effusions - atelectasis  effusions - no indication for thoracentesis at present - med rx regimen - card rx   I anastasia as able to cooperate  asp prec  HOB elev  Serial Renal Indices -   GOC discussion

## 2022-09-25 NOTE — PROGRESS NOTE ADULT - SUBJECTIVE AND OBJECTIVE BOX
Chief Complaint: Shortness of breath    Interval Events: No events overnight.    Review of Systems:  General: No fevers, chills, weight gain  Skin: No rashes, color changes  Cardiovascular: No chest pain, orthopnea  Respiratory: No shortness of breath, cough  Gastrointestinal: No nausea, abdominal pain  Genitourinary: No incontinence, pain with urination  Musculoskeletal: No pain, swelling, decreased range of motion  Neurological: No headache, weakness  Psychiatric: No depression, anxiety  Endocrine: No weight gain, increased thirst  All other systems are comprehensively negative.    Physical Exam:  Vital Signs Last 24 Hrs  T(C): 36.7 (25 Sep 2022 08:00), Max: 36.9 (24 Sep 2022 16:00)  T(F): 98.1 (25 Sep 2022 08:00), Max: 98.4 (24 Sep 2022 16:00)  HR: 85 (25 Sep 2022 09:14) (60 - 102)  BP: 149/69 (25 Sep 2022 08:00) (113/57 - 175/82)  BP(mean): 91 (25 Sep 2022 08:00) (74 - 112)  RR: 21 (25 Sep 2022 08:00) (15 - 23)  SpO2: 98% (25 Sep 2022 09:14) (98% - 100%)    Parameters below as of 25 Sep 2022 09:14  Patient On (Oxygen Delivery Method): room air      General: NAD  HEENT: MMM  Neck: No JVD, no carotid bruit  Lungs: CTAB  CV: RRR, nl S1/S2, no M/R/G  Abdomen: S/NT/ND, +BS  Extremities: No LE edema, no cyanosis  Neuro: AAOx3, non-focal  Skin: No rash    Labs:    09-25    133<L>  |  95<L>  |  89<H>  ----------------------------<  97  4.4   |  27  |  2.81<H>    Ca    9.4      25 Sep 2022 06:59  Phos  7.4     09-25  Mg     2.2     09-25    TPro  8.0  /  Alb  4.0  /  TBili  0.4  /  DBili  x   /  AST  16  /  ALT  24  /  AlkPhos  47  09-25                        11.6   8.83  )-----------( 229      ( 25 Sep 2022 06:59 )             35.2       Telemetry: Sinus rhythm

## 2022-09-26 ENCOUNTER — TRANSCRIPTION ENCOUNTER (OUTPATIENT)
Age: 72
End: 2022-09-26

## 2022-09-26 LAB
ALDOST SERPL-MCNC: 70.4 NG/DL — HIGH
ANION GAP SERPL CALC-SCNC: 10 MMOL/L — SIGNIFICANT CHANGE UP (ref 5–17)
BUN SERPL-MCNC: 96 MG/DL — HIGH (ref 7–23)
CA-I BLD-SCNC: 4.7 MG/DL — SIGNIFICANT CHANGE UP (ref 4.5–5.6)
CALCIUM SERPL-MCNC: 9.4 MG/DL — SIGNIFICANT CHANGE UP (ref 8.4–10.5)
CHLORIDE SERPL-SCNC: 98 MMOL/L — SIGNIFICANT CHANGE UP (ref 96–108)
CO2 SERPL-SCNC: 27 MMOL/L — SIGNIFICANT CHANGE UP (ref 22–31)
CREAT ?TM UR-MCNC: 31 MG/DL — SIGNIFICANT CHANGE UP
CREAT SERPL-MCNC: 2.79 MG/DL — HIGH (ref 0.5–1.3)
EGFR: 17 ML/MIN/1.73M2 — LOW
GLUCOSE SERPL-MCNC: 86 MG/DL — SIGNIFICANT CHANGE UP (ref 70–99)
IGA FLD-MCNC: 290 MG/DL — SIGNIFICANT CHANGE UP (ref 84–499)
IGG FLD-MCNC: 1834 MG/DL — HIGH (ref 610–1660)
IGM SERPL-MCNC: 26 MG/DL — LOW (ref 35–242)
KAPPA LC SER QL IFE: 12.19 MG/DL — HIGH (ref 0.33–1.94)
KAPPA/LAMBDA FREE LIGHT CHAIN RATIO, SERUM: 2.34 RATIO — HIGH (ref 0.26–1.65)
LAMBDA LC SER QL IFE: 5.22 MG/DL — HIGH (ref 0.57–2.63)
POTASSIUM SERPL-MCNC: 4.4 MMOL/L — SIGNIFICANT CHANGE UP (ref 3.5–5.3)
POTASSIUM SERPL-SCNC: 4.4 MMOL/L — SIGNIFICANT CHANGE UP (ref 3.5–5.3)
PROT ?TM UR-MCNC: 22 MG/DL — HIGH (ref 0–12)
PROT/CREAT UR-RTO: 0.7 RATIO — HIGH (ref 0–0.2)
SODIUM SERPL-SCNC: 135 MMOL/L — SIGNIFICANT CHANGE UP (ref 135–145)

## 2022-09-26 PROCEDURE — 99232 SBSQ HOSP IP/OBS MODERATE 35: CPT

## 2022-09-26 RX ORDER — RAMIPRIL 5 MG
1 CAPSULE ORAL
Qty: 0 | Refills: 0 | DISCHARGE

## 2022-09-26 RX ORDER — VERAPAMIL HCL 240 MG
1 CAPSULE, EXTENDED RELEASE PELLETS 24 HR ORAL
Qty: 0 | Refills: 0 | DISCHARGE

## 2022-09-26 RX ORDER — CARVEDILOL PHOSPHATE 80 MG/1
1 CAPSULE, EXTENDED RELEASE ORAL
Qty: 0 | Refills: 0 | DISCHARGE
Start: 2022-09-26

## 2022-09-26 RX ORDER — LEVOTHYROXINE SODIUM 125 MCG
1 TABLET ORAL
Qty: 0 | Refills: 0 | DISCHARGE

## 2022-09-26 RX ORDER — FUROSEMIDE 40 MG
1 TABLET ORAL
Qty: 0 | Refills: 0 | DISCHARGE
Start: 2022-09-26

## 2022-09-26 RX ORDER — SPIRONOLACTONE 25 MG/1
1 TABLET, FILM COATED ORAL
Qty: 0 | Refills: 0 | DISCHARGE

## 2022-09-26 RX ORDER — CAPTOPRIL 12.5 MG/1
1 TABLET ORAL
Qty: 0 | Refills: 0 | DISCHARGE
Start: 2022-09-26

## 2022-09-26 RX ORDER — LEVOTHYROXINE SODIUM 125 MCG
1 TABLET ORAL
Qty: 0 | Refills: 0 | DISCHARGE
Start: 2022-09-26

## 2022-09-26 RX ORDER — AMLODIPINE BESYLATE 2.5 MG/1
1 TABLET ORAL
Qty: 0 | Refills: 0 | DISCHARGE
Start: 2022-09-26

## 2022-09-26 RX ORDER — CAPTOPRIL 12.5 MG/1
12.5 TABLET ORAL DAILY
Refills: 0 | Status: DISCONTINUED | OUTPATIENT
Start: 2022-09-26 | End: 2022-09-27

## 2022-09-26 RX ADMIN — HEPARIN SODIUM 5000 UNIT(S): 5000 INJECTION INTRAVENOUS; SUBCUTANEOUS at 14:34

## 2022-09-26 RX ADMIN — Medication 3 MILLILITER(S): at 07:28

## 2022-09-26 RX ADMIN — Medication 1 PATCH: at 21:27

## 2022-09-26 RX ADMIN — Medication 75 MICROGRAM(S): at 06:17

## 2022-09-26 RX ADMIN — Medication 1 PATCH: at 21:49

## 2022-09-26 RX ADMIN — CAPTOPRIL 12.5 MILLIGRAM(S): 12.5 TABLET ORAL at 14:35

## 2022-09-26 RX ADMIN — CARVEDILOL PHOSPHATE 12.5 MILLIGRAM(S): 80 CAPSULE, EXTENDED RELEASE ORAL at 17:22

## 2022-09-26 RX ADMIN — Medication 1 PATCH: at 19:00

## 2022-09-26 RX ADMIN — CARVEDILOL PHOSPHATE 12.5 MILLIGRAM(S): 80 CAPSULE, EXTENDED RELEASE ORAL at 06:17

## 2022-09-26 RX ADMIN — Medication 1 PATCH: at 08:04

## 2022-09-26 RX ADMIN — HEPARIN SODIUM 5000 UNIT(S): 5000 INJECTION INTRAVENOUS; SUBCUTANEOUS at 06:18

## 2022-09-26 RX ADMIN — Medication 3 MILLILITER(S): at 15:25

## 2022-09-26 RX ADMIN — Medication 3 MILLILITER(S): at 21:05

## 2022-09-26 RX ADMIN — AMLODIPINE BESYLATE 5 MILLIGRAM(S): 2.5 TABLET ORAL at 06:17

## 2022-09-26 RX ADMIN — Medication 650 MILLIGRAM(S): at 15:25

## 2022-09-26 RX ADMIN — HEPARIN SODIUM 5000 UNIT(S): 5000 INJECTION INTRAVENOUS; SUBCUTANEOUS at 21:27

## 2022-09-26 RX ADMIN — Medication 10 MILLIGRAM(S): at 06:17

## 2022-09-26 RX ADMIN — Medication 650 MILLIGRAM(S): at 14:34

## 2022-09-26 NOTE — DISCHARGE NOTE PROVIDER - NSDCCPTREATMENT_GEN_ALL_CORE_FT
PRINCIPAL PROCEDURE  Procedure: US guided biopsy of kidney  Findings and Treatment:        PRINCIPAL PROCEDURE  Procedure: Duplex ultrasound of kidney  Findings and Treatment: INTERPRETATION:  CLINICAL INFORMATION: Hypertension, chronic renal   disease; history of scleroderma  COMPARISON: 9/22/2022  TECHNIQUE: Colorand spectral Doppler evaluation of the kidneys.  FINDINGS:  Right kidney: 10.0 cm. Increased echogenicity. No renal mass,   hydronephrosis or calculi. Extrarenal pelvis.  Left kidney: 10.5 cm. Increased echogenicity. No renal mass,   hydronephrosisor calculi.  Urinary bladder: Underdistended.  Color and spectral Doppler reveals normal, symmetric blood flow   throughout both kidneys.  Peak aortic velocity is 45 cm/sec.  IVC/Renal Veins: Patent.  RIGHT  Renal Artery:  Peak systolic velocity is 131 cm/sec origin, 149 cm/sec proximal, 92   cm/sec mid, 41 cm/sec distal and 70 cm/sec hilum.  Upper Segmental Artery:  RI = 0.85  Middle Segmental Artery: RI = 0.83  Lower Segmental Artery: RI = 0.88  LEFT  Renal Artery:  Peak systolic velocity is 64 cm/sec origin, 63 cm/sec proximal, 146   cm/sec mid, 64 cm/sec distal and 68 cm/sec hilum.  Upper Segmental Artery:  RI = 0.87  Middle Segmental Artery: RI = 0.84  Lower Segmental Artery: RI = 0.75  IMPRESSION:  No evidence of a significant renal artery stenosis.  Bilaterally increased renal cortical echogenicity compatible with medical   renal disease.  No hydronephrosis.      SECONDARY PROCEDURE  Procedure: US guided biopsy of kidney  Findings and Treatment:

## 2022-09-26 NOTE — DISCHARGE NOTE PROVIDER - ATTENDING DISCHARGE PHYSICAL EXAMINATION:
72F with hx of SLE, MCTD with AHRF with acute on chronic renal disease here for further evaluation of acute kidney injury. Ddx includes scleroderma renal crisis, less likely lupus nephritis. Complement levels wnl. Cr improving. Complicated with ep of hypotension at OSH when captopril started. On this admission, patient persistently hypertensive despite increasing captopril, asymptomatic. Renal biopsy cancelled due to uncontrolled HTN with component of anxiety.     Patient seen, resting in her chair this morning sleeping. Unfortunately, renal biopsy cancelled 3rd time due to HTN to SBP 170s s/p ativan. Discharge home with outpatient bone marrow and renal biopsy. Discharge home on felodpine 5mg and captopril 37.5mg TID.

## 2022-09-26 NOTE — DISCHARGE NOTE PROVIDER - CARE PROVIDER_API CALL
Calos Shay (DO)  Internal Medicine  [address] [id number]  Phone: [phone]  Fax: [phone]  Follow Up Time:    Calos Shay ()  Internal Medicine  [address] [place] [address]  Phone: [phone]  Fax: [phone]  Follow Up Time: 2 weeks    TUCKER CASTILLO  Rheumatology  [facility] [place] [address]  Phone: [phone]  Fax: [phone]  Follow Up Time: 2 weeks    Etta Dunlap)  Medicine  [address] [place] [address], [address] [id number]  Phone: [phone]  Fax: [phone]  Follow Up Time: 2 weeks

## 2022-09-26 NOTE — DISCHARGE NOTE PROVIDER - CARE PROVIDERS DIRECT ADDRESSES
,DirectAddress_Unknown ,DirectAddress_Unknown,DirectAddress_Unknown,[email or web address]

## 2022-09-26 NOTE — PROGRESS NOTE ADULT - ASSESSMENT
72 y.o. F presents by EMS c/o not feeling well, initial call to EMS was for difficulty breathing, at home family mentioned possible facial droop, pt in significant respiratory distress on presentation to ED    resp distress  CHF  pleural eff  pulm edema  COPD  OP  OA  Ex Smoker  hx of Pulm Nodules    rheum eval noted  in light of pt's rheum conditions - and risk of renal compromise - systemic steroids reduced in dose  PO LASIX  VS noted  UA noted - cx pending    diuresis - cvs rx regimen optimization - cardio eval   I and O  serial labs  replete lytes  monitor VS and HD  card tele and pulse ox monitoring  VBG  NIPPV - prn use - o2 support - wean fio2 as tolerated - keep sat > 88 pct  COPD - Bronchodilators and short course of Systemic Steroids - low dose  CT imaging reviewed - esoph dilation - effusions - atelectasis  effusions - no indication for thoracentesis at present - med rx regimen - card rx   I anastasia as able to cooperate  asp prec  HOB elev  Serial Renal Indices -   GOC discussion

## 2022-09-26 NOTE — CHART NOTE - NSCHARTNOTEFT_GEN_A_CORE
Assessment: Per H&P, pt is a "72F CAD, Lupus, RA, Hypothyroidism, HTN, CKD3, and recent COVID in June 2022 comes in today complaining of SOB on exertion.   Patient was in usual state of health up until few days prior when she started to notice progressively worsening SOB.  Denies any chest pain, or orthopnea. No recent illness or sick contacts. No changes in medications.  CT Chest shows pulmonary edema and lasix given. Patient admitted for further management."    9/26  Pt due for nutrition follow up.  During time of initial nutrition assessment, pt reported decreased appetite and intake, some nausea and dry heaving.  Pt continues of regular diet, reports small improvement in appetite and intake, occasional nausea at times but consuming ~50% of most meals.  Reports she primarily does food preparation at home - was eating well up until she was diagnosed with COVID in June 2022.  Since then she reports significant decrease in appetite and intake, reporting occasionally not eating anything.  Usually breakfast is toast, coffee, lunch is eggs, meat, dinner is similar with addition of vegetables and boiled potatoes.  Portions are considerably smaller compared to baseline.  Pt also endorses vomiting episodes since ~June/July, ~4-5x/wk- usually at 1am.  Pt reports assocated weight loss secondary to poor po intake x few months.  Per pt/spouse, weighed 155# in June and CBW ~140# (adm wt 138#), indicating 17#/10.9% x ~3 months (clinically significant).  Based on </75% of estimated energy requirement for >/1 month and 10.9% wt loss x 3 months, pt previously met clinical criteria of severe protein calorie malnutrition in context of chronic illness.  Renal following for acute renal failure on CKD, hyperkalemic on adm (now on WNL), hyperphosphatemic- suspect >phos is not related to dietary intake as po intake is suboptimal; noted renal recommending d/c diuretic as pt euvolemic; pt on lasix which may cause hyperphosphatemia.  Recommend repeat serum phos level.  PO intake of meals/fluids encouraged as tolerated; denies interest in ONS.  RD to follow closely and will continue to monitor pt's nutrition status.    Factors impacting intake: [ ] none [ x] nausea  [ ] vomiting [ ] diarrhea [x ] constipation  [ ]chewing problems [ ] swallowing issues  [ ] other:     Diet Prescription: Diet, Regular (09-22-22 @ 17:07)    Intake: ~50% of meals, fair-good of fluids    Current Weight: Weight (kg): 62.6 (09-22 @ 13:30)  % Weight Change  adm wt 138#  9/26 131.3#    Pertinent Medications: MEDICATIONS  (STANDING):  albuterol/ipratropium for Nebulization 3 milliLiter(s) Nebulizer every 8 hours  amLODIPine   Tablet 5 milliGRAM(s) Oral daily  captopril 12.5 milliGRAM(s) Oral daily  carvedilol 12.5 milliGRAM(s) Oral every 12 hours  furosemide    Tablet 40 milliGRAM(s) Oral daily  heparin   Injectable 5000 Unit(s) SubCutaneous every 8 hours  levothyroxine 75 MICROGram(s) Oral daily  nicotine -  14 mG/24Hr(s) Patch 1 Patch Transdermal daily  polyethylene glycol 3350 17 Gram(s) Oral daily  predniSONE   Tablet 10 milliGRAM(s) Oral daily  senna 2 Tablet(s) Oral at bedtime    MEDICATIONS  (PRN):  acetaminophen     Tablet .. 650 milliGRAM(s) Oral every 6 hours PRN Temp greater or equal to 38C (100.4F), Mild Pain (1 - 3)  albuterol/ipratropium for Nebulization 3 milliLiter(s) Nebulizer every 3 hours PRN Shortness of Breath and/or Wheezing  labetalol Injectable 10 milliGRAM(s) IV Push every 6 hours PRN Systolic blood pressure > 180  nitroglycerin     SubLingual 0.4 milliGRAM(s) SubLingual every 5 minutes PRN Chest Pain    Pertinent Labs: 09-26 Na135 mmol/L Glu 86 mg/dL K+ 4.4 mmol/L Cr  2.79 mg/dL<H> BUN 96 mg/dL<H> 09-25 Phos 7.4 mg/dL<H> 09-25 Alb 4.0 g/dL     CAPILLARY BLOOD GLUCOSE        Skin: no pressure ulcer    Estimated Needs:   [ x] no change since previous assessment  [ ] recalculated:     Previous Nutrition Diagnosis:   [ ] Inadequate Energy Intake [ ]Inadequate Oral Intake [ ] Excessive Energy Intake   [ ] Underweight [ ] Increased Nutrient Needs [ ] Overweight/Obesity   [ ] Altered GI Function [ ] Unintended Weight Loss [ ] Food & Nutrition Related Knowledge Deficit [x ] Malnutrition     Nutrition Diagnosis is [x ] ongoing  [ ] resolved [ ] not applicable     New Nutrition Diagnosis: [ ] not applicable       Interventions: regular diet   Recommend  [ ] Change Diet To:  [ ] Nutrition Supplement  [ ] Nutrition Support  [x ] Other: repeat serum phos    Monitoring and Evaluation:   [x ] PO intake [ x ] Tolerance to diet prescription [ x ] weights [ x ] labs[ x ] follow up per protocol  [ ] other:

## 2022-09-26 NOTE — DISCHARGE NOTE PROVIDER - NSFOLLOWUPCLINICS_GEN_ALL_ED_FT
[facility]  Hematology/Oncology  [address] [id number]  Phone: [phone]  Fax:   Follow Up Time: 2 weeks

## 2022-09-26 NOTE — DISCHARGE NOTE PROVIDER - HOSPITAL COURSE
72F CAD, Lupus, Hypothyroidism, HTN, CKD3, and recent COVID in June 2022 admitted for Acute Hypoxic Respiratory Failure.     Acute Hypoxic Respiratory Failure  Resolved; CT Chest reviewed. Has Pulmonary Edema and history of CAD; Echo with diastolic Dysfunction and EF 45-50%  Given history of MCTD will need to be concerned for ILD and PAH  Lasix IV Daily and monitor Urine output; Supplemental O2 PRN   Pulmonary and Cardiology Consultation noted   diuretic holiday per nephro, resume PO lasix tomorrow    Acute Renal Failure on CKD 3   Creatinine has not improved; High Suspicion for Scleroderma Renal Crisis at this point  Captopril started for HTN; Have consulted with Nephrology  Possible need for high dose steroids; May need sooner than later renal biopsy  Will pursue transfer to [facility] due to no Rheumatology support here    MCTD   Confirmed with her Rheumatologist that she has features of Scleroderma and SLE   Labs and exam concerning for Scleroderma Renal Crisis   Currently we are giving her non pulse dose steroids - reduced to 10 mg daily    CAD / HTN   Patient BP uncontrolled; History of PCI  Lasix and Hydralazine; Hold ACE I but may resume after discussion with Rheum and Nephro  BP continues to remain uncontrolled, increase carvedilol to 12.5 mg BID  cont Amlodipine 5, labetalol IV PRN    Hypothyroidism  Follow up TSH and Free T4  Synthroid increased    Diet  Regular    Disposition   Discharge to [facility] for      72F CAD, Lupus, MCTD, Raynauds, Hypothyroidism, HTN, CKD3, and recent COVID in June 2022 admitted to [facility] for acute hypoxemic respiratory failure with worsening DEMPSEY, required bipap initially during that admission and was later de-escalated to RA. She has a hx of lupus and was being followed by a rheumatologist as outpatient. At [place], she was evaluated by rheum and diagnosed with limited scleroderma with consideration for scleroderma renal crisis. Renal was consulted for eval of NED with concern for b/l renal artery disease vs scleroderma renal crisis. She was transferred to [facility] for further evaluation, as OSH did not have renal duplex US capabilities. Extensive nephrology workup was performed.  Renal duplex US was negative for SHILA, leaving differential of scleroderma renal crisis vs. Lupus nephritis. Patient was restarted on captopril, which in scleroderma renal crisis shows significant improvement in BPs (which patient also demonstrated), however needed to reduce dose because she had been given a significantly higher dose at OSH and was relatively hypotensive. Kidney biopsy was performed by Interventional Radiology which demonstrated ____________.     Of note, patient has been mildly hyponatremic, urine studies were sent and demonstrated ____________        Acute Hypoxic Respiratory Failure  Resolved; CT Chest reviewed. Has Pulmonary Edema and history of CAD; Echo with diastolic Dysfunction and EF 45-50%  Given history of MCTD will need to be concerned for ILD and PAH  Lasix IV Daily and monitor Urine output; Supplemental O2 PRN   Pulmonary and Cardiology Consultation noted   diuretic holiday per nephro, resume PO lasix tomorrow    Acute Renal Failure on CKD 3   Creatinine has not improved; High Suspicion for Scleroderma Renal Crisis at this point  Captopril started for HTN; Have consulted with Nephrology  Possible need for high dose steroids; May need sooner than later renal biopsy  Will pursue transfer to [facility] due to no Rheumatology support here    MCTD   Confirmed with her Rheumatologist that she has features of Scleroderma and SLE   Labs and exam concerning for Scleroderma Renal Crisis   Currently we are giving her non pulse dose steroids - reduced to 10 mg daily    CAD / HTN   Patient BP uncontrolled; History of PCI  Lasix and Hydralazine; Hold ACE I but may resume after discussion with Rheum and Nephro  BP continues to remain uncontrolled, increase carvedilol to 12.5 mg BID  cont Amlodipine 5, labetalol IV PRN    Hypothyroidism  Follow up TSH and Free T4  Synthroid increased    Diet  Regular    Disposition   Discharge to [facility] for      72F CAD, Lupus, MCTD, Raynauds, Hypothyroidism, HTN, CKD3, and recent COVID in June 2022 admitted to [facility] for acute hypoxemic respiratory failure with worsening DEMPSEY, required bipap initially during that admission and was later de-escalated to RA. She has a hx of lupus and was being followed by a rheumatologist as outpatient. At [place], she was evaluated by rheum and diagnosed with limited scleroderma with consideration for scleroderma renal crisis. Renal was consulted for eval of NED with concern for b/l renal artery disease vs scleroderma renal crisis. She was transferred to [facility] for further evaluation, as OSH did not have renal duplex US capabilities. Extensive nephrology workup was performed.  Renal duplex US was negative for SHILA, leaving differential of scleroderma renal crisis vs. Lupus nephritis. Patient was restarted on captopril, which in scleroderma renal crisis shows significant improvement in BPs (which patient also demonstrated), however needed to reduce dose because she had been given a significantly higher dose at OSH and was relatively hypotensive. Kidney biopsy was performed by Interventional Radiology which demonstrated ____________.     Of note, patient has been mildly hyponatremic, urine studies were sent and demonstrated ____________    Patient is now hemodynamically stable and medically optimized for discharge (______home/to [facility]__) with referrals to appropriate clinics. 72F CAD, Lupus, MCTD, Raynauds, Hypothyroidism, HTN, CKD3, and recent COVID in June 2022 admitted to [facility] for acute hypoxemic respiratory failure with worsening DEMPSEY, required bipap initially during that admission and was later de-escalated to RA. She has a hx of lupus and was being followed by a rheumatologist as outpatient. At [place], she was evaluated by rheum and diagnosed with limited scleroderma with consideration for scleroderma renal crisis. Renal was consulted for eval of NED with concern for b/l renal artery disease vs scleroderma renal crisis. She was transferred to [facility] for further evaluation, as OS did not have renal duplex US capabilities. Extensive nephrology workup was performed.  Renal duplex US was negative for SHILA, leaving differential of scleroderma renal crisis vs. Lupus nephritis. Patient was restarted on captopril, which in scleroderma renal crisis shows significant improvement in BPs (which patient also demonstrated), however needed to reduce dose because she had been given a significantly higher dose at OSH and was relatively hypotensive. Patient was scheduled for renal biopsy with Interventional Radiology which was postponed multiple times due to elevations in blood pressure >150s. Her captopril dose was increased and she was started on felodipine for additional blood pressure control. Patient was seen by rheumatology who recommended discontinuing her Coreg. She was also seen by hematology for elevated Kappa/lambda ratio who recommended bone marrow biopsy as an outpatient. Patient's blood pressures eventually improved with uptitration of her antihypertensives and she was able to obtain her renal biopsy. Throughout this admission, the patient has had mild hyponatremia; nephrology was following but did not require any intervention.    Patient is now hemodynamically stable and medically optimized for discharge home with follow-up with nephrology, rheumatology, hematology, and her PCP. 72F CAD, Lupus, MCTD, Raynauds, Hypothyroidism, HTN, CKD3, and recent COVID in June 2022 admitted to [facility] for acute hypoxemic respiratory failure with worsening DEMPSEY, required bipap initially during that admission and was later de-escalated to RA. She has a hx of lupus and was being followed by a rheumatologist as outpatient. At [place], she was evaluated by rheum and diagnosed with limited scleroderma with consideration for scleroderma renal crisis. Renal was consulted for eval of NED with concern for b/l renal artery disease vs scleroderma renal crisis. She was transferred to [facility] for further evaluation, as OS did not have renal duplex US capabilities. Extensive nephrology workup was performed.  Renal duplex US was negative for SHILA, leaving differential of scleroderma renal crisis vs. Lupus nephritis. Patient was restarted on captopril, which in scleroderma renal crisis shows significant improvement in BPs (which patient also demonstrated), however needed to reduce dose because she had been given a significantly higher dose at OSH and was relatively hypotensive. Patient was scheduled for renal biopsy with Interventional Radiology, which was postponed multiple times due to elevations in blood pressure >150s. Her captopril dose was increased and she was started on felodipine for additional blood pressure control with some improvement in her blood pressure. However, despite these interventions, her blood pressures remained too elevated for IR renal biopsy and the decision was made to follow-up as an outpatient. During this admission, patient was also seen by rheumatology who recommended discontinuing her Coreg as it may worsen her scleroderma renal crisis as well as starting her on a prednisone taper. She was also seen by hematology for elevated Kappa/lambda ratio who recommended bone marrow biopsy as an outpatient. Throughout this admission, the patient has also had mild hyponatremia; nephrology was following but did not require any intervention.    Patient is now hemodynamically stable and medically optimized for discharge home with follow-up with interventional radiology, nephrology, rheumatology, hematology, and her PCP.

## 2022-09-26 NOTE — PROGRESS NOTE ADULT - SUBJECTIVE AND OBJECTIVE BOX
Date/Time Patient Seen:  		  Referring MD:   Data Reviewed	       Patient is a 72y old  Female who presents with a chief complaint of SOB (25 Sep 2022 13:53)      Subjective/HPI     PAST MEDICAL & SURGICAL HISTORY:  COPD exacerbation          Medication list         MEDICATIONS  (STANDING):  albuterol/ipratropium for Nebulization 3 milliLiter(s) Nebulizer every 8 hours  amLODIPine   Tablet 5 milliGRAM(s) Oral daily  carvedilol 12.5 milliGRAM(s) Oral every 12 hours  furosemide    Tablet 40 milliGRAM(s) Oral daily  heparin   Injectable 5000 Unit(s) SubCutaneous every 8 hours  levothyroxine 75 MICROGram(s) Oral daily  nicotine -  14 mG/24Hr(s) Patch 1 Patch Transdermal daily  polyethylene glycol 3350 17 Gram(s) Oral daily  predniSONE   Tablet 10 milliGRAM(s) Oral daily  senna 2 Tablet(s) Oral at bedtime    MEDICATIONS  (PRN):  acetaminophen     Tablet .. 650 milliGRAM(s) Oral every 6 hours PRN Temp greater or equal to 38C (100.4F), Mild Pain (1 - 3)  albuterol/ipratropium for Nebulization 3 milliLiter(s) Nebulizer every 3 hours PRN Shortness of Breath and/or Wheezing  labetalol Injectable 10 milliGRAM(s) IV Push every 6 hours PRN Systolic blood pressure > 180  nitroglycerin     SubLingual 0.4 milliGRAM(s) SubLingual every 5 minutes PRN Chest Pain         Vitals log        ICU Vital Signs Last 24 Hrs  T(C): 36.5 (26 Sep 2022 04:10), Max: 36.7 (25 Sep 2022 08:00)  T(F): 97.7 (26 Sep 2022 04:10), Max: 98.1 (25 Sep 2022 08:00)  HR: 76 (26 Sep 2022 06:00) (60 - 86)  BP: 179/72 (26 Sep 2022 06:00) (131/71 - 179/72)  BP(mean): 100 (26 Sep 2022 06:00) (80 - 104)  ABP: --  ABP(mean): --  RR: 27 (26 Sep 2022 06:00) (13 - 27)  SpO2: 100% (26 Sep 2022 06:00) (96% - 100%)    O2 Parameters below as of 26 Sep 2022 06:00  Patient On (Oxygen Delivery Method): room air                 Input and Output:  I&O's Detail    24 Sep 2022 07:01  -  25 Sep 2022 07:00  --------------------------------------------------------  IN:    Oral Fluid: 420 mL  Total IN: 420 mL    OUT:    Blood Loss (mL): 300 mL    Voided (mL): 1100 mL  Total OUT: 1400 mL    Total NET: -980 mL      25 Sep 2022 07:01  -  26 Sep 2022 06:52  --------------------------------------------------------  IN:    Oral Fluid: 960 mL  Total IN: 960 mL    OUT:    Voided (mL): 1900 mL  Total OUT: 1900 mL    Total NET: -940 mL          Lab Data                        11.6   8.83  )-----------( 229      ( 25 Sep 2022 06:59 )             35.2     09-25    133<L>  |  95<L>  |  89<H>  ----------------------------<  97  4.4   |  27  |  2.81<H>    Ca    9.4      25 Sep 2022 06:59  Phos  7.4     09-25  Mg     2.2     09-25    TPro  8.0  /  Alb  4.0  /  TBili  0.4  /  DBili  x   /  AST  16  /  ALT  24  /  AlkPhos  47  09-25            Review of Systems	      Objective     Physical Examination    heart s1s2  lung dec BS  head nc      Pertinent Lab findings & Imaging      Tish:  NO   Adequate UO     I&O's Detail    24 Sep 2022 07:01  -  25 Sep 2022 07:00  --------------------------------------------------------  IN:    Oral Fluid: 420 mL  Total IN: 420 mL    OUT:    Blood Loss (mL): 300 mL    Voided (mL): 1100 mL  Total OUT: 1400 mL    Total NET: -980 mL      25 Sep 2022 07:01  -  26 Sep 2022 06:52  --------------------------------------------------------  IN:    Oral Fluid: 960 mL  Total IN: 960 mL    OUT:    Voided (mL): 1900 mL  Total OUT: 1900 mL    Total NET: -940 mL               Discussed with:     Cultures:	        Radiology

## 2022-09-26 NOTE — DISCHARGE NOTE PROVIDER - NSDCMRMEDTOKEN_GEN_ALL_CORE_FT
amLODIPine 5 mg oral tablet: 1 tab(s) orally once a day  aspirin 81 mg oral tablet, chewable: 1 tab(s) orally once a day  captopril 12.5 mg oral tablet: 1 tab(s) orally once a day  carvedilol 12.5 mg oral tablet: 1 tab(s) orally every 12 hours  Crestor 10 mg oral tablet: 1 tab(s) orally once a day  furosemide 40 mg oral tablet: 1 tab(s) orally once a day  levothyroxine 75 mcg (0.075 mg) oral tablet: 1 tab(s) orally once a day  predniSONE 10 mg oral tablet: 1 tab(s) orally once a day  Prevacid 30 mg oral delayed release capsule: 1 cap(s) orally once a day   acetaminophen 325 mg oral tablet: 2 tab(s) orally every 6 hours, As needed, Temp greater or equal to 38C (100.4F), Mild Pain (1 - 3)  amLODIPine 5 mg oral tablet: 1 tab(s) orally once a day  aspirin 81 mg oral tablet, chewable: 1 tab(s) orally once a day  captopril 12.5 mg oral tablet: 1 tab(s) orally once a day  carvedilol 12.5 mg oral tablet: 1 tab(s) orally every 12 hours  Crestor 10 mg oral tablet: 1 tab(s) orally once a day  furosemide 40 mg oral tablet: 1 tab(s) orally once a day  levothyroxine 75 mcg (0.075 mg) oral tablet: 1 tab(s) orally once a day  nicotine 14 mg/24 hr transdermal film, extended release: transdermal once a day  polyethylene glycol 3350 oral powder for reconstitution: 17 gram(s) orally once a day  predniSONE 10 mg oral tablet: 1 tab(s) orally once a day  Prevacid 30 mg oral delayed release capsule: 1 cap(s) orally once a day  senna leaf extract oral tablet: 2 tab(s) orally once a day (at bedtime)   amLODIPine 5 mg oral tablet: 1 tab(s) orally once a day  aspirin 81 mg oral tablet, chewable: 1 tab(s) orally once a day  Crestor 10 mg oral tablet: 1 tab(s) orally once a day  furosemide 40 mg oral tablet: 1 tab(s) orally once a day  levothyroxine 75 mcg (0.075 mg) oral tablet: 1 tab(s) orally once a day  nicotine 14 mg/24 hr transdermal film, extended release: transdermal once a day  polyethylene glycol 3350 oral powder for reconstitution: 17 gram(s) orally once a day  Prevacid 30 mg oral delayed release capsule: 1 cap(s) orally once a day  senna leaf extract oral tablet: 2 tab(s) orally once a day (at bedtime)   amLODIPine 5 mg oral tablet: 1 tab(s) orally once a day  aspirin 81 mg oral tablet, chewable: 1 tab(s) orally once a day  captopril 25 mg oral tablet: 1 tab(s) orally 3 times a day  Crestor 10 mg oral tablet: 1 tab(s) orally once a day  felodipine 5 mg oral tablet, extended release: 1 tab(s) orally once a day  furosemide 40 mg oral tablet: 1 tab(s) orally once a day  levothyroxine 75 mcg (0.075 mg) oral tablet: 1 tab(s) orally once a day  nicotine 14 mg/24 hr transdermal film, extended release: transdermal once a day  polyethylene glycol 3350 oral powder for reconstitution: 17 gram(s) orally once a day  predniSONE 2.5 mg oral tablet: 3 tab(s) orally once a day for 1 day (10/8)  THEN  2 tab(s) orally once a day for 3 days (10/9-10/11)  Prevacid 30 mg oral delayed release capsule: 1 cap(s) orally once a day  senna leaf extract oral tablet: 2 tab(s) orally once a day (at bedtime)   aspirin 81 mg oral tablet, chewable: 1 tab(s) orally once a day  captopril 12.5 mg oral tablet: 3 tab(s) orally 3 times a day   Dose: 37.5mg 3x daily  Crestor 10 mg oral tablet: 1 tab(s) orally once a day  felodipine 5 mg oral tablet, extended release: 1 tab(s) orally once a day  furosemide 40 mg oral tablet: 1 tab(s) orally once a day  levothyroxine 75 mcg (0.075 mg) oral tablet: 1 tab(s) orally once a day  nicotine 14 mg/24 hr transdermal film, extended release: transdermal once a day  polyethylene glycol 3350 oral powder for reconstitution: 17 gram(s) orally once a day  predniSONE 2.5 mg oral tablet: 3 tab(s) orally once a day for 1 day (10/8)  THEN  2 tab(s) orally once a day for 3 days (10/9-10/11)  Prevacid 30 mg oral delayed release capsule: 1 cap(s) orally once a day  senna leaf extract oral tablet: 2 tab(s) orally once a day (at bedtime)   aspirin 81 mg oral tablet, chewable: 1 tab(s) orally once a day  captopril 12.5 mg oral tablet: 3 tab(s) orally 3 times a day   Dose: 37.5mg 3x daily  Crestor 10 mg oral tablet: 1 tab(s) orally once a day  felodipine 5 mg oral tablet, extended release: 1 tab(s) orally once a day  furosemide 40 mg oral tablet: 1 tab(s) orally once a day  levothyroxine 75 mcg (0.075 mg) oral tablet: 1 tab(s) orally once a day  lisinopril 10 mg oral tablet: 1 tab(s) orally once a day   nicotine 14 mg/24 hr transdermal film, extended release: transdermal once a day  polyethylene glycol 3350 oral powder for reconstitution: 17 gram(s) orally once a day  predniSONE 2.5 mg oral tablet: 3 tab(s) orally once a day for 1 day (10/8)  THEN  2 tab(s) orally once a day for 3 days (10/9-10/11)  Prevacid 30 mg oral delayed release capsule: 1 cap(s) orally once a day  senna leaf extract oral tablet: 2 tab(s) orally once a day (at bedtime)

## 2022-09-26 NOTE — PROGRESS NOTE ADULT - ASSESSMENT
The patient is a 72 year old female with a history of HTN, HL, hypothyroidism who presents with shortness of breath.    Plan:  - ECG with nonspecific findings but no evidence of ischemia or infarction  - Troponin mildly elevated at 192 in the setting of demand ischemia from heart failure, NED and respiratory failure. No need to trend further.  - CT chest with mild pulm edema and trace pleural effusions  - Echo with mildly reduced LV systolic function, mod MR, mod AR  - Continue furosemide 40 mg PO daily  - Hold spironolactone and ramipril due to NED  - Continue aspirin 81 mg daily  - Continue atorvastatin 20 mg daily (formulary equivalent)  - Continue carvedilol 12.5 mg bid  - Continue amlodipine 5 mg daily  - Discharge planning

## 2022-09-26 NOTE — PROGRESS NOTE ADULT - ASSESSMENT
72F CAD, Lupus, Hypothyroidism, HTN, CKD3, and recent COVID in June 2022 admitted for Acute Hypoxic Respiratory Failure.     Acute Hypoxic Respiratory Failure  Resolved; CT Chest reviewed. Has Pulmonary Edema and history of CAD; Echo with diastolic Dysfunction and EF 45-50%  Given history of MCTD will need to be concerned for ILD and PAH  Lasix IV Daily and monitor Urine output; Supplemental O2 PRN   Pulmonary and Cardiology Consultation noted   diuretic holiday per nephro, resume PO lasix tomorrow    Acute Renal Failure on CKD 3   Creatinine has not improved; High Suspicion for Scleroderma Renal Crisis at this point  Captopril started for HTN; Have consulted with Nephrology  Possible need for high dose steroids; May need sooner than later renal biopsy  Will pursue transfer to [facility] due to no Rheumatology support here    MCTD   Confirmed with her Rheumatologist that she has features of Scleroderma and SLE   Labs and exam concerning for Scleroderma Renal Crisis   Currently we are giving her non pulse dose steroids - reduced to 10 mg daily    CAD / HTN   Patient BP uncontrolled; History of PCI  Lasix and Hydralazine; Hold ACE I but may resume after discussion with Rheum and Nephro  BP continues to remain uncontrolled, increase carvedilol to 12.5 mg BID  cont Amlodipine 5, labetalol IV PRN    Hypothyroidism  Follow up TSH and Free T4  Synthroid increased    Diet  Regular    DVT Prophylaxis  Heparin SC     Disposition   Discharge planning pending hospital course

## 2022-09-26 NOTE — PROGRESS NOTE ADULT - SUBJECTIVE AND OBJECTIVE BOX
Chief Complaint: Shortness of breath    Interval Events: No events overnight.    Review of Systems:  General: No fevers, chills, weight gain  Skin: No rashes, color changes  Cardiovascular: No chest pain, orthopnea  Respiratory: No shortness of breath, cough  Gastrointestinal: No nausea, abdominal pain  Genitourinary: No incontinence, pain with urination  Musculoskeletal: No pain, swelling, decreased range of motion  Neurological: No headache, weakness  Psychiatric: No depression, anxiety  Endocrine: No weight gain, increased thirst  All other systems are comprehensively negative.    Physical Exam:  Vital Signs Last 24 Hrs  T(C): 36.8 (26 Sep 2022 07:54), Max: 36.8 (26 Sep 2022 07:54)  T(F): 98.2 (26 Sep 2022 07:54), Max: 98.2 (26 Sep 2022 07:54)  HR: 71 (26 Sep 2022 10:00) (60 - 79)  BP: 143/66 (26 Sep 2022 10:00) (131/71 - 179/72)  BP(mean): 87 (26 Sep 2022 10:00) (80 - 104)  RR: 19 (26 Sep 2022 10:00) (13 - 27)  SpO2: 100% (26 Sep 2022 10:00) (95% - 100%)  Parameters below as of 26 Sep 2022 10:00  Patient On (Oxygen Delivery Method): room air  General: NAD  HEENT: MMM  Neck: No JVD, no carotid bruit  Lungs: CTAB  CV: RRR, nl S1/S2, no M/R/G  Abdomen: S/NT/ND, +BS  Extremities: No LE edema, no cyanosis  Neuro: AAOx3, non-focal  Skin: No rash    Labs:    09-26    135  |  98  |  96<H>  ----------------------------<  86  4.4   |  27  |  2.79<H>    Ca    9.4      26 Sep 2022 06:00  Phos  7.4     09-25  Mg     2.2     09-25    TPro  8.0  /  Alb  4.0  /  TBili  0.4  /  DBili  x   /  AST  16  /  ALT  24  /  AlkPhos  47  09-25                        11.6   8.83  )-----------( 229      ( 25 Sep 2022 06:59 )             35.2       Telemetry: Sinus rhythm

## 2022-09-26 NOTE — DISCHARGE NOTE PROVIDER - PROVIDER TOKENS
PROVIDER:[TOKEN:[[id number]]] PROVIDER:[TOKEN:[[id number]],FOLLOWUP:[2 weeks]],PROVIDER:[TOKEN:[[id number]],FOLLOWUP:[2 weeks]],PROVIDER:[TOKEN:[[id number]],FOLLOWUP:[2 weeks]]

## 2022-09-26 NOTE — DISCHARGE NOTE PROVIDER - NSDCCAREPROVSEEN_GEN_ALL_CORE_FT
Courtney, Gurpreet Hightower, Maryjane Padron, Milan Cortes, Unruly Dong [facility] Medicine, Team 5

## 2022-09-26 NOTE — DISCHARGE NOTE PROVIDER - NSDCFUADDAPPT_GEN_ALL_CORE_FT
APPTS ARE READY TO BE MADE: [x] YES      Additional Information about above appointments (if needed):    1: PCP in 1 week   2: Nephrologist in 2 weeks  3: Rheumatologist in 2 weeks     Other comments or requests:    Please follow up with Dr. Shay (PCP) within 1-2 weeks of discharge.  Please follow up with Dr. Boyle (rheumatology) within 1-2 weeks of discharge.  Please follow up with Dr. Dunlap (nephrology) within 1-2 weeks of discharge.  Please follow up with [facility] (hematology) within 1-2 weeks of discharge.    APPTS ARE READY TO BE MADE: [x] YES    Additional Information about above appointments (if needed):    1: PCP in 1 week   2: Nephrologist in 2 weeks  3: Rheumatologist in 2 weeks     Other comments or requests:    Please follow up with Dr. Shay (PCP) within 1-2 weeks of discharge.  Please follow up with Dr. Boyle (rheumatology) within 1-2 weeks of discharge.  Please follow up with Dr. Dunlap (nephrology) within 1-2 weeks of discharge.  Please follow up with interventional radiology (Phone: [phone]) after your blood pressure is better controlled.  Please follow up with [facility] (hematology) within 1-2 weeks of discharge.    APPTS ARE READY TO BE MADE: [x] YES    Additional Information about above appointments (if needed):    1: PCP in 1 week   2: Nephrologist in 2 weeks  3: Rheumatologist in 2 weeks     Other comments or requests:

## 2022-09-26 NOTE — PROGRESS NOTE ADULT - SUBJECTIVE AND OBJECTIVE BOX
Subjective; Sitting in the chair.     MEDICATIONS  (STANDING):  albuterol/ipratropium for Nebulization 3 milliLiter(s) Nebulizer every 8 hours  amLODIPine   Tablet 5 milliGRAM(s) Oral daily  captopril 12.5 milliGRAM(s) Oral daily  carvedilol 12.5 milliGRAM(s) Oral every 12 hours  furosemide    Tablet 40 milliGRAM(s) Oral daily  heparin   Injectable 5000 Unit(s) SubCutaneous every 8 hours  levothyroxine 75 MICROGram(s) Oral daily  nicotine -  14 mG/24Hr(s) Patch 1 Patch Transdermal daily  polyethylene glycol 3350 17 Gram(s) Oral daily  predniSONE   Tablet 10 milliGRAM(s) Oral daily  senna 2 Tablet(s) Oral at bedtime    MEDICATIONS  (PRN):  acetaminophen     Tablet .. 650 milliGRAM(s) Oral every 6 hours PRN Temp greater or equal to 38C (100.4F), Mild Pain (1 - 3)  albuterol/ipratropium for Nebulization 3 milliLiter(s) Nebulizer every 3 hours PRN Shortness of Breath and/or Wheezing  labetalol Injectable 10 milliGRAM(s) IV Push every 6 hours PRN Systolic blood pressure > 180  nitroglycerin     SubLingual 0.4 milliGRAM(s) SubLingual every 5 minutes PRN Chest Pain      Allergies    hydrALAZINE (Angioedema; Rash)    Intolerances        Vital Signs Last 24 Hrs  T(C): 36.6 (26 Sep 2022 11:29), Max: 36.8 (26 Sep 2022 07:54)  T(F): 97.9 (26 Sep 2022 11:29), Max: 98.2 (26 Sep 2022 07:54)  HR: 75 (26 Sep 2022 13:06) (60 - 79)  BP: 150/66 (26 Sep 2022 13:06) (131/71 - 179/72)  BP(mean): 88 (26 Sep 2022 13:06) (80 - 104)  RR: 21 (26 Sep 2022 13:06) (13 - 27)  SpO2: 100% (26 Sep 2022 13:06) (95% - 100%)    Parameters below as of 26 Sep 2022 13:06  Patient On (Oxygen Delivery Method): room air        PHYSICAL EXAM:  GENERAL: NAD, well-groomed, well-developed  HEAD:  Atraumatic, Normocephalic  ENMT: Moist mucous membranes,   NECK: Supple, No JVD, Normal thyroid  NERVOUS SYSTEM:  All 4 extremities mobile, no gross sensory deficits.   CHEST/LUNG: Clear to auscultation bilaterally; No rales, rhonchi, wheezing, or rubs  HEART: Regular rate and rhythm; No murmurs, rubs, or gallops  ABDOMEN: Soft, Nontender, Nondistended; Bowel sounds present  EXTREMITIES:  2+ Peripheral Pulses, No clubbing, cyanosis, or edema      LABS:    26 Sep 2022 06:00    135    |  98     |  96     ----------------------------<  86     4.4     |  27     |  2.79     Ca    9.4        26 Sep 2022 06:00        Urinalysis Basic - ( 25 Sep 2022 14:47 )    Color: Yellow / Appearance: Clear / S.010 / pH: x  Gluc: x / Ketone: Negative  / Bili: Negative / Urobili: Negative mg/dL   Blood: x / Protein: 15 mg/dL / Nitrite: Negative   Leuk Esterase: Trace / RBC: 0-2 /HPF / WBC 3-5   Sq Epi: x / Non Sq Epi: Few / Bacteria: Few      CAPILLARY BLOOD GLUCOSE          RADIOLOGY & ADDITIONAL TESTS:    Imaging Personally Reviewed:  [ ] YES     Consultant(s) Notes Reviewed:      Care Discussed with Consultants/Other Providers:    Advanced Directives: [ ] DNR  [ ] No feeding tube  [ ] MOLST in chart  [ ] MOLST completed today  [ ] Unknown

## 2022-09-27 LAB
ALBUMIN SERPL ELPH-MCNC: 3.6 G/DL — SIGNIFICANT CHANGE UP (ref 3.3–5)
ALP SERPL-CCNC: 48 U/L — SIGNIFICANT CHANGE UP (ref 30–120)
ALT FLD-CCNC: 17 U/L DA — SIGNIFICANT CHANGE UP (ref 10–60)
ANION GAP SERPL CALC-SCNC: 9 MMOL/L — SIGNIFICANT CHANGE UP (ref 5–17)
AST SERPL-CCNC: 12 U/L — SIGNIFICANT CHANGE UP (ref 10–40)
BASOPHILS # BLD AUTO: 0 K/UL — SIGNIFICANT CHANGE UP (ref 0–0.2)
BASOPHILS NFR BLD AUTO: 0 % — SIGNIFICANT CHANGE UP (ref 0–2)
BILIRUB SERPL-MCNC: 0.5 MG/DL — SIGNIFICANT CHANGE UP (ref 0.2–1.2)
BUN SERPL-MCNC: 88 MG/DL — HIGH (ref 7–23)
CALCIUM SERPL-MCNC: 8.8 MG/DL — SIGNIFICANT CHANGE UP (ref 8.4–10.5)
CHLORIDE SERPL-SCNC: 96 MMOL/L — SIGNIFICANT CHANGE UP (ref 96–108)
CO2 SERPL-SCNC: 24 MMOL/L — SIGNIFICANT CHANGE UP (ref 22–31)
CREAT SERPL-MCNC: 2.54 MG/DL — HIGH (ref 0.5–1.3)
CULTURE RESULTS: SIGNIFICANT CHANGE UP
CULTURE RESULTS: SIGNIFICANT CHANGE UP
EGFR: 20 ML/MIN/1.73M2 — LOW
EOSINOPHIL # BLD AUTO: 0.07 K/UL — SIGNIFICANT CHANGE UP (ref 0–0.5)
EOSINOPHIL NFR BLD AUTO: 0.9 % — SIGNIFICANT CHANGE UP (ref 0–6)
GLUCOSE SERPL-MCNC: 116 MG/DL — HIGH (ref 70–99)
HCT VFR BLD CALC: 33.1 % — LOW (ref 34.5–45)
HGB BLD-MCNC: 11.1 G/DL — LOW (ref 11.5–15.5)
IMM GRANULOCYTES NFR BLD AUTO: 0.4 % — SIGNIFICANT CHANGE UP (ref 0–0.9)
LDH SERPL L TO P-CCNC: 195 U/L — SIGNIFICANT CHANGE UP (ref 50–242)
LYMPHOCYTES # BLD AUTO: 1.12 K/UL — SIGNIFICANT CHANGE UP (ref 1–3.3)
LYMPHOCYTES # BLD AUTO: 13.8 % — SIGNIFICANT CHANGE UP (ref 13–44)
MCHC RBC-ENTMCNC: 31.5 PG — SIGNIFICANT CHANGE UP (ref 27–34)
MCHC RBC-ENTMCNC: 33.5 GM/DL — SIGNIFICANT CHANGE UP (ref 32–36)
MCV RBC AUTO: 94 FL — SIGNIFICANT CHANGE UP (ref 80–100)
MONOCYTES # BLD AUTO: 0.74 K/UL — SIGNIFICANT CHANGE UP (ref 0–0.9)
MONOCYTES NFR BLD AUTO: 9.1 % — SIGNIFICANT CHANGE UP (ref 2–14)
NEUTROPHILS # BLD AUTO: 6.15 K/UL — SIGNIFICANT CHANGE UP (ref 1.8–7.4)
NEUTROPHILS NFR BLD AUTO: 75.8 % — SIGNIFICANT CHANGE UP (ref 43–77)
NRBC # BLD: 0 /100 WBCS — SIGNIFICANT CHANGE UP (ref 0–0)
PLATELET # BLD AUTO: 233 K/UL — SIGNIFICANT CHANGE UP (ref 150–400)
POTASSIUM SERPL-MCNC: 3.5 MMOL/L — SIGNIFICANT CHANGE UP (ref 3.5–5.3)
POTASSIUM SERPL-SCNC: 3.5 MMOL/L — SIGNIFICANT CHANGE UP (ref 3.5–5.3)
PROT SERPL-MCNC: 7.9 G/DL — SIGNIFICANT CHANGE UP (ref 6–8.3)
RBC # BLD: 3.52 M/UL — LOW (ref 3.8–5.2)
RBC # FLD: 14.2 % — SIGNIFICANT CHANGE UP (ref 10.3–14.5)
SARS-COV-2 RNA SPEC QL NAA+PROBE: SIGNIFICANT CHANGE UP
SODIUM SERPL-SCNC: 129 MMOL/L — LOW (ref 135–145)
SPECIMEN SOURCE: SIGNIFICANT CHANGE UP
SPECIMEN SOURCE: SIGNIFICANT CHANGE UP
WBC # BLD: 8.11 K/UL — SIGNIFICANT CHANGE UP (ref 3.8–10.5)
WBC # FLD AUTO: 8.11 K/UL — SIGNIFICANT CHANGE UP (ref 3.8–10.5)

## 2022-09-27 PROCEDURE — 99232 SBSQ HOSP IP/OBS MODERATE 35: CPT

## 2022-09-27 RX ORDER — CAPTOPRIL 12.5 MG/1
25 TABLET ORAL DAILY
Refills: 0 | Status: DISCONTINUED | OUTPATIENT
Start: 2022-09-27 | End: 2022-09-28

## 2022-09-27 RX ADMIN — Medication 3 MILLILITER(S): at 15:36

## 2022-09-27 RX ADMIN — HEPARIN SODIUM 5000 UNIT(S): 5000 INJECTION INTRAVENOUS; SUBCUTANEOUS at 14:32

## 2022-09-27 RX ADMIN — Medication 3 MILLILITER(S): at 22:53

## 2022-09-27 RX ADMIN — Medication 1 PATCH: at 08:20

## 2022-09-27 RX ADMIN — Medication 1 PATCH: at 21:32

## 2022-09-27 RX ADMIN — Medication 3 MILLILITER(S): at 07:44

## 2022-09-27 RX ADMIN — Medication 1 PATCH: at 21:09

## 2022-09-27 RX ADMIN — Medication 1 PATCH: at 20:02

## 2022-09-27 RX ADMIN — Medication 10 MILLIGRAM(S): at 05:37

## 2022-09-27 RX ADMIN — CARVEDILOL PHOSPHATE 12.5 MILLIGRAM(S): 80 CAPSULE, EXTENDED RELEASE ORAL at 17:22

## 2022-09-27 RX ADMIN — CARVEDILOL PHOSPHATE 12.5 MILLIGRAM(S): 80 CAPSULE, EXTENDED RELEASE ORAL at 05:37

## 2022-09-27 RX ADMIN — HEPARIN SODIUM 5000 UNIT(S): 5000 INJECTION INTRAVENOUS; SUBCUTANEOUS at 05:37

## 2022-09-27 RX ADMIN — Medication 40 MILLIGRAM(S): at 09:25

## 2022-09-27 RX ADMIN — HEPARIN SODIUM 5000 UNIT(S): 5000 INJECTION INTRAVENOUS; SUBCUTANEOUS at 21:33

## 2022-09-27 RX ADMIN — Medication 75 MICROGRAM(S): at 05:44

## 2022-09-27 RX ADMIN — AMLODIPINE BESYLATE 5 MILLIGRAM(S): 2.5 TABLET ORAL at 05:36

## 2022-09-27 NOTE — PROGRESS NOTE ADULT - ASSESSMENT
72 y.o. F presents by EMS c/o not feeling well, initial call to EMS was for difficulty breathing, at home family mentioned possible facial droop, pt in significant respiratory distress on presentation to ED    resp distress  CHF  pleural eff  pulm edema  COPD  OP  OA  Ex Smoker  hx of Pulm Nodules    eval for scleroderma renal crisis  poss transfer to tertiary care center  vs noted  labs reviewed  will dc Prednisone  diuresis in progress  rheum input appreciated  clinically appears better      diuresis - cvs rx regimen optimization - cardio eval   I and O  serial labs  replete lytes  monitor VS and HD  card tele and pulse ox monitoring  VBG  NIPPV - prn use - o2 support - wean fio2 as tolerated - keep sat > 88 pct  COPD - Bronchodilators and s/p short course of Systemic Steroids - low dose  CT imaging reviewed - esoph dilation - effusions - atelectasis  effusions - no indication for thoracentesis at present - med rx regimen - card rx   I anastasia as able to cooperate  asp prec  HOB elev  Serial Renal Indices -   GOC discussion

## 2022-09-27 NOTE — PROGRESS NOTE ADULT - SUBJECTIVE AND OBJECTIVE BOX
Denies CP, SOB, no N/V    Vital Signs Last 24 Hrs  T(C): 36.7 (09-27-22 @ 15:50), Max: 36.9 (09-26-22 @ 23:38)  T(F): 98 (09-27-22 @ 15:50), Max: 98.4 (09-26-22 @ 23:38)  HR: 81 (09-27-22 @ 18:00) (54 - 87)  BP: 140/71 (09-27-22 @ 18:00) (105/65 - 165/75)  BP(mean): 90 (09-27-22 @ 18:00) (64 - 106)  RR: 20 (09-27-22 @ 18:00) (12 - 27)  SpO2: 100% (09-27-22 @ 18:00) (98% - 100%)    I&O's Detail    26 Sep 2022 07:01  -  27 Sep 2022 07:00  --------------------------------------------------------  IN:    Oral Fluid: 960 mL  Total IN: 960 mL    OUT:    Voided (mL): 1500 mL  Total OUT: 1500 mL    Total NET: -540 mL    27 Sep 2022 07:01  -  27 Sep 2022 20:36  --------------------------------------------------------  OUT:    Voided (mL): 800 mL  Total OUT: 800 mL    s1s2  b/l air entry  soft, ND  no edema  AO                        11.1   8.11  )-----------( 233      ( 27 Sep 2022 06:00 )             33.1     27 Sep 2022 06:00    129    |  96     |  88     ----------------------------<  116    3.5     |  24     |  2.54     Ca    8.8        27 Sep 2022 06:00    TPro  7.9    /  Alb  3.6    /  TBili  0.5    /  DBili  x      /  AST  12     /  ALT  17     /  AlkPhos  48     27 Sep 2022 06:00    LIVER FUNCTIONS - ( 27 Sep 2022 06:00 )  Alb: 3.6 g/dL / Pro: 7.9 g/dL / ALK PHOS: 48 U/L / ALT: 17 U/L DA / AST: 12 U/L / GGT: x           acetaminophen     Tablet .. 650 milliGRAM(s) Oral every 6 hours PRN  albuterol/ipratropium for Nebulization 3 milliLiter(s) Nebulizer every 8 hours  albuterol/ipratropium for Nebulization 3 milliLiter(s) Nebulizer every 3 hours PRN  amLODIPine   Tablet 5 milliGRAM(s) Oral daily  captopril 25 milliGRAM(s) Oral daily  carvedilol 12.5 milliGRAM(s) Oral every 12 hours  furosemide    Tablet 40 milliGRAM(s) Oral daily  heparin   Injectable 5000 Unit(s) SubCutaneous every 8 hours  labetalol Injectable 10 milliGRAM(s) IV Push every 6 hours PRN  levothyroxine 75 MICROGram(s) Oral daily  nicotine -  14 mG/24Hr(s) Patch 1 Patch Transdermal daily  nitroglycerin     SubLingual 0.4 milliGRAM(s) SubLingual every 5 minutes PRN  polyethylene glycol 3350 17 Gram(s) Oral daily  senna 2 Tablet(s) Oral at bedtime    Impression/Plan:    CM, mod AR, MR  Hx SLE, MCTD, suspected Scleroderma w/Raynaud  Adm w/SOB, improved  BP stable  NED in setting of CM, valvular heart disease, MCTD (Cr 1.19 - 6/4/22, Cr 0.94 - 10/25/21)  Started on ACE  No overt volume overload  Would hold Lasix  Cr is better today  BMP in am  Avoid nephrotoxins  Rheum f/u  Awaits tx to [facility] for further management  D/w pt and family at bedside  Will follow    [phone]

## 2022-09-27 NOTE — PROGRESS NOTE ADULT - SUBJECTIVE AND OBJECTIVE BOX
Chief Complaint: Shortness of breath    Interval Events: No events overnight.    Review of Systems:  General: No fevers, chills, weight gain  Skin: No rashes, color changes  Cardiovascular: No chest pain, orthopnea  Respiratory: No shortness of breath, cough  Gastrointestinal: No nausea, abdominal pain  Genitourinary: No incontinence, pain with urination  Musculoskeletal: No pain, swelling, decreased range of motion  Neurological: No headache, weakness  Psychiatric: No depression, anxiety  Endocrine: No weight gain, increased thirst  All other systems are comprehensively negative.    Physical Exam:  Vital Signs Last 24 Hrs  T(C): 36.8 (27 Sep 2022 08:00), Max: 36.9 (26 Sep 2022 23:38)  T(F): 98.3 (27 Sep 2022 08:00), Max: 98.4 (26 Sep 2022 23:38)  HR: 76 (27 Sep 2022 10:00) (54 - 81)  BP: 115/53 (27 Sep 2022 10:00) (105/65 - 165/69)  BP(mean): 64 (27 Sep 2022 10:00) (64 - 101)  RR: 18 (27 Sep 2022 10:00) (12 - 26)  SpO2: 100% (27 Sep 2022 10:00) (98% - 100%)  Parameters below as of 27 Sep 2022 10:00  Patient On (Oxygen Delivery Method): room air  General: NAD  HEENT: MMM  Neck: No JVD, no carotid bruit  Lungs: CTAB  CV: RRR, nl S1/S2, no M/R/G  Abdomen: S/NT/ND, +BS  Extremities: No LE edema, no cyanosis  Neuro: AAOx3, non-focal  Skin: No rash    Labs:    09-27    129<L>  |  96  |  88<H>  ----------------------------<  116<H>  3.5   |  24  |  2.54<H>    Ca    8.8      27 Sep 2022 06:00    TPro  7.9  /  Alb  3.6  /  TBili  0.5  /  DBili  x   /  AST  12  /  ALT  17  /  AlkPhos  48  09-27                        11.1   8.11  )-----------( 233      ( 27 Sep 2022 06:00 )             33.1       Telemetry: Sinus rhythm

## 2022-09-27 NOTE — PROGRESS NOTE ADULT - SUBJECTIVE AND OBJECTIVE BOX
Date/Time Patient Seen:  		  Referring MD:   Data Reviewed	       Patient is a 72y old  Female who presents with a chief complaint of SOB (26 Sep 2022 15:58)      Subjective/HPI     PAST MEDICAL & SURGICAL HISTORY:  COPD exacerbation          Medication list         MEDICATIONS  (STANDING):  albuterol/ipratropium for Nebulization 3 milliLiter(s) Nebulizer every 8 hours  amLODIPine   Tablet 5 milliGRAM(s) Oral daily  captopril 12.5 milliGRAM(s) Oral daily  carvedilol 12.5 milliGRAM(s) Oral every 12 hours  furosemide    Tablet 40 milliGRAM(s) Oral daily  heparin   Injectable 5000 Unit(s) SubCutaneous every 8 hours  levothyroxine 75 MICROGram(s) Oral daily  nicotine -  14 mG/24Hr(s) Patch 1 Patch Transdermal daily  polyethylene glycol 3350 17 Gram(s) Oral daily  predniSONE   Tablet 10 milliGRAM(s) Oral daily  senna 2 Tablet(s) Oral at bedtime    MEDICATIONS  (PRN):  acetaminophen     Tablet .. 650 milliGRAM(s) Oral every 6 hours PRN Temp greater or equal to 38C (100.4F), Mild Pain (1 - 3)  albuterol/ipratropium for Nebulization 3 milliLiter(s) Nebulizer every 3 hours PRN Shortness of Breath and/or Wheezing  labetalol Injectable 10 milliGRAM(s) IV Push every 6 hours PRN Systolic blood pressure > 180  nitroglycerin     SubLingual 0.4 milliGRAM(s) SubLingual every 5 minutes PRN Chest Pain         Vitals log        ICU Vital Signs Last 24 Hrs  T(C): 36.8 (27 Sep 2022 04:00), Max: 36.9 (26 Sep 2022 23:38)  T(F): 98.3 (27 Sep 2022 04:00), Max: 98.4 (26 Sep 2022 23:38)  HR: 69 (27 Sep 2022 06:00) (54 - 80)  BP: 165/69 (27 Sep 2022 06:00) (111/67 - 165/69)  BP(mean): 96 (27 Sep 2022 06:00) (70 - 96)  ABP: --  ABP(mean): --  RR: 26 (27 Sep 2022 06:00) (12 - 26)  SpO2: 100% (27 Sep 2022 06:00) (95% - 100%)    O2 Parameters below as of 27 Sep 2022 06:00  Patient On (Oxygen Delivery Method): room air                 Input and Output:  I&O's Detail    25 Sep 2022 07:01  -  26 Sep 2022 07:00  --------------------------------------------------------  IN:    Oral Fluid: 960 mL  Total IN: 960 mL    OUT:    Voided (mL): 1900 mL  Total OUT: 1900 mL    Total NET: -940 mL      26 Sep 2022 07:01  -  27 Sep 2022 06:56  --------------------------------------------------------  IN:    Oral Fluid: 960 mL  Total IN: 960 mL    OUT:    Voided (mL): 1500 mL  Total OUT: 1500 mL    Total NET: -540 mL          Lab Data                        11.1   8.11  )-----------( 233      ( 27 Sep 2022 06:00 )             33.1     09-26    135  |  98  |  96<H>  ----------------------------<  86  4.4   |  27  |  2.79<H>    Ca    9.4      26 Sep 2022 06:00  Phos  7.4     09-25  Mg     2.2     09-25    TPro  8.0  /  Alb  4.0  /  TBili  0.4  /  DBili  x   /  AST  16  /  ALT  24  /  AlkPhos  47  09-25            Review of Systems	      Objective     Physical Examination    heart s1s2  lung dec BS  abd soft  head nc      Pertinent Lab findings & Imaging      Tish:  NO   Adequate UO     I&O's Detail    25 Sep 2022 07:01  -  26 Sep 2022 07:00  --------------------------------------------------------  IN:    Oral Fluid: 960 mL  Total IN: 960 mL    OUT:    Voided (mL): 1900 mL  Total OUT: 1900 mL    Total NET: -940 mL      26 Sep 2022 07:01  -  27 Sep 2022 06:56  --------------------------------------------------------  IN:    Oral Fluid: 960 mL  Total IN: 960 mL    OUT:    Voided (mL): 1500 mL  Total OUT: 1500 mL    Total NET: -540 mL               Discussed with:     Cultures:	        Radiology

## 2022-09-27 NOTE — PROGRESS NOTE ADULT - ASSESSMENT
The patient is a 72 year old female with a history of HTN, HL, hypothyroidism, SLE who presents with shortness of breath.    Plan:  - ECG with nonspecific findings but no evidence of ischemia or infarction  - Troponin mildly elevated at 192 in the setting of demand ischemia from heart failure, NED and respiratory failure. No need to trend further.  - CT chest with mild pulm edema and trace pleural effusions  - Echo with mildly reduced LV systolic function, mod MR, mod AR  - Continue furosemide 40 mg PO daily  - Hold spironolactone and ramipril due to NED  - Continue aspirin 81 mg daily  - Continue atorvastatin 20 mg daily (formulary equivalent)  - Continue carvedilol 12.5 mg bid  - Continue amlodipine 5 mg daily  - Plan now for transfer to tertiary center for possible scleroderma renal crisis

## 2022-09-27 NOTE — PROGRESS NOTE ADULT - ASSESSMENT
72F CAD, Lupus, Hypothyroidism, HTN, CKD3, and recent COVID in June 2022 admitted for Acute Hypoxic Respiratory Failure.     Scleroderma Renal Crisis / Acute Renal Failure on CKD 3   Captopril increased; Have consulted with Nephrology  May need sooner than later renal biopsy  Transfer to [facility] is underway due to patients Rheumatologist and PCP available there  Check LDH, Haptoglobin and Smear for disease activity    Acute Hypoxic Respiratory Failure  Resolved; CT Chest reviewed. Has Pulmonary Edema and history of CAD; Echo with diastolic Dysfunction and EF 45-50%  Given history of MCTD will need to be concerned for ILD and PAH  Pulmonary and Cardiology Consultation noted     MCTD   Confirmed with her Rheumatologist that she has features of Scleroderma and SLE   Labs and exam concerning for Scleroderma Renal Crisis   Prednisone 10 mg daily    CAD / HTN   Patient BP uncontrolled; History of PCI  Lasix and Hydralazine; Hold ACE I but may resume after discussion with Rheum and Nephro  BP continues to remain uncontrolled, increase carvedilol to 12.5 mg BID  cont Amlodipine 5, labetalol IV PRN    Hypothyroidism  Follow up TSH and Free T4  Synthroid increased    Diet  Regular    DVT Prophylaxis  Heparin SC     Disposition   Discharge planning pending hospital course

## 2022-09-27 NOTE — PROGRESS NOTE ADULT - SUBJECTIVE AND OBJECTIVE BOX
Subjective: Patient seen and examined. No overnight events.  Sitting in chair.     MEDICATIONS  (STANDING):  albuterol/ipratropium for Nebulization 3 milliLiter(s) Nebulizer every 8 hours  amLODIPine   Tablet 5 milliGRAM(s) Oral daily  captopril 12.5 milliGRAM(s) Oral daily  carvedilol 12.5 milliGRAM(s) Oral every 12 hours  furosemide    Tablet 40 milliGRAM(s) Oral daily  heparin   Injectable 5000 Unit(s) SubCutaneous every 8 hours  levothyroxine 75 MICROGram(s) Oral daily  nicotine -  14 mG/24Hr(s) Patch 1 Patch Transdermal daily  polyethylene glycol 3350 17 Gram(s) Oral daily  senna 2 Tablet(s) Oral at bedtime    MEDICATIONS  (PRN):  acetaminophen     Tablet .. 650 milliGRAM(s) Oral every 6 hours PRN Temp greater or equal to 38C (100.4F), Mild Pain (1 - 3)  albuterol/ipratropium for Nebulization 3 milliLiter(s) Nebulizer every 3 hours PRN Shortness of Breath and/or Wheezing  labetalol Injectable 10 milliGRAM(s) IV Push every 6 hours PRN Systolic blood pressure > 180  nitroglycerin     SubLingual 0.4 milliGRAM(s) SubLingual every 5 minutes PRN Chest Pain      Allergies    hydrALAZINE (Angioedema; Rash)    Intolerances        Vital Signs Last 24 Hrs  T(C): 36.8 (27 Sep 2022 08:00), Max: 36.9 (26 Sep 2022 23:38)  T(F): 98.3 (27 Sep 2022 08:00), Max: 98.4 (26 Sep 2022 23:38)  HR: 73 (27 Sep 2022 08:00) (54 - 81)  BP: 137/78 (27 Sep 2022 08:00) (111/67 - 165/69)  BP(mean): 93 (27 Sep 2022 08:00) (70 - 101)  RR: 24 (27 Sep 2022 08:00) (12 - 26)  SpO2: 100% (27 Sep 2022 08:00) (98% - 100%)    Parameters below as of 27 Sep 2022 08:00  Patient On (Oxygen Delivery Method): room air        PHYSICAL EXAM:  GENERAL: NAD, well-groomed, well-developed  HEAD:  Atraumatic, Normocephalic  ENMT: Moist mucous membranes,   NECK: Supple, No JVD, Normal thyroid  NERVOUS SYSTEM:  All 4 extremities mobile, no gross sensory deficits.   CHEST/LUNG: Clear to auscultation bilaterally; No rales, rhonchi, wheezing, or rubs  HEART: Regular rate and rhythm; No murmurs, rubs, or gallops  ABDOMEN: Soft, Nontender, Nondistended; Bowel sounds present  EXTREMITIES:  2+ Peripheral Pulses, No clubbing, cyanosis, or edema      LABS:                        11.1   8.11  )-----------( 233      ( 27 Sep 2022 06:00 )             33.1     27 Sep 2022 06:00    129    |  96     |  88     ----------------------------<  116    3.5     |  24     |  2.54     Ca    8.8        27 Sep 2022 06:00    TPro  7.9    /  Alb  3.6    /  TBili  0.5    /  DBili  x      /  AST  12     /  ALT  17     /  AlkPhos  48     27 Sep 2022 06:00      Urinalysis Basic - ( 25 Sep 2022 14:47 )    Color: Yellow / Appearance: Clear / S.010 / pH: x  Gluc: x / Ketone: Negative  / Bili: Negative / Urobili: Negative mg/dL   Blood: x / Protein: 15 mg/dL / Nitrite: Negative   Leuk Esterase: Trace / RBC: 0-2 /HPF / WBC 3-5   Sq Epi: x / Non Sq Epi: Few / Bacteria: Few      CAPILLARY BLOOD GLUCOSE          RADIOLOGY & ADDITIONAL TESTS:    Imaging Personally Reviewed:  [ ] YES     Consultant(s) Notes Reviewed:      Care Discussed with Consultants/Other Providers:    Advanced Directives: [ ] DNR  [ ] No feeding tube  [ ] MOLST in chart  [ ] MOLST completed today  [ ] Unknown

## 2022-09-27 NOTE — GOALS OF CARE CONVERSATION - ADVANCED CARE PLANNING - CONVERSATION DETAILS
STARS Writer met with pt at bedside. Reviewed patient's medical and social history as well as events leading to patient's hospitalization. Writer discussed patient's current diagnosis (CAD, lupus, hypothyroid, scleraderma renal crisis), medical condition and management, prognosis, and life expectancy. Inquired about patient's wishes regarding extent of medical care to be provided including escalation of medical care into the ICU and use of vasopressor support. In addition, the writer inquired about thoughts regarding cardiopulmonary resuscitation, artificial nutrition and hydration including use of feeding tubes and IVF, antibiotics, and further investigative studies such as blood draws and radiology. Pt. showed good  insight into medical condition. All questions answered. Writer recommended she discuss her wishes with her dtr/HCP Joanne. Pt wants resuscitation, dtr knows how far to go. Psychosocial support provided.

## 2022-09-28 LAB
ALBUMIN SERPL ELPH-MCNC: 3.7 G/DL — SIGNIFICANT CHANGE UP (ref 3.3–5)
ALP SERPL-CCNC: 49 U/L — SIGNIFICANT CHANGE UP (ref 30–120)
ALT FLD-CCNC: 22 U/L DA — SIGNIFICANT CHANGE UP (ref 10–60)
ANION GAP SERPL CALC-SCNC: 12 MMOL/L — SIGNIFICANT CHANGE UP (ref 5–17)
AST SERPL-CCNC: 16 U/L — SIGNIFICANT CHANGE UP (ref 10–40)
BASOPHILS # BLD AUTO: 0.01 K/UL — SIGNIFICANT CHANGE UP (ref 0–0.2)
BASOPHILS NFR BLD AUTO: 0.2 % — SIGNIFICANT CHANGE UP (ref 0–2)
BILIRUB SERPL-MCNC: 0.5 MG/DL — SIGNIFICANT CHANGE UP (ref 0.2–1.2)
BUN SERPL-MCNC: 89 MG/DL — HIGH (ref 7–23)
CALCIUM SERPL-MCNC: 9.5 MG/DL — SIGNIFICANT CHANGE UP (ref 8.4–10.5)
CHLORIDE SERPL-SCNC: 95 MMOL/L — LOW (ref 96–108)
CK SERPL-CCNC: 35 U/L — SIGNIFICANT CHANGE UP (ref 26–192)
CO2 SERPL-SCNC: 24 MMOL/L — SIGNIFICANT CHANGE UP (ref 22–31)
CREAT SERPL-MCNC: 2.65 MG/DL — HIGH (ref 0.5–1.3)
EGFR: 19 ML/MIN/1.73M2 — LOW
EOSINOPHIL # BLD AUTO: 0.08 K/UL — SIGNIFICANT CHANGE UP (ref 0–0.5)
EOSINOPHIL NFR BLD AUTO: 1.2 % — SIGNIFICANT CHANGE UP (ref 0–6)
FERRITIN SERPL-MCNC: 335 NG/ML — HIGH (ref 15–150)
GLUCOSE SERPL-MCNC: 93 MG/DL — SIGNIFICANT CHANGE UP (ref 70–99)
HAPTOGLOB SERPL-MCNC: 129 MG/DL — SIGNIFICANT CHANGE UP (ref 34–200)
HAPTOGLOB SERPL-MCNC: 168 MG/DL — SIGNIFICANT CHANGE UP (ref 34–200)
HCT VFR BLD CALC: 34.3 % — LOW (ref 34.5–45)
HGB BLD-MCNC: 11.5 G/DL — SIGNIFICANT CHANGE UP (ref 11.5–15.5)
IMM GRANULOCYTES NFR BLD AUTO: 0.6 % — SIGNIFICANT CHANGE UP (ref 0–0.9)
IRON SATN MFR SERPL: 16 % — SIGNIFICANT CHANGE UP (ref 14–50)
IRON SATN MFR SERPL: 50 UG/DL — SIGNIFICANT CHANGE UP (ref 30–160)
LDH SERPL L TO P-CCNC: 182 U/L — SIGNIFICANT CHANGE UP (ref 50–242)
LYMPHOCYTES # BLD AUTO: 0.9 K/UL — LOW (ref 1–3.3)
LYMPHOCYTES # BLD AUTO: 13.7 % — SIGNIFICANT CHANGE UP (ref 13–44)
MCHC RBC-ENTMCNC: 31.2 PG — SIGNIFICANT CHANGE UP (ref 27–34)
MCHC RBC-ENTMCNC: 33.5 GM/DL — SIGNIFICANT CHANGE UP (ref 32–36)
MCV RBC AUTO: 93 FL — SIGNIFICANT CHANGE UP (ref 80–100)
MONOCYTES # BLD AUTO: 0.76 K/UL — SIGNIFICANT CHANGE UP (ref 0–0.9)
MONOCYTES NFR BLD AUTO: 11.6 % — SIGNIFICANT CHANGE UP (ref 2–14)
NEUTROPHILS # BLD AUTO: 4.77 K/UL — SIGNIFICANT CHANGE UP (ref 1.8–7.4)
NEUTROPHILS NFR BLD AUTO: 72.7 % — SIGNIFICANT CHANGE UP (ref 43–77)
NRBC # BLD: 0 /100 WBCS — SIGNIFICANT CHANGE UP (ref 0–0)
PLATELET # BLD AUTO: 234 K/UL — SIGNIFICANT CHANGE UP (ref 150–400)
POTASSIUM SERPL-MCNC: 3.8 MMOL/L — SIGNIFICANT CHANGE UP (ref 3.5–5.3)
POTASSIUM SERPL-SCNC: 3.8 MMOL/L — SIGNIFICANT CHANGE UP (ref 3.5–5.3)
PROT SERPL-MCNC: 8.2 G/DL — SIGNIFICANT CHANGE UP (ref 6–8.3)
RBC # BLD: 3.69 M/UL — LOW (ref 3.8–5.2)
RBC # FLD: 14.1 % — SIGNIFICANT CHANGE UP (ref 10.3–14.5)
SODIUM SERPL-SCNC: 131 MMOL/L — LOW (ref 135–145)
TIBC SERPL-MCNC: 304 UG/DL — SIGNIFICANT CHANGE UP (ref 220–430)
UIBC SERPL-MCNC: 254 UG/DL — SIGNIFICANT CHANGE UP (ref 110–370)
WBC # BLD: 6.56 K/UL — SIGNIFICANT CHANGE UP (ref 3.8–10.5)
WBC # FLD AUTO: 6.56 K/UL — SIGNIFICANT CHANGE UP (ref 3.8–10.5)

## 2022-09-28 PROCEDURE — 99232 SBSQ HOSP IP/OBS MODERATE 35: CPT

## 2022-09-28 RX ADMIN — Medication 1 PATCH: at 21:31

## 2022-09-28 RX ADMIN — HEPARIN SODIUM 5000 UNIT(S): 5000 INJECTION INTRAVENOUS; SUBCUTANEOUS at 13:36

## 2022-09-28 RX ADMIN — CARVEDILOL PHOSPHATE 12.5 MILLIGRAM(S): 80 CAPSULE, EXTENDED RELEASE ORAL at 05:32

## 2022-09-28 RX ADMIN — HEPARIN SODIUM 5000 UNIT(S): 5000 INJECTION INTRAVENOUS; SUBCUTANEOUS at 05:32

## 2022-09-28 RX ADMIN — HEPARIN SODIUM 5000 UNIT(S): 5000 INJECTION INTRAVENOUS; SUBCUTANEOUS at 21:31

## 2022-09-28 RX ADMIN — AMLODIPINE BESYLATE 5 MILLIGRAM(S): 2.5 TABLET ORAL at 05:31

## 2022-09-28 RX ADMIN — Medication 75 MICROGRAM(S): at 05:31

## 2022-09-28 RX ADMIN — Medication 1 PATCH: at 21:16

## 2022-09-28 RX ADMIN — Medication 1 PATCH: at 19:15

## 2022-09-28 RX ADMIN — Medication 3 MILLILITER(S): at 22:10

## 2022-09-28 RX ADMIN — Medication 1 PATCH: at 07:54

## 2022-09-28 RX ADMIN — CAPTOPRIL 25 MILLIGRAM(S): 12.5 TABLET ORAL at 05:32

## 2022-09-28 RX ADMIN — CARVEDILOL PHOSPHATE 12.5 MILLIGRAM(S): 80 CAPSULE, EXTENDED RELEASE ORAL at 18:20

## 2022-09-28 RX ADMIN — Medication 40 MILLIGRAM(S): at 06:31

## 2022-09-28 NOTE — PROGRESS NOTE ADULT - ASSESSMENT
72F CAD, Lupus, Hypothyroidism, HTN, CKD3, and recent COVID in June 2022 admitted for Acute Hypoxic Respiratory Failure.     Scleroderma Renal Crisis / Acute Renal Failure on CKD 3   Captopril; Have consulted with Nephrology  May need sooner than later renal biopsy  Transfer to [facility] is underway due to patients Rheumatologist and PCP available there    Acute Hypoxic Respiratory Failure  Resolved; CT Chest reviewed. Has Pulmonary Edema and history of CAD; Echo with diastolic Dysfunction and EF 45-50%  Given history of MCTD will need to be concerned for ILD and PAH  Pulmonary and Cardiology Consultation noted     MCTD   Confirmed with her Rheumatologist that she has features of Scleroderma and SLE   Labs and exam concerning for Scleroderma Renal Crisis   Prednisone 10 mg daily    CAD / HTN   BP is low now since we started Captopril; History of PCI  Discontinue Amlodipine and Lasix; Continue Coreg and Captopril   Cardiology and Nephrology Consult noted     Hypothyroidism  Follow up TSH and Free T4  Synthroid increased    Diet  Regular    DVT Prophylaxis  Heparin SC     Disposition   Discharge planning pending hospital course

## 2022-09-28 NOTE — PROGRESS NOTE ADULT - SUBJECTIVE AND OBJECTIVE BOX
Subjective: all anti-hypertensives held due to severe hypotension.       MEDICATIONS  (STANDING):  albuterol/ipratropium for Nebulization 3 milliLiter(s) Nebulizer every 8 hours  captopril 25 milliGRAM(s) Oral daily  carvedilol 12.5 milliGRAM(s) Oral every 12 hours  heparin   Injectable 5000 Unit(s) SubCutaneous every 8 hours  levothyroxine 75 MICROGram(s) Oral daily  nicotine -  14 mG/24Hr(s) Patch 1 Patch Transdermal daily  polyethylene glycol 3350 17 Gram(s) Oral daily  senna 2 Tablet(s) Oral at bedtime    MEDICATIONS  (PRN):  acetaminophen     Tablet .. 650 milliGRAM(s) Oral every 6 hours PRN Temp greater or equal to 38C (100.4F), Mild Pain (1 - 3)  albuterol/ipratropium for Nebulization 3 milliLiter(s) Nebulizer every 3 hours PRN Shortness of Breath and/or Wheezing  labetalol Injectable 10 milliGRAM(s) IV Push every 6 hours PRN Systolic blood pressure > 180  nitroglycerin     SubLingual 0.4 milliGRAM(s) SubLingual every 5 minutes PRN Chest Pain          T(C): 36.8 (09-28-22 @ 16:00), Max: 36.9 (09-28-22 @ 04:59)  HR: 76 (09-28-22 @ 16:00) (66 - 94)  BP: 97/50 (09-28-22 @ 16:00) (84/46 - 173/73)  RR: 16 (09-28-22 @ 16:00) (15 - 30)  SpO2: 100% (09-28-22 @ 16:00) (99% - 100%)  Wt(kg): --        I&O's Detail    27 Sep 2022 07:01  -  28 Sep 2022 07:00  --------------------------------------------------------  IN:    Oral Fluid: 400 mL  Total IN: 400 mL    OUT:    Voided (mL): 1900 mL  Total OUT: 1900 mL    Total NET: -1500 mL               PHYSICAL EXAM:    GENERAL: NAD  NECK: Supple, no inc in JVP  CHEST/LUNG: Clear  HEART: S1S2  ABDOMEN: Soft, Nontender, Nondistended; Bowel sounds present  EXTREMITIES: no edema      LABS:  CBC Full  -  ( 28 Sep 2022 05:50 )  WBC Count : 6.56 K/uL  RBC Count : 3.69 M/uL  Hemoglobin : 11.5 g/dL  Hematocrit : 34.3 %  Platelet Count - Automated : 234 K/uL  Mean Cell Volume : 93.0 fl  Mean Cell Hemoglobin : 31.2 pg  Mean Cell Hemoglobin Concentration : 33.5 gm/dL  Auto Neutrophil # : 4.77 K/uL  Auto Lymphocyte # : 0.90 K/uL  Auto Monocyte # : 0.76 K/uL  Auto Eosinophil # : 0.08 K/uL  Auto Basophil # : 0.01 K/uL  Auto Neutrophil % : 72.7 %  Auto Lymphocyte % : 13.7 %  Auto Monocyte % : 11.6 %  Auto Eosinophil % : 1.2 %  Auto Basophil % : 0.2 %    09-28    131<L>  |  95<L>  |  89<H>  ----------------------------<  93  3.8   |  24  |  2.65<H>    Ca    9.5      28 Sep 2022 05:50    TPro  8.2  /  Alb  3.7  /  TBili  0.5  /  DBili  x   /  AST  16  /  ALT  22  /  AlkPhos  49  09-28            Impression:  * NED -- suspect cardio-renal dysregulation. DDx; critical b/l renal artery dz, scleroderma renal crisis.   * Hypotension due to overzealous tx.   * HyperK -- NED, ACE-I + MRA effect. Improved.   * Acute pulm edema. ? due to valvular dz vs b/l SHILA    Recommendations:   * Keep off diuretic as she is euvolemic  * Cont to watch off Aldactone  * Hold all anti-hypertensives including Captopril due to severe hypotension.   * RA doppler is not available in this institution.   * CTA of RA is relatively contraindicated due to severe NED, high risk of contrast ATN  * Follow Angelo, Renin  * Awaiting transfer to [facility].     Thank you

## 2022-09-28 NOTE — PROGRESS NOTE ADULT - SUBJECTIVE AND OBJECTIVE BOX
Date/Time Patient Seen:  		  Referring MD:   Data Reviewed	       Patient is a 72y old  Female who presents with a chief complaint of SOB (27 Sep 2022 20:35)      Subjective/HPI     PAST MEDICAL & SURGICAL HISTORY:  COPD exacerbation          Medication list         MEDICATIONS  (STANDING):  albuterol/ipratropium for Nebulization 3 milliLiter(s) Nebulizer every 8 hours  amLODIPine   Tablet 5 milliGRAM(s) Oral daily  captopril 25 milliGRAM(s) Oral daily  carvedilol 12.5 milliGRAM(s) Oral every 12 hours  furosemide    Tablet 40 milliGRAM(s) Oral daily  heparin   Injectable 5000 Unit(s) SubCutaneous every 8 hours  levothyroxine 75 MICROGram(s) Oral daily  nicotine -  14 mG/24Hr(s) Patch 1 Patch Transdermal daily  polyethylene glycol 3350 17 Gram(s) Oral daily  senna 2 Tablet(s) Oral at bedtime    MEDICATIONS  (PRN):  acetaminophen     Tablet .. 650 milliGRAM(s) Oral every 6 hours PRN Temp greater or equal to 38C (100.4F), Mild Pain (1 - 3)  albuterol/ipratropium for Nebulization 3 milliLiter(s) Nebulizer every 3 hours PRN Shortness of Breath and/or Wheezing  labetalol Injectable 10 milliGRAM(s) IV Push every 6 hours PRN Systolic blood pressure > 180  nitroglycerin     SubLingual 0.4 milliGRAM(s) SubLingual every 5 minutes PRN Chest Pain         Vitals log        ICU Vital Signs Last 24 Hrs  T(C): 36.9 (28 Sep 2022 04:59), Max: 36.9 (28 Sep 2022 04:59)  T(F): 98.5 (28 Sep 2022 04:59), Max: 98.5 (28 Sep 2022 04:59)  HR: 82 (28 Sep 2022 06:00) (66 - 87)  BP: 135/68 (28 Sep 2022 06:00) (105/65 - 173/73)  BP(mean): 77 (28 Sep 2022 06:00) (64 - 112)  ABP: --  ABP(mean): --  RR: 17 (28 Sep 2022 06:00) (15 - 27)  SpO2: 99% (28 Sep 2022 06:00) (98% - 100%)    O2 Parameters below as of 28 Sep 2022 05:16  Patient On (Oxygen Delivery Method): room air                 Input and Output:  I&O's Detail    26 Sep 2022 07:01  -  27 Sep 2022 07:00  --------------------------------------------------------  IN:    Oral Fluid: 960 mL  Total IN: 960 mL    OUT:    Voided (mL): 1500 mL  Total OUT: 1500 mL    Total NET: -540 mL      27 Sep 2022 07:01  -  28 Sep 2022 06:51  --------------------------------------------------------  IN:    Oral Fluid: 400 mL  Total IN: 400 mL    OUT:    Voided (mL): 1900 mL  Total OUT: 1900 mL    Total NET: -1500 mL          Lab Data                        11.5   6.56  )-----------( 234      ( 28 Sep 2022 05:50 )             34.3     09-27    129<L>  |  96  |  88<H>  ----------------------------<  116<H>  3.5   |  24  |  2.54<H>    Ca    8.8      27 Sep 2022 06:00    TPro  7.9  /  Alb  3.6  /  TBili  0.5  /  DBili  x   /  AST  12  /  ALT  17  /  AlkPhos  48  09-27            Review of Systems	      Objective     Physical Examination  heart s1s2  lung dc BS  abd soft  head nc        Pertinent Lab findings & Imaging      Tish:  NO   Adequate UO     I&O's Detail    26 Sep 2022 07:01  -  27 Sep 2022 07:00  --------------------------------------------------------  IN:    Oral Fluid: 960 mL  Total IN: 960 mL    OUT:    Voided (mL): 1500 mL  Total OUT: 1500 mL    Total NET: -540 mL      27 Sep 2022 07:01  -  28 Sep 2022 06:51  --------------------------------------------------------  IN:    Oral Fluid: 400 mL  Total IN: 400 mL    OUT:    Voided (mL): 1900 mL  Total OUT: 1900 mL    Total NET: -1500 mL               Discussed with:     Cultures:	        Radiology

## 2022-09-28 NOTE — PROGRESS NOTE ADULT - ASSESSMENT
72 y.o. F presents by EMS c/o not feeling well, initial call to EMS was for difficulty breathing, at home family mentioned possible facial droop, pt in significant respiratory distress on presentation to ED    resp distress  CHF  pleural eff  pulm edema  COPD  OP  OA  Ex Smoker  hx of Pulm Nodules    eval for scleroderma renal crisis  poss transfer to tertiary care center  vs noted  labs reviewed  diuresis in progress  rheum input appreciated  clinically appears better      diuresis - cvs rx regimen optimization - cardio eval   I and O  serial labs  replete lytes  monitor VS and HD  card tele and pulse ox monitoring  VBG  NIPPV - prn use - o2 support - wean fio2 as tolerated - keep sat > 88 pct  COPD - Bronchodilators and s/p short course of Systemic Steroids - low dose  CT imaging reviewed - esoph dilation - effusions - atelectasis  effusions - no indication for thoracentesis at present - med rx regimen - card rx   I anastasia as able to cooperate  asp prec  HOB elev  Serial Renal Indices -   GOC discussion DOCUMENTED

## 2022-09-28 NOTE — PROGRESS NOTE ADULT - SUBJECTIVE AND OBJECTIVE BOX
Subjective: Patient seen and examined.  In bed feeling weak and light headed.  BP was low this AM.      MEDICATIONS  (STANDING):  albuterol/ipratropium for Nebulization 3 milliLiter(s) Nebulizer every 8 hours  captopril 25 milliGRAM(s) Oral daily  carvedilol 12.5 milliGRAM(s) Oral every 12 hours  heparin   Injectable 5000 Unit(s) SubCutaneous every 8 hours  levothyroxine 75 MICROGram(s) Oral daily  nicotine -  14 mG/24Hr(s) Patch 1 Patch Transdermal daily  polyethylene glycol 3350 17 Gram(s) Oral daily  senna 2 Tablet(s) Oral at bedtime    MEDICATIONS  (PRN):  acetaminophen     Tablet .. 650 milliGRAM(s) Oral every 6 hours PRN Temp greater or equal to 38C (100.4F), Mild Pain (1 - 3)  albuterol/ipratropium for Nebulization 3 milliLiter(s) Nebulizer every 3 hours PRN Shortness of Breath and/or Wheezing  labetalol Injectable 10 milliGRAM(s) IV Push every 6 hours PRN Systolic blood pressure > 180  nitroglycerin     SubLingual 0.4 milliGRAM(s) SubLingual every 5 minutes PRN Chest Pain      Allergies    hydrALAZINE (Angioedema; Rash)    Intolerances        Vital Signs Last 24 Hrs  T(C): 36.9 (28 Sep 2022 04:59), Max: 36.9 (28 Sep 2022 04:59)  T(F): 98.5 (28 Sep 2022 04:59), Max: 98.5 (28 Sep 2022 04:59)  HR: 73 (28 Sep 2022 10:00) (66 - 94)  BP: 95/54 (28 Sep 2022 10:00) (84/46 - 173/73)  BP(mean): 68 (28 Sep 2022 10:00) (59 - 112)  RR: 21 (28 Sep 2022 10:00) (15 - 30)  SpO2: 100% (28 Sep 2022 10:00) (98% - 100%)    Parameters below as of 28 Sep 2022 10:00  Patient On (Oxygen Delivery Method): room air        PHYSICAL EXAM:  GENERAL: NAD, well-groomed, well-developed  HEAD:  Atraumatic, Normocephalic  ENMT: Moist mucous membranes,   NECK: Supple, No JVD, Normal thyroid  NERVOUS SYSTEM:  All 4 extremities mobile, no gross sensory deficits.   CHEST/LUNG: Clear to auscultation bilaterally; No rales, rhonchi, wheezing, or rubs  HEART: Regular rate and rhythm; No murmurs, rubs, or gallops  ABDOMEN: Soft, Nontender, Nondistended; Bowel sounds present  EXTREMITIES:  2+ Peripheral Pulses, No clubbing, cyanosis, or edema      LABS:                        11.5   6.56  )-----------( 234      ( 28 Sep 2022 05:50 )             34.3     28 Sep 2022 05:50    131    |  95     |  89     ----------------------------<  93     3.8     |  24     |  2.65     Ca    9.5        28 Sep 2022 05:50    TPro  8.2    /  Alb  3.7    /  TBili  0.5    /  DBili  x      /  AST  16     /  ALT  22     /  AlkPhos  49     28 Sep 2022 05:50        CAPILLARY BLOOD GLUCOSE          RADIOLOGY & ADDITIONAL TESTS:    Imaging Personally Reviewed:  [ ] YES     Consultant(s) Notes Reviewed:      Care Discussed with Consultants/Other Providers:    Advanced Directives: [ ] DNR  [ ] No feeding tube  [ ] MOLST in chart  [ ] MOLST completed today  [ ] Unknown  s

## 2022-09-28 NOTE — PROGRESS NOTE ADULT - SUBJECTIVE AND OBJECTIVE BOX
Chief Complaint: Shortness of breath    Interval Events: No events overnight.    Review of Systems:  General: No fevers, chills, weight gain  Skin: No rashes, color changes  Cardiovascular: No chest pain, orthopnea  Respiratory: No shortness of breath, cough  Gastrointestinal: No nausea, abdominal pain  Genitourinary: No incontinence, pain with urination  Musculoskeletal: No pain, swelling, decreased range of motion  Neurological: No headache, weakness  Psychiatric: No depression, anxiety  Endocrine: No weight gain, increased thirst  All other systems are comprehensively negative.    Physical Exam:  Vital Signs Last 24 Hrs  T(C): 36.9 (28 Sep 2022 04:59), Max: 36.9 (28 Sep 2022 04:59)  T(F): 98.5 (28 Sep 2022 04:59), Max: 98.5 (28 Sep 2022 04:59)  HR: 94 (28 Sep 2022 08:00) (66 - 94)  BP: 121/99 (28 Sep 2022 08:00) (111/53 - 173/73)  BP(mean): 107 (28 Sep 2022 08:00) (64 - 112)  RR: 30 (28 Sep 2022 08:00) (15 - 30)  SpO2: 100% (28 Sep 2022 08:00) (98% - 100%)  Parameters below as of 28 Sep 2022 08:00  Patient On (Oxygen Delivery Method): room air  General: NAD  HEENT: MMM  Neck: No JVD, no carotid bruit  Lungs: CTAB  CV: RRR, nl S1/S2, no M/R/G  Abdomen: S/NT/ND, +BS  Extremities: No LE edema, no cyanosis  Neuro: AAOx3, non-focal  Skin: No rash    Labs:    09-28    131<L>  |  95<L>  |  89<H>  ----------------------------<  93  3.8   |  24  |  2.65<H>    Ca    9.5      28 Sep 2022 05:50    TPro  8.2  /  Alb  3.7  /  TBili  0.5  /  DBili  x   /  AST  16  /  ALT  22  /  AlkPhos  49  09-28                        11.5   6.56  )-----------( 234      ( 28 Sep 2022 05:50 )             34.3       Telemetry: Sinus rhythm

## 2022-09-29 DIAGNOSIS — E03.9 HYPOTHYROIDISM, UNSPECIFIED: ICD-10-CM

## 2022-09-29 DIAGNOSIS — M32.10 SYSTEMIC LUPUS ERYTHEMATOSUS, ORGAN OR SYSTEM INVOLVEMENT UNSPECIFIED: ICD-10-CM

## 2022-09-29 DIAGNOSIS — I10 ESSENTIAL (PRIMARY) HYPERTENSION: ICD-10-CM

## 2022-09-29 DIAGNOSIS — I25.10 ATHEROSCLEROTIC HEART DISEASE OF NATIVE CORONARY ARTERY WITHOUT ANGINA PECTORIS: ICD-10-CM

## 2022-09-29 DIAGNOSIS — J96.01 ACUTE RESPIRATORY FAILURE WITH HYPOXIA: ICD-10-CM

## 2022-09-29 DIAGNOSIS — Z87.898 PERSONAL HISTORY OF OTHER SPECIFIED CONDITIONS: ICD-10-CM

## 2022-09-29 DIAGNOSIS — N17.9 ACUTE KIDNEY FAILURE, UNSPECIFIED: ICD-10-CM

## 2022-09-29 LAB
ALBUMIN SERPL ELPH-MCNC: 3.6 G/DL — SIGNIFICANT CHANGE UP (ref 3.3–5)
ALP SERPL-CCNC: 48 U/L — SIGNIFICANT CHANGE UP (ref 30–120)
ALT FLD-CCNC: 24 U/L DA — SIGNIFICANT CHANGE UP (ref 10–60)
ANION GAP SERPL CALC-SCNC: 13 MMOL/L — SIGNIFICANT CHANGE UP (ref 5–17)
AST SERPL-CCNC: 15 U/L — SIGNIFICANT CHANGE UP (ref 10–40)
BILIRUB SERPL-MCNC: 0.4 MG/DL — SIGNIFICANT CHANGE UP (ref 0.2–1.2)
BUN SERPL-MCNC: 91 MG/DL — HIGH (ref 7–23)
CALCIUM SERPL-MCNC: 8.8 MG/DL — SIGNIFICANT CHANGE UP (ref 8.4–10.5)
CHLORIDE SERPL-SCNC: 94 MMOL/L — LOW (ref 96–108)
CO2 SERPL-SCNC: 23 MMOL/L — SIGNIFICANT CHANGE UP (ref 22–31)
CREAT SERPL-MCNC: 2.9 MG/DL — HIGH (ref 0.5–1.3)
EGFR: 17 ML/MIN/1.73M2 — LOW
GLUCOSE SERPL-MCNC: 93 MG/DL — SIGNIFICANT CHANGE UP (ref 70–99)
HCT VFR BLD CALC: 33 % — LOW (ref 34.5–45)
HGB BLD-MCNC: 11 G/DL — LOW (ref 11.5–15.5)
M PROTEIN 24H UR ELPH-MRATE: SIGNIFICANT CHANGE UP
MCHC RBC-ENTMCNC: 31.4 PG — SIGNIFICANT CHANGE UP (ref 27–34)
MCHC RBC-ENTMCNC: 33.3 GM/DL — SIGNIFICANT CHANGE UP (ref 32–36)
MCV RBC AUTO: 94.3 FL — SIGNIFICANT CHANGE UP (ref 80–100)
NRBC # BLD: 0 /100 WBCS — SIGNIFICANT CHANGE UP (ref 0–0)
PLATELET # BLD AUTO: 226 K/UL — SIGNIFICANT CHANGE UP (ref 150–400)
POTASSIUM SERPL-MCNC: 4 MMOL/L — SIGNIFICANT CHANGE UP (ref 3.5–5.3)
POTASSIUM SERPL-SCNC: 4 MMOL/L — SIGNIFICANT CHANGE UP (ref 3.5–5.3)
PROT SERPL-MCNC: 8 G/DL — SIGNIFICANT CHANGE UP (ref 6–8.3)
RBC # BLD: 3.5 M/UL — LOW (ref 3.8–5.2)
RBC # FLD: 14.2 % — SIGNIFICANT CHANGE UP (ref 10.3–14.5)
SODIUM SERPL-SCNC: 130 MMOL/L — LOW (ref 135–145)
WBC # BLD: 7.37 K/UL — SIGNIFICANT CHANGE UP (ref 3.8–10.5)
WBC # FLD AUTO: 7.37 K/UL — SIGNIFICANT CHANGE UP (ref 3.8–10.5)

## 2022-09-29 PROCEDURE — 99223 1ST HOSP IP/OBS HIGH 75: CPT | Mod: AI

## 2022-09-29 PROCEDURE — 99232 SBSQ HOSP IP/OBS MODERATE 35: CPT

## 2022-09-29 RX ORDER — ACETAMINOPHEN 500 MG
650 TABLET ORAL EVERY 6 HOURS
Refills: 0 | Status: DISCONTINUED | OUTPATIENT
Start: 2022-09-29 | End: 2022-10-07

## 2022-09-29 RX ORDER — POLYETHYLENE GLYCOL 3350 17 G/17G
17 POWDER, FOR SOLUTION ORAL DAILY
Refills: 0 | Status: DISCONTINUED | OUTPATIENT
Start: 2022-09-29 | End: 2022-10-07

## 2022-09-29 RX ORDER — ONDANSETRON 8 MG/1
4 TABLET, FILM COATED ORAL ONCE
Refills: 0 | Status: COMPLETED | OUTPATIENT
Start: 2022-09-29 | End: 2022-09-29

## 2022-09-29 RX ORDER — SENNA PLUS 8.6 MG/1
2 TABLET ORAL AT BEDTIME
Refills: 0 | Status: DISCONTINUED | OUTPATIENT
Start: 2022-09-29 | End: 2022-10-07

## 2022-09-29 RX ORDER — LANOLIN ALCOHOL/MO/W.PET/CERES
3 CREAM (GRAM) TOPICAL AT BEDTIME
Refills: 0 | Status: DISCONTINUED | OUTPATIENT
Start: 2022-09-29 | End: 2022-10-07

## 2022-09-29 RX ORDER — ONDANSETRON 8 MG/1
4 TABLET, FILM COATED ORAL EVERY 8 HOURS
Refills: 0 | Status: DISCONTINUED | OUTPATIENT
Start: 2022-09-29 | End: 2022-09-29

## 2022-09-29 RX ORDER — LABETALOL HCL 100 MG
10 TABLET ORAL EVERY 6 HOURS
Refills: 0 | Status: DISCONTINUED | OUTPATIENT
Start: 2022-09-29 | End: 2022-10-01

## 2022-09-29 RX ORDER — POLYETHYLENE GLYCOL 3350 17 G/17G
17 POWDER, FOR SOLUTION ORAL DAILY
Refills: 0 | Status: DISCONTINUED | OUTPATIENT
Start: 2022-09-29 | End: 2022-09-29

## 2022-09-29 RX ORDER — NICOTINE POLACRILEX 2 MG
1 GUM BUCCAL DAILY
Refills: 0 | Status: DISCONTINUED | OUTPATIENT
Start: 2022-09-29 | End: 2022-10-05

## 2022-09-29 RX ORDER — NITROGLYCERIN 6.5 MG
0.4 CAPSULE, EXTENDED RELEASE ORAL
Refills: 0 | Status: DISCONTINUED | OUTPATIENT
Start: 2022-09-29 | End: 2022-10-07

## 2022-09-29 RX ORDER — CARVEDILOL PHOSPHATE 80 MG/1
12.5 CAPSULE, EXTENDED RELEASE ORAL EVERY 12 HOURS
Refills: 0 | Status: DISCONTINUED | OUTPATIENT
Start: 2022-09-29 | End: 2022-10-01

## 2022-09-29 RX ORDER — HEPARIN SODIUM 5000 [USP'U]/ML
5000 INJECTION INTRAVENOUS; SUBCUTANEOUS EVERY 8 HOURS
Refills: 0 | Status: DISCONTINUED | OUTPATIENT
Start: 2022-09-29 | End: 2022-10-03

## 2022-09-29 RX ORDER — LEVOTHYROXINE SODIUM 125 MCG
75 TABLET ORAL DAILY
Refills: 0 | Status: DISCONTINUED | OUTPATIENT
Start: 2022-09-29 | End: 2022-10-07

## 2022-09-29 RX ADMIN — SENNA PLUS 2 TABLET(S): 8.6 TABLET ORAL at 22:25

## 2022-09-29 RX ADMIN — Medication 1 PATCH: at 22:35

## 2022-09-29 RX ADMIN — Medication 3 MILLILITER(S): at 06:37

## 2022-09-29 RX ADMIN — CARVEDILOL PHOSPHATE 12.5 MILLIGRAM(S): 80 CAPSULE, EXTENDED RELEASE ORAL at 05:34

## 2022-09-29 RX ADMIN — CARVEDILOL PHOSPHATE 12.5 MILLIGRAM(S): 80 CAPSULE, EXTENDED RELEASE ORAL at 18:48

## 2022-09-29 RX ADMIN — HEPARIN SODIUM 5000 UNIT(S): 5000 INJECTION INTRAVENOUS; SUBCUTANEOUS at 22:31

## 2022-09-29 RX ADMIN — HEPARIN SODIUM 5000 UNIT(S): 5000 INJECTION INTRAVENOUS; SUBCUTANEOUS at 05:34

## 2022-09-29 RX ADMIN — HEPARIN SODIUM 5000 UNIT(S): 5000 INJECTION INTRAVENOUS; SUBCUTANEOUS at 13:39

## 2022-09-29 RX ADMIN — ONDANSETRON 4 MILLIGRAM(S): 8 TABLET, FILM COATED ORAL at 22:25

## 2022-09-29 RX ADMIN — Medication 75 MICROGRAM(S): at 05:34

## 2022-09-29 RX ADMIN — Medication 1 PATCH: at 07:57

## 2022-09-29 NOTE — PROGRESS NOTE ADULT - ASSESSMENT
72 y.o. F presents by EMS c/o not feeling well, initial call to EMS was for difficulty breathing, at home family mentioned possible facial droop, pt in significant respiratory distress on presentation to ED    resp distress  CHF  pleural eff  pulm edema  COPD  OP  OA  Ex Smoker  hx of Pulm Nodules    eval for scleroderma renal crisis  poss transfer to tertiary care center  vs noted  labs reviewed  cm follow up reviewed      s/p diuresis - cvs rx regimen optimization - cardio eval   I and O  serial labs  replete lytes  monitor VS and HD  card tele and pulse ox monitoring  VBG  NIPPV - prn use - o2 support - wean fio2 as tolerated - keep sat > 88 pct  COPD - Bronchodilators and s/p short course of Systemic Steroids - low dose  CT imaging reviewed - esoph dilation - effusions - atelectasis  effusions - no indication for thoracentesis at present - med rx regimen - card rx   I anastasia as able to cooperate  asp prec  HOB elev  Serial Renal Indices -   GOC discussion DOCUMENTED

## 2022-09-29 NOTE — PROGRESS NOTE ADULT - SUBJECTIVE AND OBJECTIVE BOX
Date/Time Patient Seen:  		  Referring MD:   Data Reviewed	       Patient is a 72y old  Female who presents with a chief complaint of SOB (28 Sep 2022 17:01)      Subjective/HPI     PAST MEDICAL & SURGICAL HISTORY:  COPD exacerbation          Medication list         MEDICATIONS  (STANDING):  albuterol/ipratropium for Nebulization 3 milliLiter(s) Nebulizer every 8 hours  carvedilol 12.5 milliGRAM(s) Oral every 12 hours  heparin   Injectable 5000 Unit(s) SubCutaneous every 8 hours  levothyroxine 75 MICROGram(s) Oral daily  nicotine -  14 mG/24Hr(s) Patch 1 Patch Transdermal daily  polyethylene glycol 3350 17 Gram(s) Oral daily  senna 2 Tablet(s) Oral at bedtime    MEDICATIONS  (PRN):  acetaminophen     Tablet .. 650 milliGRAM(s) Oral every 6 hours PRN Temp greater or equal to 38C (100.4F), Mild Pain (1 - 3)  albuterol/ipratropium for Nebulization 3 milliLiter(s) Nebulizer every 3 hours PRN Shortness of Breath and/or Wheezing  labetalol Injectable 10 milliGRAM(s) IV Push every 6 hours PRN Systolic blood pressure > 180  nitroglycerin     SubLingual 0.4 milliGRAM(s) SubLingual every 5 minutes PRN Chest Pain         Vitals log        ICU Vital Signs Last 24 Hrs  T(C): 36.8 (29 Sep 2022 06:00), Max: 36.8 (28 Sep 2022 16:00)  T(F): 98.2 (29 Sep 2022 06:00), Max: 98.3 (28 Sep 2022 16:00)  HR: 67 (29 Sep 2022 06:41) (58 - 94)  BP: 121/70 (29 Sep 2022 06:00) (84/46 - 145/73)  BP(mean): 83 (29 Sep 2022 06:00) (59 - 107)  ABP: --  ABP(mean): --  RR: 23 (29 Sep 2022 06:00) (15 - 30)  SpO2: 99% (29 Sep 2022 06:41) (98% - 100%)    O2 Parameters below as of 29 Sep 2022 06:41  Patient On (Oxygen Delivery Method): room air                 Input and Output:  I&O's Detail    27 Sep 2022 07:01  -  28 Sep 2022 07:00  --------------------------------------------------------  IN:    Oral Fluid: 400 mL  Total IN: 400 mL    OUT:    Voided (mL): 1900 mL  Total OUT: 1900 mL    Total NET: -1500 mL      28 Sep 2022 07:01  -  29 Sep 2022 06:57  --------------------------------------------------------  IN:  Total IN: 0 mL    OUT:    Voided (mL): 1350 mL  Total OUT: 1350 mL    Total NET: -1350 mL          Lab Data                        11.0   7.37  )-----------( 226      ( 29 Sep 2022 06:45 )             33.0     09-28    131<L>  |  95<L>  |  89<H>  ----------------------------<  93  3.8   |  24  |  2.65<H>    Ca    9.5      28 Sep 2022 05:50    TPro  8.2  /  Alb  3.7  /  TBili  0.5  /  DBili  x   /  AST  16  /  ALT  22  /  AlkPhos  49  09-28      CARDIAC MARKERS ( 28 Sep 2022 05:50 )  x     / x     / 35 U/L / x     / x            Review of Systems	      Objective     Physical Examination    heart s1s2  lung dec BS  head nc      Pertinent Lab findings & Imaging      Tish:  NO   Adequate UO     I&O's Detail    27 Sep 2022 07:01  -  28 Sep 2022 07:00  --------------------------------------------------------  IN:    Oral Fluid: 400 mL  Total IN: 400 mL    OUT:    Voided (mL): 1900 mL  Total OUT: 1900 mL    Total NET: -1500 mL      28 Sep 2022 07:01  -  29 Sep 2022 06:57  --------------------------------------------------------  IN:  Total IN: 0 mL    OUT:    Voided (mL): 1350 mL  Total OUT: 1350 mL    Total NET: -1350 mL               Discussed with:     Cultures:	        Radiology

## 2022-09-29 NOTE — PROGRESS NOTE ADULT - SUBJECTIVE AND OBJECTIVE BOX
Subjective: no dizziness.       MEDICATIONS  (STANDING):  albuterol/ipratropium for Nebulization 3 milliLiter(s) Nebulizer every 8 hours  carvedilol 12.5 milliGRAM(s) Oral every 12 hours  heparin   Injectable 5000 Unit(s) SubCutaneous every 8 hours  levothyroxine 75 MICROGram(s) Oral daily  nicotine -  14 mG/24Hr(s) Patch 1 Patch Transdermal daily  polyethylene glycol 3350 17 Gram(s) Oral daily  senna 2 Tablet(s) Oral at bedtime    MEDICATIONS  (PRN):  acetaminophen     Tablet .. 650 milliGRAM(s) Oral every 6 hours PRN Temp greater or equal to 38C (100.4F), Mild Pain (1 - 3)  albuterol/ipratropium for Nebulization 3 milliLiter(s) Nebulizer every 3 hours PRN Shortness of Breath and/or Wheezing  labetalol Injectable 10 milliGRAM(s) IV Push every 6 hours PRN Systolic blood pressure > 180  nitroglycerin     SubLingual 0.4 milliGRAM(s) SubLingual every 5 minutes PRN Chest Pain          T(C): 36.7 (09-29-22 @ 09:00), Max: 36.8 (09-28-22 @ 16:00)  HR: 67 (09-29-22 @ 10:00) (58 - 82)  BP: 106/62 (09-29-22 @ 08:00) (97/50 - 145/73)  RR: 21 (09-29-22 @ 10:00) (15 - 25)  SpO2: 99% (09-29-22 @ 10:00) (98% - 100%)  Wt(kg): --        I&O's Detail    28 Sep 2022 07:01  -  29 Sep 2022 07:00  --------------------------------------------------------  IN:  Total IN: 0 mL    OUT:    Voided (mL): 1350 mL  Total OUT: 1350 mL    Total NET: -1350 mL               PHYSICAL EXAM:    GENERAL: NAD  NECK: Supple, no inc in JVP  CHEST/LUNG: Clear  HEART: S1S2  ABDOMEN: Soft  EXTREMITIES:  no edema      LABS:  CBC Full  -  ( 29 Sep 2022 06:45 )  WBC Count : 7.37 K/uL  RBC Count : 3.50 M/uL  Hemoglobin : 11.0 g/dL  Hematocrit : 33.0 %  Platelet Count - Automated : 226 K/uL  Mean Cell Volume : 94.3 fl  Mean Cell Hemoglobin : 31.4 pg  Mean Cell Hemoglobin Concentration : 33.3 gm/dL  Auto Neutrophil # : x  Auto Lymphocyte # : x  Auto Monocyte # : x  Auto Eosinophil # : x  Auto Basophil # : x  Auto Neutrophil % : x  Auto Lymphocyte % : x  Auto Monocyte % : x  Auto Eosinophil % : x  Auto Basophil % : x    09-29    130<L>  |  94<L>  |  91<H>  ----------------------------<  93  4.0   |  23  |  2.90<H>    Ca    8.8      29 Sep 2022 06:45    TPro  8.0  /  Alb  3.6  /  TBili  0.4  /  DBili  x   /  AST  15  /  ALT  24  /  AlkPhos  48  09-29        Impression:  * NED -- suspect cardio-renal dysregulation + component of ATN post hypotension yest. DDx; critical b/l renal artery dz, scleroderma renal crisis.   * Hypotension due to overzealous BP lowering  * HyperK -- NED, ACE-I + MRA effect. Improved.   * Acute pulm edema. ? due to valvular dz vs b/l JAYSHREE    Recommendations:   * Keep off diuretic as she is euvolemic  * Cont to watch off Aldactone  * Hold all anti-hypertensives including Captopril due to relative hypotension.   * Keep -160  * RA doppler is not available in this institution.   * CTA of Jayshree is relatively contraindicated due to severe NED, high risk of contrast ATN  * Follow Angelo, Renin  * Awaiting transfer to [facility].     Thank you

## 2022-09-29 NOTE — PROGRESS NOTE ADULT - ASSESSMENT
The patient is a 72 year old female with a history of HTN, HL, hypothyroidism, SLE who presents with shortness of breath.    Plan:  - ECG with nonspecific findings but no evidence of ischemia or infarction  - Troponin mildly elevated at 192 in the setting of demand ischemia from heart failure, NED and respiratory failure. No need to trend further.  - CT chest with mild pulm edema and trace pleural effusions  - Echo with mildly reduced LV systolic function, mod MR, mod AR  - Continue furosemide 40 mg PO daily  - Continue aspirin 81 mg daily  - Continue atorvastatin 20 mg daily (formulary equivalent)  - Continue carvedilol 12.5 mg bid  - On captopril  - Plan now for transfer to tertiary center for possible scleroderma renal crisis

## 2022-09-29 NOTE — PROGRESS NOTE ADULT - SUBJECTIVE AND OBJECTIVE BOX
Chief Complaint: Shortness of breath    Interval Events: No events overnight.    Review of Systems:  General: No fevers, chills, weight gain  Skin: No rashes, color changes  Cardiovascular: No chest pain, orthopnea  Respiratory: No shortness of breath, cough  Gastrointestinal: No nausea, abdominal pain  Genitourinary: No incontinence, pain with urination  Musculoskeletal: No pain, swelling, decreased range of motion  Neurological: No headache, weakness  Psychiatric: No depression, anxiety  Endocrine: No weight gain, increased thirst  All other systems are comprehensively negative.    Physical Exam:  Vital Signs Last 24 Hrs  T(C): 36.9 (29 Sep 2022 12:30), Max: 36.9 (29 Sep 2022 12:30)  T(F): 98.4 (29 Sep 2022 12:30), Max: 98.4 (29 Sep 2022 12:30)  HR: 59 (29 Sep 2022 12:00) (58 - 82)  BP: 122/76 (29 Sep 2022 12:00) (97/50 - 145/73)  BP(mean): 91 (29 Sep 2022 12:00) (65 - 93)  RR: 20 (29 Sep 2022 12:00) (15 - 25)  SpO2: 99% (29 Sep 2022 12:00) (98% - 100%)  Parameters below as of 29 Sep 2022 12:00  Patient On (Oxygen Delivery Method): room air  General: NAD  HEENT: MMM  Neck: No JVD, no carotid bruit  Lungs: CTAB  CV: RRR, nl S1/S2, no M/R/G  Abdomen: S/NT/ND, +BS  Extremities: No LE edema, no cyanosis  Neuro: AAOx3, non-focal  Skin: No rash    Labs:    09-29    130<L>  |  94<L>  |  91<H>  ----------------------------<  93  4.0   |  23  |  2.90<H>    Ca    8.8      29 Sep 2022 06:45    TPro  8.0  /  Alb  3.6  /  TBili  0.4  /  DBili  x   /  AST  15  /  ALT  24  /  AlkPhos  48  09-29                        11.0   7.37  )-----------( 226      ( 29 Sep 2022 06:45 )             33.0       Telemetry: Sinus rhythm

## 2022-09-29 NOTE — CHART NOTE - NSCHARTNOTEFT_GEN_A_CORE
MICU Accept Note    CHIEF COMPLAINT:     HPI:  72F CAD, Lupus, RA, Hypothyroidism, HTN, CKD3, and recent COVID in June 2022 admitted to OSH for AHRF found to have Sclerodermal crisis, transferred to [facility] for rheum eval. At OSH, pt was placed on BIPAP for hypoxia and eventually de-escalated to RA.  Routine labs done showing elevated BNP and Creatinine. CT Chest with pulmonary edema, s/p Lasix.    INTERVAL HISTORY:    PAST MEDICAL & SURGICAL HISTORY:  COPD exacerbation        FAMILY HISTORY:  Daughter - SLE  Daughter - Hyperthyroidism    Social History:  Smokes 1 Pack Per Day; Negative for EtOH and IVDA (22 Sep 2022 12:17)      HOME MEDICATIONS:  Home Medications:  amLODIPine 5 mg oral tablet: 1 tab(s) orally once a day (26 Sep 2022 16:00)  aspirin 81 mg oral tablet, chewable: 1 tab(s) orally once a day (22 Sep 2022 09:36)  captopril 12.5 mg oral tablet: 1 tab(s) orally once a day (26 Sep 2022 16:00)  carvedilol 12.5 mg oral tablet: 1 tab(s) orally every 12 hours (26 Sep 2022 16:00)  Crestor 10 mg oral tablet: 1 tab(s) orally once a day (22 Sep 2022 09:36)  furosemide 40 mg oral tablet: 1 tab(s) orally once a day (26 Sep 2022 16:00)  levothyroxine 75 mcg (0.075 mg) oral tablet: 1 tab(s) orally once a day (26 Sep 2022 16:00)  predniSONE 10 mg oral tablet: 1 tab(s) orally once a day (26 Sep 2022 16:00)  Prevacid 30 mg oral delayed release capsule: 1 cap(s) orally once a day (22 Sep 2022 09:36)      Allergies    hydrALAZINE (Angioedema; Rash)    Intolerances        REVIEW OF SYSTEMS:  Constitutional: No fevers, chills, weight loss, weight gain  HEENT: No vision problems, eye pain, nasal congestion, rhinorrhea, sore throat, dysphagia  CV: No chest pain, orthopnea, palpitations  Resp: No cough, dyspnea, wheezing, hemoptysis  GI: No nausea, vomiting, diarrhea, constipation, abdominal pain  : [ ] dysuria [ ] nocturia [ ] hematuria [ ] increased urinary frequency  Musculoskeletal: [ ] back pain [ ] myalgias [ ] arthralgias [ ] fracture  Skin: [ ] rash [ ] itch  Neurological: [ ] headache [ ] dizziness [ ] syncope [ ] weakness [ ] numbness  Psychiatric: [ ] anxiety [ ] depression  Endocrine: [ ] diabetes [ ] thyroid problem  Hematologic/Lymphatic: [ ] anemia [ ] bleeding problem  Allergic/Immunologic: [ ] itchy eyes [ ] nasal discharge [ ] hives [ ] angioedema  [ ] All other systems negative  [ ] Unable to assess ROS because ________    OBJECTIVE:  ICU Vital Signs Last 24 Hrs  T(C): 36.7 (29 Sep 2022 16:49), Max: 36.9 (29 Sep 2022 12:30)  T(F): 98.1 (29 Sep 2022 16:49), Max: 98.4 (29 Sep 2022 12:30)  HR: 70 (29 Sep 2022 16:49) (58 - 78)  BP: 145/79 (29 Sep 2022 16:49) (99/52 - 145/79)  BP(mean): 91 (29 Sep 2022 12:00) (67 - 93)  ABP: --  ABP(mean): --  RR: 18 (29 Sep 2022 16:49) (15 - 25)  SpO2: 97% (29 Sep 2022 16:49) (97% - 100%)    O2 Parameters below as of 29 Sep 2022 16:49  Patient On (Oxygen Delivery Method): room air              09-28 @ 07:01  -  09-29 @ 07:00  --------------------------------------------------------  IN: 0 mL / OUT: 1350 mL / NET: -1350 mL      CAPILLARY BLOOD GLUCOSE          PHYSICAL EXAM:  GENERAL: NAD, well-developed  HEAD:  Atraumatic, Normocephalic  EYES: EOMI, PERRLA, conjunctiva and sclera clear  NECK: Supple  CHEST/LUNG: Clear to auscultation bilaterally; No wheeze  HEART: Regular rate and rhythm; systolic murmur heard throughout  ABDOMEN: Soft, Nontender, Nondistended; Bowel sounds present  EXTREMITIES:  2+ Peripheral Pulses, No clubbing, cyanosis, or edema  PSYCH: AAOx3, appropriate affect  NEUROLOGY: non-focal, smith  SKIN: No rashes or lesions    LINES:     HOSPITAL MEDICATIONS:  MEDICATIONS  (STANDING):  carvedilol 12.5 milliGRAM(s) Oral every 12 hours  levothyroxine 75 MICROGram(s) Oral daily  nicotine -  14 mG/24Hr(s) Patch 1 Patch Transdermal daily  polyethylene glycol 3350 17 Gram(s) Oral daily  senna 2 Tablet(s) Oral at bedtime    MEDICATIONS  (PRN):  acetaminophen     Tablet .. 650 milliGRAM(s) Oral every 6 hours PRN Temp greater or equal to 38C (100.4F), Mild Pain (1 - 3)  labetalol Injectable 10 milliGRAM(s) IV Push every 6 hours PRN Systolic blood pressure > 180  melatonin 3 milliGRAM(s) Oral at bedtime PRN Insomnia  nitroglycerin     SubLingual 0.4 milliGRAM(s) SubLingual every 5 minutes PRN Chest Pain      LABS:                        11.0   7.37  )-----------( 226      ( 29 Sep 2022 06:45 )             33.0     09-29    130<L>  |  94<L>  |  91<H>  ----------------------------<  93  4.0   |  23  |  2.90<H>    Ca    8.8      29 Sep 2022 06:45    TPro  8.0  /  Alb  3.6  /  TBili  0.4  /  DBili  x   /  AST  15  /  ALT  24  /  AlkPhos  48  09-29              CARDIAC MARKERS ( 28 Sep 2022 05:50 )  x     / x     / 35 U/L / x     / x          CAPILLARY BLOOD GLUCOSE          RADIOLOGY & ADDITIONAL TESTS:    ASSESSMENT/PLAN:  72F CAD, Lupus, Hypothyroidism, h/o MCTD with Raynaud's and digital cold sensitivity, HTN, CKD3, valvular heart disease, and recent COVID in June 2022 admitted to OSH for AHRF, now with c/f scleroderma renal crisis transferred to [facility] for rheum eval, renal bx.    #Scleroderma Renal Crisis / Acute Renal Failure on CKD 3   - hold Captopril for now iso relative HoTN  - Nephrology c/s in AM - ?renal bx  - rheum c/s in AM  - f/u US duplex kidneys    #Acute Hypoxic Respiratory Failure  - Resolved, now on RA  - CT Chest reviewed with Pulmonary Edema   - Echo with diastolic Dysfunction and EF 45-50%  - Given history of MCTD, c/f ILD and PAH  - Was followed by Pulmonary and Cardiology at OSH    #MCTD   - Confirmed with her Rheumatologist that she has features of Scleroderma and SLE   - Labs and exam concerning for Scleroderma Renal Crisis   - c/w Prednisone 10 mg daily  - rheum c/s as above    #CAD  - History of PCI   - holding Captopril iso relative HoTN  - c/w Coreg     #HTN   - hold Captopril iso relative hypotension    #Hypothyroidism  - c/w Synthroid     PPx measure  - Diet: Regular  - DVT Prophylaxis: Heparin SC   - Disposition: pending hospital course       # ID    # ENDO    DVT HPI:  72F CAD, Lupus, RA, Hypothyroidism, HTN, CKD3, and recent COVID in June 2022 admitted to OSH for AHRF found to have Sclerodermal crisis, transferred to [facility] for rheum eval. At OSH, pt was placed on BIPAP for hypoxia and eventually de-escalated to RA.  Routine labs done showing elevated BNP and Creatinine. CT Chest with pulmonary edema, s/p Lasix.    INTERVAL HISTORY:    PAST MEDICAL & SURGICAL HISTORY:  COPD exacerbation        FAMILY HISTORY:  Daughter - SLE  Daughter - Hyperthyroidism    Social History:  Smokes 1 Pack Per Day; Negative for EtOH and IVDA (22 Sep 2022 12:17)      HOME MEDICATIONS:  Home Medications:  amLODIPine 5 mg oral tablet: 1 tab(s) orally once a day (26 Sep 2022 16:00)  aspirin 81 mg oral tablet, chewable: 1 tab(s) orally once a day (22 Sep 2022 09:36)  captopril 12.5 mg oral tablet: 1 tab(s) orally once a day (26 Sep 2022 16:00)  carvedilol 12.5 mg oral tablet: 1 tab(s) orally every 12 hours (26 Sep 2022 16:00)  Crestor 10 mg oral tablet: 1 tab(s) orally once a day (22 Sep 2022 09:36)  furosemide 40 mg oral tablet: 1 tab(s) orally once a day (26 Sep 2022 16:00)  levothyroxine 75 mcg (0.075 mg) oral tablet: 1 tab(s) orally once a day (26 Sep 2022 16:00)  predniSONE 10 mg oral tablet: 1 tab(s) orally once a day (26 Sep 2022 16:00)  Prevacid 30 mg oral delayed release capsule: 1 cap(s) orally once a day (22 Sep 2022 09:36)      Allergies    hydrALAZINE (Angioedema; Rash)    Intolerances        REVIEW OF SYSTEMS:  Constitutional: No fevers, chills, weight loss, weight gain  HEENT: No vision problems, eye pain, nasal congestion, rhinorrhea, sore throat, dysphagia  CV: No chest pain, orthopnea, palpitations  Resp: No cough, dyspnea, wheezing, hemoptysis  GI: No nausea, vomiting, diarrhea, constipation, abdominal pain  : [ ] dysuria [ ] nocturia [ ] hematuria [ ] increased urinary frequency  Musculoskeletal: [ ] back pain [ ] myalgias [ ] arthralgias [ ] fracture  Skin: [ ] rash [ ] itch  Neurological: [ ] headache [ ] dizziness [ ] syncope [ ] weakness [ ] numbness  Psychiatric: [ ] anxiety [ ] depression  Endocrine: [ ] diabetes [ ] thyroid problem  Hematologic/Lymphatic: [ ] anemia [ ] bleeding problem  Allergic/Immunologic: [ ] itchy eyes [ ] nasal discharge [ ] hives [ ] angioedema  [ ] All other systems negative  [ ] Unable to assess ROS because ________    OBJECTIVE:  ICU Vital Signs Last 24 Hrs  T(C): 36.7 (29 Sep 2022 16:49), Max: 36.9 (29 Sep 2022 12:30)  T(F): 98.1 (29 Sep 2022 16:49), Max: 98.4 (29 Sep 2022 12:30)  HR: 70 (29 Sep 2022 16:49) (58 - 78)  BP: 145/79 (29 Sep 2022 16:49) (99/52 - 145/79)  BP(mean): 91 (29 Sep 2022 12:00) (67 - 93)  ABP: --  ABP(mean): --  RR: 18 (29 Sep 2022 16:49) (15 - 25)  SpO2: 97% (29 Sep 2022 16:49) (97% - 100%)    O2 Parameters below as of 29 Sep 2022 16:49  Patient On (Oxygen Delivery Method): room air              09-28 @ 07:01  -  09-29 @ 07:00  --------------------------------------------------------  IN: 0 mL / OUT: 1350 mL / NET: -1350 mL      CAPILLARY BLOOD GLUCOSE          PHYSICAL EXAM:  GENERAL: NAD, well-developed  HEAD:  Atraumatic, Normocephalic  EYES: EOMI, PERRLA, conjunctiva and sclera clear  NECK: Supple  CHEST/LUNG: Clear to auscultation bilaterally; No wheeze  HEART: Regular rate and rhythm; systolic murmur heard throughout  ABDOMEN: Soft, Nontender, Nondistended; Bowel sounds present  EXTREMITIES:  2+ Peripheral Pulses, No clubbing, cyanosis, or edema  PSYCH: AAOx3, appropriate affect  NEUROLOGY: non-focal, smith  SKIN: No rashes or lesions    LINES:     HOSPITAL MEDICATIONS:  MEDICATIONS  (STANDING):  carvedilol 12.5 milliGRAM(s) Oral every 12 hours  levothyroxine 75 MICROGram(s) Oral daily  nicotine -  14 mG/24Hr(s) Patch 1 Patch Transdermal daily  polyethylene glycol 3350 17 Gram(s) Oral daily  senna 2 Tablet(s) Oral at bedtime    MEDICATIONS  (PRN):  acetaminophen     Tablet .. 650 milliGRAM(s) Oral every 6 hours PRN Temp greater or equal to 38C (100.4F), Mild Pain (1 - 3)  labetalol Injectable 10 milliGRAM(s) IV Push every 6 hours PRN Systolic blood pressure > 180  melatonin 3 milliGRAM(s) Oral at bedtime PRN Insomnia  nitroglycerin     SubLingual 0.4 milliGRAM(s) SubLingual every 5 minutes PRN Chest Pain      LABS:                        11.0   7.37  )-----------( 226      ( 29 Sep 2022 06:45 )             33.0     09-29    130<L>  |  94<L>  |  91<H>  ----------------------------<  93  4.0   |  23  |  2.90<H>    Ca    8.8      29 Sep 2022 06:45    TPro  8.0  /  Alb  3.6  /  TBili  0.4  /  DBili  x   /  AST  15  /  ALT  24  /  AlkPhos  48  09-29              CARDIAC MARKERS ( 28 Sep 2022 05:50 )  x     / x     / 35 U/L / x     / x          CAPILLARY BLOOD GLUCOSE          RADIOLOGY & ADDITIONAL TESTS:    ASSESSMENT/PLAN:  72F CAD, Lupus, Hypothyroidism, h/o MCTD with Raynaud's and digital cold sensitivity, HTN, CKD3, valvular heart disease, and recent COVID in June 2022 admitted to OSH for AHRF, now with c/f scleroderma renal crisis transferred to [facility] for rheum eval, renal bx.    #Scleroderma Renal Crisis / Acute Renal Failure on CKD 3   - hold Captopril for now iso relative HoTN  - Nephrology c/s in AM - ?renal bx  - rheum c/s in AM  - f/u US duplex kidneys    #Acute Hypoxic Respiratory Failure  - Resolved, now on RA  - CT Chest reviewed with Pulmonary Edema   - Echo with diastolic Dysfunction and EF 45-50%  - Given history of MCTD, c/f ILD and PAH  - Was followed by Pulmonary and Cardiology at OSH    #MCTD   - Confirmed with her Rheumatologist that she has features of Scleroderma and SLE   - Labs and exam concerning for Scleroderma Renal Crisis   - c/w Prednisone 10 mg daily  - rheum c/s as above    #CAD  - History of PCI   - holding Captopril iso relative HoTN  - c/w Coreg     #HTN   - hold Captopril iso relative hypotension    #Hypothyroidism  - c/w Synthroid     PPx measure  - Diet: DASH  - DVT Prophylaxis: Heparin SC   - Disposition: pending hospital course HPI:  72F CAD, Lupus, RA, Hypothyroidism, HTN, CKD3, and recent COVID in June 2022 admitted to OSH for AHRF found to have Sclerodermal crisis, transferred to [facility] for rheum eval. At OSH, pt was placed on BIPAP for hypoxia and eventually de-escalated to RA.  Routine labs done showing elevated BNP and Creatinine. CT Chest with pulmonary edema, s/p Lasix.    INTERVAL HISTORY:    PAST MEDICAL & SURGICAL HISTORY:  COPD exacerbation        FAMILY HISTORY:  Daughter - SLE  Daughter - Hyperthyroidism    Social History:  Smokes 1 Pack Per Day; Negative for EtOH and IVDA (22 Sep 2022 12:17)      HOME MEDICATIONS:  Home Medications:  amLODIPine 5 mg oral tablet: 1 tab(s) orally once a day (26 Sep 2022 16:00)  aspirin 81 mg oral tablet, chewable: 1 tab(s) orally once a day (22 Sep 2022 09:36)  captopril 12.5 mg oral tablet: 1 tab(s) orally once a day (26 Sep 2022 16:00)  carvedilol 12.5 mg oral tablet: 1 tab(s) orally every 12 hours (26 Sep 2022 16:00)  Crestor 10 mg oral tablet: 1 tab(s) orally once a day (22 Sep 2022 09:36)  furosemide 40 mg oral tablet: 1 tab(s) orally once a day (26 Sep 2022 16:00)  levothyroxine 75 mcg (0.075 mg) oral tablet: 1 tab(s) orally once a day (26 Sep 2022 16:00)  predniSONE 10 mg oral tablet: 1 tab(s) orally once a day (26 Sep 2022 16:00)  Prevacid 30 mg oral delayed release capsule: 1 cap(s) orally once a day (22 Sep 2022 09:36)      Allergies    hydrALAZINE (Angioedema; Rash)    Intolerances        REVIEW OF SYSTEMS:  Constitutional: No fevers, chills, weight loss, weight gain  HEENT: No vision problems, eye pain, nasal congestion, rhinorrhea, sore throat, dysphagia  CV: No chest pain, orthopnea, palpitations  Resp: No cough, dyspnea, wheezing, hemoptysis  GI: No nausea, vomiting, diarrhea, constipation, abdominal pain  : [ ] dysuria [ ] nocturia [ ] hematuria [ ] increased urinary frequency  Musculoskeletal: [ ] back pain [ ] myalgias [ ] arthralgias [ ] fracture  Skin: [ ] rash [ ] itch  Neurological: [ ] headache [ ] dizziness [ ] syncope [ ] weakness [ ] numbness  Psychiatric: [ ] anxiety [ ] depression  Endocrine: [ ] diabetes [ ] thyroid problem  Hematologic/Lymphatic: [ ] anemia [ ] bleeding problem  Allergic/Immunologic: [ ] itchy eyes [ ] nasal discharge [ ] hives [ ] angioedema  [ ] All other systems negative  [ ] Unable to assess ROS because ________    OBJECTIVE:  ICU Vital Signs Last 24 Hrs  T(C): 36.7 (29 Sep 2022 16:49), Max: 36.9 (29 Sep 2022 12:30)  T(F): 98.1 (29 Sep 2022 16:49), Max: 98.4 (29 Sep 2022 12:30)  HR: 70 (29 Sep 2022 16:49) (58 - 78)  BP: 145/79 (29 Sep 2022 16:49) (99/52 - 145/79)  BP(mean): 91 (29 Sep 2022 12:00) (67 - 93)  ABP: --  ABP(mean): --  RR: 18 (29 Sep 2022 16:49) (15 - 25)  SpO2: 97% (29 Sep 2022 16:49) (97% - 100%)    O2 Parameters below as of 29 Sep 2022 16:49  Patient On (Oxygen Delivery Method): room air              09-28 @ 07:01  -  09-29 @ 07:00  --------------------------------------------------------  IN: 0 mL / OUT: 1350 mL / NET: -1350 mL      CAPILLARY BLOOD GLUCOSE          PHYSICAL EXAM:  GENERAL: NAD, well-developed  HEAD:  Atraumatic, Normocephalic  EYES: EOMI, PERRLA, conjunctiva and sclera clear  NECK: Supple  CHEST/LUNG: Clear to auscultation bilaterally; No wheezes, rhonchi   HEART: Regular rate and rhythm; Normal S1, S2,   ABDOMEN: Soft, Nontender, Nondistended; Bowel sounds present, Lower back is tender to palpation on the Left side.   EXTREMITIES:  2+ Peripheral Pulses, No clubbing, cyanosis, or edema  PSYCH: AAOx3, appropriate affect  NEUROLOGY: non-focal, smith  SKIN: No rashes or lesions    LINES:     HOSPITAL MEDICATIONS:  MEDICATIONS  (STANDING):  carvedilol 12.5 milliGRAM(s) Oral every 12 hours  levothyroxine 75 MICROGram(s) Oral daily  nicotine -  14 mG/24Hr(s) Patch 1 Patch Transdermal daily  polyethylene glycol 3350 17 Gram(s) Oral daily  senna 2 Tablet(s) Oral at bedtime    MEDICATIONS  (PRN):  acetaminophen     Tablet .. 650 milliGRAM(s) Oral every 6 hours PRN Temp greater or equal to 38C (100.4F), Mild Pain (1 - 3)  labetalol Injectable 10 milliGRAM(s) IV Push every 6 hours PRN Systolic blood pressure > 180  melatonin 3 milliGRAM(s) Oral at bedtime PRN Insomnia  nitroglycerin     SubLingual 0.4 milliGRAM(s) SubLingual every 5 minutes PRN Chest Pain      LABS:                        11.0   7.37  )-----------( 226      ( 29 Sep 2022 06:45 )             33.0     09-29    130<L>  |  94<L>  |  91<H>  ----------------------------<  93  4.0   |  23  |  2.90<H>    Ca    8.8      29 Sep 2022 06:45    TPro  8.0  /  Alb  3.6  /  TBili  0.4  /  DBili  x   /  AST  15  /  ALT  24  /  AlkPhos  48  09-29              CARDIAC MARKERS ( 28 Sep 2022 05:50 )  x     / x     / 35 U/L / x     / x          CAPILLARY BLOOD GLUCOSE          RADIOLOGY & ADDITIONAL TESTS:    ASSESSMENT/PLAN:  72F CAD, Lupus, Hypothyroidism, h/o MCTD with Raynaud's and digital cold sensitivity, HTN, CKD3, valvular heart disease, and recent COVID in June 2022 admitted to OSH for AHRF, now with c/f scleroderma renal crisis transferred to [facility] for rheum eval, renal bx.    #Scleroderma Renal Crisis / Acute Renal Failure on CKD 3   - hold Captopril for now iso relative HoTN  - Nephrology c/s in AM - ?renal bx  - rheum c/s in AM  - f/u US duplex kidneys    #Acute Hypoxic Respiratory Failure  - Resolved, now on RA  - CT Chest reviewed with Pulmonary Edema   - Echo with diastolic Dysfunction and EF 45-50%  - Given history of MCTD, c/f ILD and PAH  - Was followed by Pulmonary and Cardiology at OSH    #MCTD   - Confirmed with her Rheumatologist that she has features of Scleroderma and SLE   - Labs and exam concerning for Scleroderma Renal Crisis   - c/w Prednisone 10 mg daily  - rheum c/s as above    #CAD  - History of PCI   - holding Captopril iso relative HoTN  - c/w Coreg     #HTN   - hold Captopril iso relative hypotension    #Hypothyroidism  - c/w Synthroid     PPx measure  - Diet: DASH  - DVT Prophylaxis: Heparin SC   - Disposition: pending hospital course HPI:  72F CAD, Lupus, RA, Hypothyroidism, HTN, CKD3, and recent COVID in June 2022 admitted to OSH for AHRF found to have Sclerodermal crisis, transferred to [facility] for rheum eval. At OSH, pt was placed on BIPAP for hypoxia and eventually de-escalated to RA.  Routine labs done showing elevated BNP and Creatinine. CT Chest with pulmonary edema, s/p Lasix.    INTERVAL HISTORY:    PAST MEDICAL & SURGICAL HISTORY:  COPD exacerbation        FAMILY HISTORY:  Daughter - SLE  Daughter - Hyperthyroidism    Social History:  Smokes 1 Pack Per Day; Negative for EtOH and IVDA (22 Sep 2022 12:17)      HOME MEDICATIONS:  Home Medications:  amLODIPine 5 mg oral tablet: 1 tab(s) orally once a day (26 Sep 2022 16:00)  aspirin 81 mg oral tablet, chewable: 1 tab(s) orally once a day (22 Sep 2022 09:36)  captopril 12.5 mg oral tablet: 1 tab(s) orally once a day (26 Sep 2022 16:00)  carvedilol 12.5 mg oral tablet: 1 tab(s) orally every 12 hours (26 Sep 2022 16:00)  Crestor 10 mg oral tablet: 1 tab(s) orally once a day (22 Sep 2022 09:36)  furosemide 40 mg oral tablet: 1 tab(s) orally once a day (26 Sep 2022 16:00)  levothyroxine 75 mcg (0.075 mg) oral tablet: 1 tab(s) orally once a day (26 Sep 2022 16:00)  predniSONE 10 mg oral tablet: 1 tab(s) orally once a day (26 Sep 2022 16:00)  Prevacid 30 mg oral delayed release capsule: 1 cap(s) orally once a day (22 Sep 2022 09:36)      Allergies    hydrALAZINE (Angioedema; Rash)    Intolerances        REVIEW OF SYSTEMS:  Constitutional: No fevers, chills, weight loss, weight gain  HEENT: No vision problems, eye pain, nasal congestion, rhinorrhea, sore throat, dysphagia  CV: No chest pain, orthopnea, palpitations  Resp: No cough, dyspnea, wheezing, hemoptysis  GI: +nausea, no vomiting, diarrhea, constipation, abdominal pain  :  No dysuria, nocturia, hematuria, urinary frequency   Musculoskeletal: +lower back pain, no arthalgias, no fractures   Skin: no rashes, itchiness, cyanosis   Neurological: no headache, dizziness, syncope,  Psychiatric: + anxiety, no depression, no mood swings   Endocrine: + thyroid issues, no cold or cold intolerance,   Hematologic/Lymphatic: no history of bleeding problems, no easy bruising, no history of prolonged bleeding with surgeries or trauma.   Allergic/Immunologic: No itchy eyes, congestion, hives, rhinorrhea,       OBJECTIVE:  ICU Vital Signs Last 24 Hrs  T(C): 36.7 (29 Sep 2022 16:49), Max: 36.9 (29 Sep 2022 12:30)  T(F): 98.1 (29 Sep 2022 16:49), Max: 98.4 (29 Sep 2022 12:30)  HR: 70 (29 Sep 2022 16:49) (58 - 78)  BP: 145/79 (29 Sep 2022 16:49) (99/52 - 145/79)  BP(mean): 91 (29 Sep 2022 12:00) (67 - 93)  ABP: --  ABP(mean): --  RR: 18 (29 Sep 2022 16:49) (15 - 25)  SpO2: 97% (29 Sep 2022 16:49) (97% - 100%)    O2 Parameters below as of 29 Sep 2022 16:49  Patient On (Oxygen Delivery Method): room air              09-28 @ 07:01  -  09-29 @ 07:00  --------------------------------------------------------  IN: 0 mL / OUT: 1350 mL / NET: -1350 mL      CAPILLARY BLOOD GLUCOSE          PHYSICAL EXAM:  GENERAL: NAD, well-developed  HEAD:  Atraumatic, Normocephalic  EYES: EOMI, PERRLA, conjunctiva and sclera clear  NECK: Supple  CHEST/LUNG: Clear to auscultation bilaterally; No wheezes, rhonchi   HEART: Regular rate and rhythm; Normal S1, S2,   ABDOMEN: Soft, Nontender, Nondistended; Bowel sounds present, Lower back is tender to palpation on the Left side.   EXTREMITIES:  2+ Peripheral Pulses, No clubbing, cyanosis, or edema  PSYCH: AAOx3, appropriate affect  NEUROLOGY: non-focal, smith  SKIN: No rashes or lesions    LINES:     HOSPITAL MEDICATIONS:  MEDICATIONS  (STANDING):  carvedilol 12.5 milliGRAM(s) Oral every 12 hours  levothyroxine 75 MICROGram(s) Oral daily  nicotine -  14 mG/24Hr(s) Patch 1 Patch Transdermal daily  polyethylene glycol 3350 17 Gram(s) Oral daily  senna 2 Tablet(s) Oral at bedtime    MEDICATIONS  (PRN):  acetaminophen     Tablet .. 650 milliGRAM(s) Oral every 6 hours PRN Temp greater or equal to 38C (100.4F), Mild Pain (1 - 3)  labetalol Injectable 10 milliGRAM(s) IV Push every 6 hours PRN Systolic blood pressure > 180  melatonin 3 milliGRAM(s) Oral at bedtime PRN Insomnia  nitroglycerin     SubLingual 0.4 milliGRAM(s) SubLingual every 5 minutes PRN Chest Pain      LABS:                        11.0   7.37  )-----------( 226      ( 29 Sep 2022 06:45 )             33.0     09-29    130<L>  |  94<L>  |  91<H>  ----------------------------<  93  4.0   |  23  |  2.90<H>    Ca    8.8      29 Sep 2022 06:45    TPro  8.0  /  Alb  3.6  /  TBili  0.4  /  DBili  x   /  AST  15  /  ALT  24  /  AlkPhos  48  09-29              CARDIAC MARKERS ( 28 Sep 2022 05:50 )  x     / x     / 35 U/L / x     / x          CAPILLARY BLOOD GLUCOSE          RADIOLOGY & ADDITIONAL TESTS:    ASSESSMENT/PLAN:  72F CAD, Lupus, Hypothyroidism, h/o MCTD with Raynaud's and digital cold sensitivity, HTN, CKD3, valvular heart disease, and recent COVID in June 2022 admitted to OSH for AHRF, now with c/f scleroderma renal crisis transferred to [facility] for rheum eval, renal bx.    #Scleroderma Renal Crisis / Acute Renal Failure on CKD 3   - hold Captopril for now iso relative HoTN  - Nephrology c/s in AM - ?renal bx  - rheum c/s in AM  - f/u US duplex kidneys    #Acute Hypoxic Respiratory Failure  - Resolved, now on RA  - CT Chest reviewed with Pulmonary Edema   - Echo with diastolic Dysfunction and EF 45-50%  - Given history of MCTD, c/f ILD and PAH  - Was followed by Pulmonary and Cardiology at OSH    #MCTD   - Confirmed with her Rheumatologist that she has features of Scleroderma and SLE   - Labs and exam concerning for Scleroderma Renal Crisis   - c/w Prednisone 10 mg daily  - rheum c/s as above    #CAD  - History of PCI   - holding Captopril iso relative HoTN  - c/w Coreg     #HTN   - hold Captopril iso relative hypotension    #Hypothyroidism  - c/w Synthroid     PPx measure  - Diet: DASH  - DVT Prophylaxis: Heparin SC   - Disposition: pending hospital course HPI:  72F CAD, Lupus, MCTD, raynauds, Hypothyroidism, HTN, CKD3, and recent COVID in June 2022 admitted to [facility] Hospital for acute hypoxemic respiratory failure with worsening DEMPSEY. She was in her usual state of health when she for the last few days her dyspnea progressively worsened. She required bipap initially and was later de-escalated to nasal canula. At OSH, pt was placed on BIPAP for hypoxia and eventually de-escalated to RA. She has a hx of lupus and was being followed by a rheumatologist as outpatient. 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 Renal was consulted for eval of NED with concern for b/l renal artery disease vs scleroderma renal crisis. She was transferred to [facility] for further evaluation.    REVIEW OF SYSTEMS:  Constitutional: No fevers, chills, weight loss, weight gain  HEENT: No vision problems, eye pain, nasal congestion, rhinorrhea, sore throat, dysphagia  CV: No chest pain, orthopnea, palpitations  Resp: No cough, dyspnea, wheezing, hemoptysis  GI: +nausea, abd pain, gerd, constipation. no vomiting, diarrhea  :  No dysuria, nocturia, hematuria, urinary frequency   Musculoskeletal: +lower back pain, no arthalgias, no fractures   Skin: no rashes, itchiness, cyanosis   Neurological: no headache, dizziness, syncope,  Psychiatric: + anxiety, no depression, no mood swings   Endocrine: + thyroid issues, no cold or cold intolerance,   Hematologic/Lymphatic: no history of bleeding problems, no easy bruising, no history of prolonged bleeding with surgeries or trauma.   Allergic/Immunologic: No itchy eyes, congestion, hives, rhinorrhea,     PAST MEDICAL & SURGICAL HISTORY:  COPD exacerbation      FAMILY HISTORY:  Daughter - SLE  Daughter - Hyperthyroidism    Social History:  Smokes 1 Pack Per Day; Negative for EtOH and IVDA (22 Sep 2022 12:17)    HOME MEDICATIONS:  Home Medications:  amLODIPine 5 mg oral tablet: 1 tab(s) orally once a day (26 Sep 2022 16:00)  aspirin 81 mg oral tablet, chewable: 1 tab(s) orally once a day (22 Sep 2022 09:36)  captopril 12.5 mg oral tablet: 1 tab(s) orally once a day (26 Sep 2022 16:00)  carvedilol 12.5 mg oral tablet: 1 tab(s) orally every 12 hours (26 Sep 2022 16:00)  Crestor 10 mg oral tablet: 1 tab(s) orally once a day (22 Sep 2022 09:36)  furosemide 40 mg oral tablet: 1 tab(s) orally once a day (26 Sep 2022 16:00)  levothyroxine 75 mcg (0.075 mg) oral tablet: 1 tab(s) orally once a day (26 Sep 2022 16:00)  predniSONE 10 mg oral tablet: 1 tab(s) orally once a day (26 Sep 2022 16:00)  Prevacid 30 mg oral delayed release capsule: 1 cap(s) orally once a day (22 Sep 2022 09:36)      Allergies    hydrALAZINE (Angioedema; Rash)    Intolerances    OBJECTIVE:  ICU Vital Signs Last 24 Hrs  T(C): 36.7 (29 Sep 2022 16:49), Max: 36.9 (29 Sep 2022 12:30)  T(F): 98.1 (29 Sep 2022 16:49), Max: 98.4 (29 Sep 2022 12:30)  HR: 70 (29 Sep 2022 16:49) (58 - 78)  BP: 145/79 (29 Sep 2022 16:49) (99/52 - 145/79)  BP(mean): 91 (29 Sep 2022 12:00) (67 - 93)  ABP: --  ABP(mean): --  RR: 18 (29 Sep 2022 16:49) (15 - 25)  SpO2: 97% (29 Sep 2022 16:49) (97% - 100%)    O2 Parameters below as of 29 Sep 2022 16:49  Patient On (Oxygen Delivery Method): room air              09-28 @ 07:01  -  09-29 @ 07:00  --------------------------------------------------------  IN: 0 mL / OUT: 1350 mL / NET: -1350 mL      CAPILLARY BLOOD GLUCOSE    PHYSICAL EXAM:  GENERAL: NAD, well-developed  HEAD:  Atraumatic, Normocephalic  EYES: EOMI, PERRLA, conjunctiva and sclera clear  NECK: Supple  CHEST/LUNG: Clear to auscultation bilaterally; No wheezes, rhonchi   HEART: Regular rate and rhythm; Normal S1, S2,   ABDOMEN: Soft, Nontender, Nondistended; Bowel sounds present, Lower back is tender to palpation on the Left side.   EXTREMITIES:  2+ Peripheral Pulses, No clubbing, cyanosis, or edema  PSYCH: AAOx3, appropriate affect  NEUROLOGY: non-focal, smith  SKIN: No rashes or lesions    LINES:     HOSPITAL MEDICATIONS:  MEDICATIONS  (STANDING):  carvedilol 12.5 milliGRAM(s) Oral every 12 hours  levothyroxine 75 MICROGram(s) Oral daily  nicotine -  14 mG/24Hr(s) Patch 1 Patch Transdermal daily  polyethylene glycol 3350 17 Gram(s) Oral daily  senna 2 Tablet(s) Oral at bedtime    MEDICATIONS  (PRN):  acetaminophen     Tablet .. 650 milliGRAM(s) Oral every 6 hours PRN Temp greater or equal to 38C (100.4F), Mild Pain (1 - 3)  labetalol Injectable 10 milliGRAM(s) IV Push every 6 hours PRN Systolic blood pressure > 180  melatonin 3 milliGRAM(s) Oral at bedtime PRN Insomnia  nitroglycerin     SubLingual 0.4 milliGRAM(s) SubLingual every 5 minutes PRN Chest Pain      LABS:                        11.0   7.37  )-----------( 226      ( 29 Sep 2022 06:45 )             33.0     09-29    130<L>  |  94<L>  |  91<H>  ----------------------------<  93  4.0   |  23  |  2.90<H>    Ca    8.8      29 Sep 2022 06:45    TPro  8.0  /  Alb  3.6  /  TBili  0.4  /  DBili  x   /  AST  15  /  ALT  24  /  AlkPhos  48  09-29        CARDIAC MARKERS ( 28 Sep 2022 05:50 )  x     / x     / 35 U/L / x     / x          CAPILLARY BLOOD GLUCOSE          RADIOLOGY & ADDITIONAL TESTS:    ASSESSMENT/PLAN:  72F CAD, Lupus, Hypothyroidism, h/o MCTD with Raynaud's and digital cold sensitivity, HTN, CKD3, valvular heart disease, and recent COVID in June 2022 admitted to OSH for AHRF, now with c/f scleroderma renal crisis transferred to [facility] for rheum eval, renal bx.    # Acute kidney injury on CKD  Ddx bilateral renal artery stenosis vs scleroderma renal crisis   - hold Captopril for now iso relative HoTN  - Nephrology c/s in AM - ?renal bx  - rheum c/s in AM  - f/u US duplex kidneys    #Acute Hypoxic Respiratory Failure  - Resolved, now on RA  - CT Chest reviewed with Pulmonary Edema   - Echo with diastolic Dysfunction and EF 45-50%  - Given history of MCTD, c/f ILD and PAH  - Was followed by Pulmonary and Cardiology at OSH    #MCTD   - Confirmed with her Rheumatologist that she has features of Scleroderma and SLE   - Labs and exam concerning for Scleroderma Renal Crisis   - c/w Prednisone 10 mg daily  - rheum c/s as above    #CAD  - History of PCI   - holding Captopril iso relative HoTN  - c/w Coreg     #HTN   - hold Captopril iso relative hypotension    #Hypothyroidism  - c/w Synthroid     PPx measure  - Diet: DASH  - DVT Prophylaxis: Heparin SC   - Disposition: pending hospital course      Attending addendum: agree with plan above. Discussed with HS5. Briefly 72F with hx of SLE, MCTD with AHRF with acute on chronic renal disease here for further evaluation of acute injury.

## 2022-09-29 NOTE — PROGRESS NOTE ADULT - ASSESSMENT
72F CAD, Lupus, Hypothyroidism, HTN, CKD3, and recent COVID in June 2022 admitted for Acute Hypoxic Respiratory Failure.     Scleroderma Renal Crisis / Acute Renal Failure on CKD 3   Captopril; Have consulted with Nephrology  May need sooner than later renal biopsy  Transfer to [facility] is underway due to requiring Rheumatology evaluation for diagnosis     Acute Hypoxic Respiratory Failure  Resolved; CT Chest reviewed. Has Pulmonary Edema and history of CAD; Echo with diastolic Dysfunction and EF 45-50%  Given history of MCTD will need to be concerned for ILD and PAH  Pulmonary and Cardiology Consultation noted     MCTD   Confirmed with her Rheumatologist that she has features of Scleroderma and SLE   Labs and exam concerning for Scleroderma Renal Crisis   Prednisone 10 mg daily    CAD / HTN   BP has improved; Will continue Captopril and Coreg; History of PCI  Cardiology and Nephrology Consult noted     Hypothyroidism  Synthroid increased    Diet  Regular    DVT Prophylaxis  Heparin SC     Disposition   Discharge planning pending hospital course

## 2022-09-30 DIAGNOSIS — I25.10 ATHEROSCLEROTIC HEART DISEASE OF NATIVE CORONARY ARTERY WITHOUT ANGINA PECTORIS: ICD-10-CM

## 2022-09-30 LAB
ANION GAP SERPL CALC-SCNC: 16 MMOL/L — SIGNIFICANT CHANGE UP (ref 5–17)
APPEARANCE UR: CLEAR — SIGNIFICANT CHANGE UP
BACTERIA # UR AUTO: NEGATIVE — SIGNIFICANT CHANGE UP
BILIRUB UR-MCNC: NEGATIVE — SIGNIFICANT CHANGE UP
BUN SERPL-MCNC: 90 MG/DL — HIGH (ref 7–23)
C3 SERPL-MCNC: 111 MG/DL — SIGNIFICANT CHANGE UP (ref 81–157)
C4 SERPL-MCNC: 27 MG/DL — SIGNIFICANT CHANGE UP (ref 13–39)
CALCIUM SERPL-MCNC: 9.2 MG/DL — SIGNIFICANT CHANGE UP (ref 8.4–10.5)
CHLORIDE SERPL-SCNC: 95 MMOL/L — LOW (ref 96–108)
CO2 SERPL-SCNC: 18 MMOL/L — LOW (ref 22–31)
COLOR SPEC: SIGNIFICANT CHANGE UP
CREAT ?TM UR-MCNC: 78 MG/DL — SIGNIFICANT CHANGE UP
CREAT SERPL-MCNC: 2.67 MG/DL — HIGH (ref 0.5–1.3)
DIFF PNL FLD: NEGATIVE — SIGNIFICANT CHANGE UP
DSDNA AB FLD-ACNC: <0.2 AI — SIGNIFICANT CHANGE UP
EGFR: 18 ML/MIN/1.73M2 — LOW
ENA SS-A AB FLD IA-ACNC: 6 AI — HIGH
EPI CELLS # UR: 1 /HPF — SIGNIFICANT CHANGE UP
GLUCOSE SERPL-MCNC: 100 MG/DL — HIGH (ref 70–99)
GLUCOSE UR QL: NEGATIVE — SIGNIFICANT CHANGE UP
HCT VFR BLD CALC: 33.5 % — LOW (ref 34.5–45)
HGB BLD-MCNC: 11.1 G/DL — LOW (ref 11.5–15.5)
HYALINE CASTS # UR AUTO: 1 /LPF — SIGNIFICANT CHANGE UP (ref 0–2)
KETONES UR-MCNC: NEGATIVE — SIGNIFICANT CHANGE UP
LEUKOCYTE ESTERASE UR-ACNC: NEGATIVE — SIGNIFICANT CHANGE UP
MAGNESIUM SERPL-MCNC: 2.3 MG/DL — SIGNIFICANT CHANGE UP (ref 1.6–2.6)
MCHC RBC-ENTMCNC: 31.3 PG — SIGNIFICANT CHANGE UP (ref 27–34)
MCHC RBC-ENTMCNC: 33.1 GM/DL — SIGNIFICANT CHANGE UP (ref 32–36)
MCV RBC AUTO: 94.4 FL — SIGNIFICANT CHANGE UP (ref 80–100)
NITRITE UR-MCNC: NEGATIVE — SIGNIFICANT CHANGE UP
NRBC # BLD: 0 /100 WBCS — SIGNIFICANT CHANGE UP (ref 0–0)
PH UR: 5 — SIGNIFICANT CHANGE UP (ref 5–8)
PHOSPHATE SERPL-MCNC: 5.4 MG/DL — HIGH (ref 2.5–4.5)
PLATELET # BLD AUTO: 273 K/UL — SIGNIFICANT CHANGE UP (ref 150–400)
POTASSIUM SERPL-MCNC: 3.8 MMOL/L — SIGNIFICANT CHANGE UP (ref 3.5–5.3)
POTASSIUM SERPL-SCNC: 3.8 MMOL/L — SIGNIFICANT CHANGE UP (ref 3.5–5.3)
PROT ?TM UR-MCNC: 20 MG/DL — HIGH (ref 0–12)
PROT UR-MCNC: ABNORMAL
PROT/CREAT UR-RTO: 0.3 RATIO — HIGH (ref 0–0.2)
RBC # BLD: 3.55 M/UL — LOW (ref 3.8–5.2)
RBC # FLD: 14.6 % — HIGH (ref 10.3–14.5)
RBC CASTS # UR COMP ASSIST: 5 /HPF — HIGH (ref 0–4)
SODIUM SERPL-SCNC: 129 MMOL/L — LOW (ref 135–145)
SP GR SPEC: 1.01 — SIGNIFICANT CHANGE UP (ref 1.01–1.02)
UROBILINOGEN FLD QL: NEGATIVE — SIGNIFICANT CHANGE UP
WBC # BLD: 7.69 K/UL — SIGNIFICANT CHANGE UP (ref 3.8–10.5)
WBC # FLD AUTO: 7.69 K/UL — SIGNIFICANT CHANGE UP (ref 3.8–10.5)
WBC UR QL: 1 /HPF — SIGNIFICANT CHANGE UP (ref 0–5)

## 2022-09-30 PROCEDURE — 93975 VASCULAR STUDY: CPT | Mod: 26

## 2022-09-30 PROCEDURE — 99222 1ST HOSP IP/OBS MODERATE 55: CPT | Mod: GC

## 2022-09-30 RX ORDER — CAPTOPRIL 12.5 MG/1
6.25 TABLET ORAL DAILY
Refills: 0 | Status: DISCONTINUED | OUTPATIENT
Start: 2022-09-30 | End: 2022-10-02

## 2022-09-30 RX ADMIN — Medication 10 MILLIGRAM(S): at 05:59

## 2022-09-30 RX ADMIN — HEPARIN SODIUM 5000 UNIT(S): 5000 INJECTION INTRAVENOUS; SUBCUTANEOUS at 13:09

## 2022-09-30 RX ADMIN — Medication 75 MICROGRAM(S): at 06:00

## 2022-09-30 RX ADMIN — Medication 1 PATCH: at 13:09

## 2022-09-30 RX ADMIN — CARVEDILOL PHOSPHATE 12.5 MILLIGRAM(S): 80 CAPSULE, EXTENDED RELEASE ORAL at 05:59

## 2022-09-30 RX ADMIN — Medication 1 PATCH: at 17:42

## 2022-09-30 RX ADMIN — HEPARIN SODIUM 5000 UNIT(S): 5000 INJECTION INTRAVENOUS; SUBCUTANEOUS at 06:01

## 2022-09-30 RX ADMIN — HEPARIN SODIUM 5000 UNIT(S): 5000 INJECTION INTRAVENOUS; SUBCUTANEOUS at 22:33

## 2022-09-30 RX ADMIN — Medication 1 PATCH: at 01:12

## 2022-09-30 RX ADMIN — CAPTOPRIL 6.25 MILLIGRAM(S): 12.5 TABLET ORAL at 22:29

## 2022-09-30 NOTE — PROGRESS NOTE ADULT - PROBLEM SELECTOR PLAN 3
- Confirmed with her Rheumatologist that she has features of Scleroderma and SLE   - Labs and exam concerning for Scleroderma Renal Crisis   - c/w Prednisone 10 mg daily  - rheum c/s as above

## 2022-09-30 NOTE — CONSULT NOTE ADULT - NS_IRCONSULTTYPE_GEN_ALL_CORE
ED Provider Note                                                          ED Provider Note    Scribed for Jomar Smith M.D. by Jomar Smith. 12/28/2019  9:42 PM    CHIEF COMPLAINT  Chief Complaint   Patient presents with   • Testicle Pain     started thurday after noon  unknown injury   mother states theres a red sherron on right side      HPI  José Becerra is a 10 y.o. male who presents for evaluation of new onset right testicular pain.  The patient is otherwise healthy, is fully vaccinated, and has no prior history of urinary tract infections or other testicular pains.  He denies any recent traumatic injuries or playful injuries.  He noticed some isolated tenderness in the right testicle approximately 2 days ago.  This was initially a burning sensation and uncomfortable and has since been more frequent in its duration.  It has been intermittent in nature though without any crippling discomfort.  His mother noticed that he was still somewhat uncomfortable today, they have not taking any over-the-counter medications for this, and googled testicular pain he came in concerned about possible torsion.  They are fully established with an outside provider and the office will not be open until Monday.    History was obtained from patient and mother    BirthHx: FT, prolonged hospital stay  PMHx: Pars defect, currently being worked up  PSHx: none    Immunizations: UTD  Allergies: NKDA   SocialHx: lives with parents    REVIEW OF SYSTEMS  Constitutional: No recent illness. No fevers. No recent travel or exposures.  Skin: No rashes.  Eyes: No drainage.  ENT: No ear tugging or drainage. No thrush. No nasal congestion or rhinorrhea. No sore throat.  Resp: No increased work of breathing. No cough.  Cardiac: No known cardiac murmur.  GI: No vomiting or diarrhea. No abdominal pain. Tolerating po.  : No painful urination, no testicular swelling or masses  Musc: No swollen joints or extremity pain.  Neuro: No seizure activity.  "Acting appropriate. No HA.  Endocrine: No history of diabetes.  Heme: No known bleeding disorder.  Allergy: No known food or drug allergies.    PAST MEDICAL HISTORY   has a past medical history of Hypertrophy of tonsil with adenoids and Wheezing.    SOCIAL HISTORY  Patient does not qualify to have social determinant information on file (likely too young).     SURGICAL HISTORY   has a past surgical history that includes myringotomy (2010) and tonsillectomy and adenoidectomy (7/11/2014).    CURRENT MEDICATIONS  Home Medications     Reviewed by Kimber Desai R.N. (Registered Nurse) on 12/28/19 at 2104  Med List Status: Partial   Medication Last Dose Status   albuterol (PROVENTIL) 2.5mg/0.5ml NEBU prn Active   Cetirizine HCl (ZYRTEC CHILDRENS ALLERGY) 5 MG/5ML SYRP 12/27/2019 Active   Pediatric Multivitamins-Fl (MULTIVITAMIN/FLUORIDE) 1 MG CHEW 12/28/2019 Active              ALLERGIES  Allergies   Allergen Reactions   • Cranberry [Prenat W-O [id number]]      PHYSICAL EXAM  VITAL SIGNS: BP 98/59   Pulse (!) 64   Temp (!) 35.8 °C (96.4 °F)   Resp 20   Ht 1.422 m (4' 8\")   Wt 34.8 kg (76 lb 11.5 oz)   SpO2 97%   BMI 17.20 kg/m²    Pulse ox interpretation: I interpret this pulse ox as normal.  General/Constitutional:  Well-nourished, well-developed 10-year-old boy in no apparent distress.   HEENT:  NC/AT.  Sclera anicteric.  EOMI. PERRLA.  Oropharynx clear without erythema or exudates. No neck or cheek swelling  CV:  RRR.  Normal S1/S2.  No murmurs, rubs or gallops appreciated.  Resp:  CTAB in all lung fields.  No wheezes, crackles or rales.  Abd:  Soft, nontender, nondistended.  BS positive in all quadrants.  No rebound or guarding.  No HSM or palpable masses.  :  No CVA tenderness.  Genital exam:  Normal external male genitalia.    Slight redness and discoloration of the skin of the medial aspect of the right testicle.  There is some tenderness with palpation of the right testicle without swelling " Non Face-to-Face or discoloration of the global tissue.  There is no pain with palpation of the inguinal canals bilaterally, there is intact cremasteric reflexes.  There is a normal lie  MSK:  Good tone, moving all extremities spontaneously, No signs of trauma.  No edema or tenderness.  Neuro:  Alert, age appropriate  Skin:  No rash or petechiae visualized.    DIAGNOSTIC STUDIES / PROCEDURES  LABS  Labs Reviewed   URINALYSIS,CULTURE IF INDICATED     RADIOLOGY  [id number]   Final Result      Mildly increased flow to the right testis and right epididymis, concerning for mild epididymo-orchitis.        COURSE & MEDICAL DECISION MAKING  Pertinent Labs & Imaging studies reviewed. (See chart for details)    Differential diagnoses include but not limited to:   Torsion  Orchitis  Hydrocele  Hernia  Cellulitis    9:42 PM - Patient seen and examined at bedside. Patient will be treated with APAP/ibuprofen. Ordered US and UA to evaluate his symptoms.     Medical Decision Making:   Patient presents emergency room for the symptoms as described above.  The patient is nontoxic, afebrile, has clinical exam findings suggestive of some testicular tenderness without gross swelling or clinical signs of acute torsion.  He has had no penile discharge, and cremasteric reflexes are intact.  Based on the above differential, urinalysis and testicular ultrasound is obtained.  There is no gross infectious etiology identified on urinalysis and ultrasound shows increased flow to the right testes and right epididymis that is likely indicating epididymo orchitis.  Based on the age and overall symptomology of recent URI and sick contacts in the family this may be post viral however with the current findings I believe coverage for E. coli is likely warranted.  He will be scheduled on anti-inflammatory medications for acute pain control, prescribed Keflex for 5 days duration and will need repeat evaluation in 2 days regardless of his condition.  He will be  followed up with his outpatient provider at that time.  Mother and the patient understand that the patient has continued pain or discomfort that should be reevaluated in the pediatric emergency room at [facility].    FINAL IMPRESSION  Visit Diagnoses     ICD-10-CM   1. Testicular pain N50.819   2. Epididymitis N45.1      Jomar HERNÁNDEZ (Scribe), am scribing for, and in the presence of, Jomar Smith M.D..    Electronically signed by: Jomar Smith ([place]), 12/28/2019    IJomar M.D. personally performed the services described in this documentation, as scribed by Jomar Smith in my presence, and it is both accurate and complete.    The note accurately reflects work and decisions made by me.  Jomar Smith  12/28/2019  11:24 PM

## 2022-09-30 NOTE — CONSULT NOTE ADULT - ASSESSMENT
Interventional Radiology    Evaluate for Procedure: random renal biopsy     HPI: 72F CAD, Lupus, Hypothyroidism, h/o MCTD with Raynaud's and digital cold sensitivity, HTN, CKD3, valvular heart disease, and recent COVID in June 2022 admitted to OSH for AHRF, now with c/f scleroderma renal crisis transferred to [facility] for rheum eval and renal bx to lupus nephritis.      Allergies: hydrALAZINE (Angioedema; Rash)    Medications (Abx/Cardiac/Anticoagulation/Blood Products)    carvedilol: 12.5 milliGRAM(s) Oral (09-30 @ 05:59)  carvedilol: 12.5 milliGRAM(s) Oral (09-29 @ 05:34)  heparin   Injectable: 5000 Unit(s) SubCutaneous (09-30 @ 13:09)  heparin   Injectable: 5000 Unit(s) SubCutaneous (09-29 @ 13:39)    Data:  157.5  60.3  T(C): 36.7  HR: 59  BP: 121/64  RR: 18  SpO2: 95%    -WBC 7.69 / HgB 11.1 / Hct 33.5 / Plt 273  -Na 129 / Cl 95 / BUN 90 / Glucose 100  -K 3.8 / CO2 18 / Cr 2.67  -ALT -- / Alk Phos -- / T.Bili --    Radiology:     Assessment/Plan: 72F CAD, Lupus, Hypothyroidism, h/o MCTD with Raynaud's and digital cold sensitivity, HTN, CKD3, valvular heart disease, and recent COVID in June 2022 admitted to OSH for AHRF, now with c/f scleroderma renal crisis transferred to [facility] for rheum eval and renal bx to lupus nephritis.    - will plan for renal bx of on 10/4.   - Maintain SBP <140.  - Place order under Giancarlo.   - NEED COVID TEST WITHIN  5 DAYS OF PLANNED PROCEDURE.  - Pt needs to be NPO at 11:59 10/3.  - Please hold All A/C. SQ heparin on hold 24hrs.   - Needs STAT CBC, BMP, Coags in AM.

## 2022-09-30 NOTE — CONSULT NOTE ADULT - SUBJECTIVE AND OBJECTIVE BOX
incomplete SIMBA CHEATHAM  [id number]    HPI: 71 yo F PMH RA, SLE, MCTD, Raynaud's, CAD, hypothyroidism, HTN, CKD3 (baseline creatinine 1.1 2022), COVID 2022 presented to [facility] w/ acute sob. Pt says she had no problems to her sob that started over the span of 2 days. She otherwise complains of occasional joint pain from her RA that is not new. She is on prednisone 5mg every other day per her Rheumatologist Dr. Boyle. Pt says her SLE/RA symptoms are mild and are only limited to occasional joint pain. Pt says she is up to date on her malignancy screening.     ROS:  -Admits to 30 lb unintentional weight loss over months, raynaud' s, dry mouth  -Denies fever, chills, chest pain, sob, abdominal pain, GERD, alopecia, nasal/oral ulcers, active joint pain, rash, headache    Hospital Course: At [facility], found to be hypoxic requiring bipap, later de-escalated to room air. Also w/ 's and NED w/ creatinine 2.8 (baseline 1.1). TTE showing diastolic dysfunction w/ EF 45-50%. Started on prednisone 60mg by medicine for concern for autoimmune etiology for NED. Rheumatology Dr. Victoria consulted for evaluation. Based on exam findings and hx of Raynaud's, high suspicion possible scleroderma (despite centromere, SCL-70 negative). Prednisone decreased to 10mg from 60mg as concern for scleroderma renal crisis. Received captopril 12.5mg on , however further doses held as pt blood pressure rapidly decreased to hypotension, and all bp meds held. Pt transferred to [facility]  for kidney biopsy.     SLE/RA Hx:  -Follows w/ Rheumatologist Dr. Boyle outpatient  -diagnosed w/ RA long time ago, seems to be on chronic prednisone, unclear if was on DMARD/biologic in past  -recently diagnosed w/ SLE based on serology per pt (+VICENTE, DS-DNA), however denies having symptoms. Tried plaquenil but stopped 2/2 side-effects  -currently taking prednisone 5mg every other day at home    Serology: +VICENTE 1:1280 speckled, DS-DNA 44, RNP>8, SSA>8, P/C=.7--->.3  Negative: SSB, centromere, SCL-70, C3/C4        MEDICATIONS  (STANDING):  captopril 6.25 milliGRAM(s) Oral daily  carvedilol 12.5 milliGRAM(s) Oral every 12 hours  heparin   Injectable 5000 Unit(s) SubCutaneous every 8 hours  levothyroxine 75 MICROGram(s) Oral daily  nicotine -  14 mG/24Hr(s) Patch 1 Patch Transdermal daily  polyethylene glycol 3350 17 Gram(s) Oral daily  predniSONE   Tablet 10 milliGRAM(s) Oral daily  senna 2 Tablet(s) Oral at bedtime    MEDICATIONS  (PRN):  acetaminophen     Tablet .. 650 milliGRAM(s) Oral every 6 hours PRN Temp greater or equal to 38C (100.4F), Mild Pain (1 - 3)  labetalol Injectable 10 milliGRAM(s) IV Push every 6 hours PRN Systolic blood pressure > 180  melatonin 3 milliGRAM(s) Oral at bedtime PRN Insomnia  nitroglycerin     SubLingual 0.4 milliGRAM(s) SubLingual every 5 minutes PRN Chest Pain      Allergies    hydrALAZINE (Angioedema; Rash)    Intolerances        PERTINENT MEDICATION HISTORY:    MEDICATIONS: Amlodipine, aspirin, coreg, crestor, lasix, levothyroxine, prednisone     SOCIAL HISTORY: Former smoker, no alcohol, lives w/ . Has 2 healthy kids born at term. Hx hx of miscarriage or clot    FAMILY HISTORY: Daughter w/ SLE      Vital Signs Last 24 Hrs  T(C): 36.7 (30 Sep 2022 11:52), Max: 37.3 (29 Sep 2022 19:51)  T(F): 98.1 (30 Sep 2022 11:52), Max: 99.2 (29 Sep 2022 19:51)  HR: 59 (30 Sep 2022 11:52) (59 - 67)  BP: 121/64 (30 Sep 2022 11:52) (121/64 - 143/67)  BP(mean): --  RR: 18 (30 Sep 2022 11:52) (18 - 18)  SpO2: 95% (30 Sep 2022 11:52) (94% - 95%)    Parameters below as of 30 Sep 2022 11:52  Patient On (Oxygen Delivery Method): room air        Physical Exam:  General: Breathing comfortably on room air, no distress  HEENT: EOMI, MMM, no oral ulcers  CVS: +S1/S2, RRR  Resp: CTA b/l. No crackles/wheezing  GI: Soft, NT/ND +BS  MSK: 5/5 muscle strength in neck, shoulders, arms, hands, fingers, thighs, legs, feet. No synovitis.  Skin: +thickened skin on hands/forearms, puffy fingers, no digital ulcers, hyperpigmentation anterior chest    LABS:                        11.1   7.69  )-----------( 273      ( 30 Sep 2022 07:17 )             33.5         129<L>  |  95<L>  |  90<H>  ----------------------------<  100<H>  3.8   |  18<L>  |  2.67<H>    Ca    9.2      30 Sep 2022 06:56  Phos  5.4       Mg     2.3         TPro  8.0  /  Alb  3.6  /  TBili  0.4  /  DBili  x   /  AST  15  /  ALT  24  /  AlkPhos  48        Urinalysis Basic - ( 30 Sep 2022 05:31 )    Color: Light Yellow / Appearance: Clear / S.013 / pH: x  Gluc: x / Ketone: Negative  / Bili: Negative / Urobili: Negative   Blood: x / Protein: Trace / Nitrite: Negative   Leuk Esterase: Negative / RBC: 5 /hpf / WBC 1 /HPF   Sq Epi: x / Non Sq Epi: 1 /hpf / Bacteria: Negative        RADIOLOGY & ADDITIONAL STUDIES: Reviewed    < from: CT Chest No Cont (22 @ 08:42) >  ACC: [id number] EXAM:  CT ABDOMEN AND PELVIS                        ACC: [id number] EXAM:  CT CHEST                          PROCEDURE DATE:  2022          INTERPRETATION:  CLINICAL INFORMATION: Shortness of breath, acute kidney   injury    COMPARISON: Chest CT 10/16/2019, CT abdomen and pelvis 2018    CONTRAST/COMPLICATIONS:  IV Contrast: NONE  Oral Contrast: NONE  Complications: None reported at time of study completion    PROCEDURE:  CT of the Chest, Abdomen and Pelvis was performed.  Sagittal and coronal reformats were performed.    FINDINGS:  CHEST:  LUNGS AND LARGE AIRWAYS: The central airways are patent. There is   interstitial and mild alveolar pulmonary edema. Previously seen left   upper lobe nodular opacity is not seen andmay be obscured by pulmonary   edema.  PLEURA: Trace bilateral pleural effusions.  VESSELS: Aortic atherosclerosis without aneurysm.  HEART: No cardiomegaly. No pericardial effusion. Coronary, mitral   annular, and aortic valve calcification.  MEDIASTINUM AND JAMIE: No adenopathy.  CHEST WALL AND LOWER NECK: No masses.    ABDOMEN AND PELVIS:  LIVER: Normal.  BILE DUCTS: Nondilated.  GALLBLADDER: Gallstones.  SPLEEN: Normal.  PANCREAS: Normal.  ADRENALS: Normal.  KIDNEYS/URETERS: No hydronephrosis or urinary tract calculi. Bilateral   extrarenal pelvis is again noted.    BLADDER: Cystocele at rest.  REPRODUCTIVE ORGANS: Stable 3.4 cm left adnexal mass since 2018. There is   evidence of pelvic organ descent.    BOWEL: No bowel-related abnormality. Specifically, no evidence of acute   diverticulitis. Normal appendix and ileocecal region. No bowel   obstruction or bowel inflammation. Mildly dilated fluid-filled esophagus.  PERITONEUM: No free air or ascites.  VESSELS: Aortoiliac atherosclerosis without aneurysm.  RETROPERITONEUM/LYMPH NODES: No adenopathy.  ABDOMINAL WALL: Normal.  BONES: No aggressive lesion.    IMPRESSION:  *  Pulmonary edema and trace bilateral pleural effusions.  *  No hydronephrosis or urinary tract calculi. Bilateralextrarenal   pelvis again noted.  *  Mildly dilated fluid-filled esophagus. Aspiration precautions are   advised.  *  Stable 3.4 cm left adnexal mass since 2018 remains indeterminate for   neoplasm.  *  Mild cystocele at rest. Further evaluation for pelvic floor   insufficiency is recommended.      --- End of Report --     SIMBA CHEATHAM  [id number]    HPI: 73 yo F PMH RA, SLE, MCTD, Raynaud's, CAD, hypothyroidism, HTN, CKD3 (baseline creatinine 1.1 2022), COVID 2022 presented to [facility] w/ acute sob. Pt says she had no problems to her sob that started over the span of 2 days. She otherwise complains of occasional joint pain from her RA that is not new. She is on prednisone 5mg every other day per her Rheumatologist Dr. Boyle. Pt says her SLE/RA symptoms are mild and are only limited to occasional joint pain. Pt says she is up to date on her malignancy screening.     ROS:  -Admits to 30 lb unintentional weight loss over months, raynaud' s, dry mouth  -Denies fever, chills, chest pain, sob, abdominal pain, GERD, alopecia, nasal/oral ulcers, active joint pain, rash, headache    Hospital Course: At [facility], found to be hypoxic requiring bipap, later de-escalated to room air. Also w/ 's and NED w/ creatinine 2.8 (baseline 1.1). TTE showing diastolic dysfunction w/ EF 45-50%. Started on prednisone 60mg by medicine for concern for autoimmune etiology for NED. Rheumatology Dr. Hernández consulted for evaluation. Based on exam findings and hx of Raynaud's, high suspicion possible scleroderma (despite centromere, SCL-70 negative). Prednisone decreased to 10mg from 60mg as concern for scleroderma renal crisis. Received captopril 12.5mg on , however further doses held as pt blood pressure rapidly decreased to hypotension, and all bp meds held. Pt transferred to [facility]  for kidney biopsy.     SLE/RA Hx:  -Follows w/ Rheumatologist Dr. Boyle outpatient  -diagnosed w/ RA long time ago, seems to be on chronic prednisone, unclear if was on DMARD/biologic in past  -recently diagnosed w/ SLE based on serology per pt (+VICENTE, DS-DNA), however denies having symptoms. Tried plaquenil but stopped 2/2 side-effects  -currently taking prednisone 5mg every other day at home    Serology: +VICENTE 1:1280 speckled, DS-DNA 44, RNP>8, SSA>8, P/C=.7--->.3  Negative: SSB, centromere, SCL-70, C3/C4        MEDICATIONS  (STANDING):  captopril 6.25 milliGRAM(s) Oral daily  carvedilol 12.5 milliGRAM(s) Oral every 12 hours  heparin   Injectable 5000 Unit(s) SubCutaneous every 8 hours  levothyroxine 75 MICROGram(s) Oral daily  nicotine -  14 mG/24Hr(s) Patch 1 Patch Transdermal daily  polyethylene glycol 3350 17 Gram(s) Oral daily  predniSONE   Tablet 10 milliGRAM(s) Oral daily  senna 2 Tablet(s) Oral at bedtime    MEDICATIONS  (PRN):  acetaminophen     Tablet .. 650 milliGRAM(s) Oral every 6 hours PRN Temp greater or equal to 38C (100.4F), Mild Pain (1 - 3)  labetalol Injectable 10 milliGRAM(s) IV Push every 6 hours PRN Systolic blood pressure > 180  melatonin 3 milliGRAM(s) Oral at bedtime PRN Insomnia  nitroglycerin     SubLingual 0.4 milliGRAM(s) SubLingual every 5 minutes PRN Chest Pain      Allergies    hydrALAZINE (Angioedema; Rash)    Intolerances        PERTINENT MEDICATION HISTORY:    MEDICATIONS: Amlodipine, aspirin, coreg, crestor, lasix, levothyroxine, prednisone     SOCIAL HISTORY: Former smoker, no alcohol, lives w/ . Has 2 healthy kids born at term. Hx hx of miscarriage or clot    FAMILY HISTORY: Daughter w/ SLE      Vital Signs Last 24 Hrs  T(C): 36.7 (30 Sep 2022 11:52), Max: 37.3 (29 Sep 2022 19:51)  T(F): 98.1 (30 Sep 2022 11:52), Max: 99.2 (29 Sep 2022 19:51)  HR: 59 (30 Sep 2022 11:52) (59 - 67)  BP: 121/64 (30 Sep 2022 11:52) (121/64 - 143/67)  BP(mean): --  RR: 18 (30 Sep 2022 11:52) (18 - 18)  SpO2: 95% (30 Sep 2022 11:52) (94% - 95%)    Parameters below as of 30 Sep 2022 11:52  Patient On (Oxygen Delivery Method): room air        Physical Exam:  General: Breathing comfortably on room air, no distress  HEENT: EOMI, MMM, no oral ulcers  CVS: +S1/S2, RRR  Resp: CTA b/l. No crackles/wheezing  GI: Soft, NT/ND +BS  MSK: 5/5 muscle strength in neck, shoulders, arms, hands, fingers, thighs, legs, feet. No synovitis.  Skin: +thickened skin on hands/forearms, puffy fingers, no digital ulcers, hyperpigmentation anterior chest    LABS:                        11.1   7.69  )-----------( 273      ( 30 Sep 2022 07:17 )             33.5         129<L>  |  95<L>  |  90<H>  ----------------------------<  100<H>  3.8   |  18<L>  |  2.67<H>    Ca    9.2      30 Sep 2022 06:56  Phos  5.4       Mg     2.3         TPro  8.0  /  Alb  3.6  /  TBili  0.4  /  DBili  x   /  AST  15  /  ALT  24  /  AlkPhos  48        Urinalysis Basic - ( 30 Sep 2022 05:31 )    Color: Light Yellow / Appearance: Clear / S.013 / pH: x  Gluc: x / Ketone: Negative  / Bili: Negative / Urobili: Negative   Blood: x / Protein: Trace / Nitrite: Negative   Leuk Esterase: Negative / RBC: 5 /hpf / WBC 1 /HPF   Sq Epi: x / Non Sq Epi: 1 /hpf / Bacteria: Negative        RADIOLOGY & ADDITIONAL STUDIES: Reviewed    < from: CT Chest No Cont (22 @ 08:42) >  ACC: [id number] EXAM:  CT ABDOMEN AND PELVIS                        ACC: [id number] EXAM:  CT CHEST                          PROCEDURE DATE:  2022          INTERPRETATION:  CLINICAL INFORMATION: Shortness of breath, acute kidney   injury    COMPARISON: Chest CT 10/16/2019, CT abdomen and pelvis 2018    CONTRAST/COMPLICATIONS:  IV Contrast: NONE  Oral Contrast: NONE  Complications: None reported at time of study completion    PROCEDURE:  CT of the Chest, Abdomen and Pelvis was performed.  Sagittal and coronal reformats were performed.    FINDINGS:  CHEST:  LUNGS AND LARGE AIRWAYS: The central airways are patent. There is   interstitial and mild alveolar pulmonary edema. Previously seen left   upper lobe nodular opacity is not seen andmay be obscured by pulmonary   edema.  PLEURA: Trace bilateral pleural effusions.  VESSELS: Aortic atherosclerosis without aneurysm.  HEART: No cardiomegaly. No pericardial effusion. Coronary, mitral   annular, and aortic valve calcification.  MEDIASTINUM AND JAMIE: No adenopathy.  CHEST WALL AND LOWER NECK: No masses.    ABDOMEN AND PELVIS:  LIVER: Normal.  BILE DUCTS: Nondilated.  GALLBLADDER: Gallstones.  SPLEEN: Normal.  PANCREAS: Normal.  ADRENALS: Normal.  KIDNEYS/URETERS: No hydronephrosis or urinary tract calculi. Bilateral   extrarenal pelvis is again noted.    BLADDER: Cystocele at rest.  REPRODUCTIVE ORGANS: Stable 3.4 cm left adnexal mass since 2018. There is   evidence of pelvic organ descent.    BOWEL: No bowel-related abnormality. Specifically, no evidence of acute   diverticulitis. Normal appendix and ileocecal region. No bowel   obstruction or bowel inflammation. Mildly dilated fluid-filled esophagus.  PERITONEUM: No free air or ascites.  VESSELS: Aortoiliac atherosclerosis without aneurysm.  RETROPERITONEUM/LYMPH NODES: No adenopathy.  ABDOMINAL WALL: Normal.  BONES: No aggressive lesion.    IMPRESSION:  *  Pulmonary edema and trace bilateral pleural effusions.  *  No hydronephrosis or urinary tract calculi. Bilateralextrarenal   pelvis again noted.  *  Mildly dilated fluid-filled esophagus. Aspiration precautions are   advised.  *  Stable 3.4 cm left adnexal mass since 2018 remains indeterminate for   neoplasm.  *  Mild cystocele at rest. Further evaluation for pelvic floor   insufficiency is recommended.      --- End of Report --

## 2022-09-30 NOTE — PROGRESS NOTE ADULT - PROBLEM SELECTOR PLAN 1
Ddx bilateral renal artery stenosis vs scleroderma renal crisis   - hold Captopril for now iso relative HoTN  - Nephrology c/s in AM - ?renal bx  - rheum c/s in AM  - f/u US duplex kidneys

## 2022-09-30 NOTE — PROGRESS NOTE ADULT - ASSESSMENT
72F CAD, Lupus, Hypothyroidism, h/o MCTD with Raynaud's and digital cold sensitivity, HTN, CKD3, valvular heart disease, and recent COVID in June 2022 admitted to OSH for AHRF, now with c/f scleroderma renal crisis transferred to [facility] for rheum eval, renal bx.

## 2022-09-30 NOTE — PROGRESS NOTE ADULT - ATTENDING COMMENTS
Agree with plan above. Discussed with HS5. Briefly 72F with hx of SLE, MCTD with AHRF with acute on chronic renal disease here for further evaluation of acute kidney injury. Ddx includes scleroderma renal crisis vs. lupus nephritis. Will require kidney biopsy for further determination. IR consulted. Plan for kidney biopsy 10/4. Restarted captopril at low dose, monitor BP. Discussed with nephrology attending Dr. Vallejo.

## 2022-09-30 NOTE — PROGRESS NOTE ADULT - PROBLEM SELECTOR PLAN 2
- Resolved, now on RA  - CT Chest reviewed with Pulmonary Edema   - Echo with diastolic Dysfunction and EF 45-50%  - Given history of MCTD, c/f ILD and PAH  - Was followed by Pulmonary and Cardiology at [facility]

## 2022-09-30 NOTE — CONSULT NOTE ADULT - ASSESSMENT
71 yo F PMH RA, SLE, MCTD, Raynaud's, CAD, hypothyroidism, HTN, CKD3 (baseline creatinine 1.1 6/2022), COVID 6/2022 presented to [facility] w/ acute sob found to have pulmonary edema 2/2 hypertensive emergency w/ NED (creatinine 2.8) on CKD w/ concern for scleroderma renal crisis, s/p captopril initiation (9/26/22), transferred to [facility] for kidney biopsy. Rheumatology consulted for evaluation    #NED on CKD:   =Pt w/ hx of RA, SLE, MCTD (+RNP) w/ features of Raynaud's and physical exam findings consistent w/ scleroderma  =centromere and SCL-70 negative, however awaiting RNA-Polymerase 3  =If RNA polymerase 3 negative, would be highly suspicious for paraneoplastic process presenting as scleroderma mimicker  =lower suspicion for lupus nephritis as complements wnl and improvement in P/C ratio and creatinine stability since captopril initiation 9/26  =Sjogren's, APS, myositis also in differential    Serology: +VICENTE 1:1280 speckled, DS-DNA 44, RNP>8, SSA>8, P/C=.7--->.3  Negative: SSB, centromere, SCL-70, C3/C4    Plan:  -obtain DS-DNA, Jensen, APS serology, SPEP, UPEP, myomarker panel, RNA polymerase 3 (ordered)   -c/w prednisone 10mg qd  -plan for renal biopsy 10/4/22 w/ IR  -ensure patient has been up to date on her malignancy screening   -obtain further collateral w/ her Rheumatologist Dr. Boyle    Discussed with Attending Dr. Nic Kohler DO  Rheumatology Fellow  Pager: [phone]  Available on TEAMS 71 yo F PMH RA, SLE, MCTD, Raynaud's, CAD, hypothyroidism, HTN, CKD3 (baseline creatinine 1.1 6/2022), COVID 6/2022 presented to [facility] w/ acute sob found to have pulmonary edema 2/2 hypertensive emergency w/ NED (creatinine 2.8) on CKD w/ concern for scleroderma renal crisis, s/p captopril initiation (9/26/22), transferred to [facility] for kidney biopsy. Rheumatology consulted for evaluation    #NED on CKD:   =Pt w/ hx of RA, SLE, MCTD (+RNP) w/ features of Raynaud's and physical exam findings consistent w/ scleroderma  =centromere and SCL-70 negative, however awaiting RNA-Polymerase 3  =another consideration is paraneoplastic process presenting as scleroderma mimicker  elevated Kappa/lamda ratio, weight loss...  =lower suspicion for lupus nephritis as complements wnl and improvement in P/C ratio and creatinine stability since captopril initiation 9/26  =Sjogren's, APS, also in differential    Serology: +VICENTE 1:1280 speckled, DS-DNA 44, RNP>8, SSA>8, P/C=.7--->.3  Negative: SSB, centromere, SCL-70, C3/C4    Plan:  -obtain DS-DNA, Jensen, APS serology, SPEP, UPEP, myomarker panel, RNA polymerase 3 (ordered)   -c/w prednisone 10mg qd  -plan for renal biopsy 10/4/22 w/ IR  -ensure patient has been up to date on her malignancy screening   -obtain further collateral w/ her Rheumatologist Dr. Boyle    Discussed with Attending Dr. Nic Kohler DO  Rheumatology Fellow  Pager: [phone]  Available on TEAMS

## 2022-09-30 NOTE — PROGRESS NOTE ADULT - SUBJECTIVE AND OBJECTIVE BOX
NEPHROLOGY PROGRESS NOTE    CHIEF COMPLAINT:  NED    HPI:  Renal function stabilized and BP under control.    ROS:  denies SOB    EXAM:  T(F): 98.1 (22 @ 11:52)  HR: 59 (22 @ 11:52)  BP: 121/64 (22 @ 11:52)  RR: 18 (22 @ 11:52)  SpO2: 95% (22 @ 11:52)    Conversant, in no apparent distress  Normal respiratory effort, lungs clear bilaterally  Heart RRR with no murmur, no peripheral edema         LABS                             11.1   7.69  )-----------( 273      ( 30 Sep 2022 07:17 )             33.5              129<L>  |  95<L>  |  90<H>  ----------------------------<  100<H>  3.8   |  18<L>  |  2.67<H>    Ca    9.2      30 Sep 2022 06:56  Phos  5.4       Mg     2.3         TPro  8.0  /  Alb  3.6  /  TBili  0.4  /  DBili  x   /  AST  15  /  ALT  24  /  AlkPhos  48      anti SSA positive      Urinalysis Basic - ( 30 Sep 2022 05:31 )  Color: Light Yellow / Appearance: Clear / S.013 / pH: x  Gluc: x / Ketone: Negative  / Bili: Negative / Urobili: Negative   Blood: x / Protein: Trace / Nitrite: Negative   Leuk Esterase: Negative / RBC: 5 /hpf / WBC 1 /HPF   Sq Epi: x / Non Sq Epi: 1 /hpf / Bacteria: Negative    Urine P/C 0.3      RA doppler shows no evidence of SHILA      Impression:  NED - cryptic, bland urine, + rheumatologic history so SLE nephritis in differential as well as scleroderma renal crisis  HTN well controlled now on ACE and carvedilol    Recommendations:   Agree with renal biopsy for diagnosis and best guide treatment   Follow plasma renin activity

## 2022-10-01 LAB
ANION GAP SERPL CALC-SCNC: 14 MMOL/L — SIGNIFICANT CHANGE UP (ref 5–17)
ANTI-RIBONUCLEAR PROTEIN: >8 AI — HIGH
BUN SERPL-MCNC: 91 MG/DL — HIGH (ref 7–23)
CALCIUM SERPL-MCNC: 9.4 MG/DL — SIGNIFICANT CHANGE UP (ref 8.4–10.5)
CHLORIDE SERPL-SCNC: 97 MMOL/L — SIGNIFICANT CHANGE UP (ref 96–108)
CHLORIDE UR-SCNC: <20 MMOL/L — SIGNIFICANT CHANGE UP
CO2 SERPL-SCNC: 18 MMOL/L — LOW (ref 22–31)
CREAT SERPL-MCNC: 2.63 MG/DL — HIGH (ref 0.5–1.3)
DSDNA AB SER-ACNC: 33 IU/ML — HIGH
EGFR: 19 ML/MIN/1.73M2 — LOW
ENA SM AB FLD QL: <0.2 AI — SIGNIFICANT CHANGE UP
GLUCOSE SERPL-MCNC: 94 MG/DL — SIGNIFICANT CHANGE UP (ref 70–99)
HBV CORE AB SER-ACNC: SIGNIFICANT CHANGE UP
HBV CORE IGM SER-ACNC: SIGNIFICANT CHANGE UP
HBV SURFACE AB SER-ACNC: SIGNIFICANT CHANGE UP
HBV SURFACE AG SER-ACNC: SIGNIFICANT CHANGE UP
HCT VFR BLD CALC: 31.1 % — LOW (ref 34.5–45)
HGB BLD-MCNC: 10.3 G/DL — LOW (ref 11.5–15.5)
MAGNESIUM SERPL-MCNC: 2.3 MG/DL — SIGNIFICANT CHANGE UP (ref 1.6–2.6)
MCHC RBC-ENTMCNC: 31.5 PG — SIGNIFICANT CHANGE UP (ref 27–34)
MCHC RBC-ENTMCNC: 33.1 GM/DL — SIGNIFICANT CHANGE UP (ref 32–36)
MCV RBC AUTO: 95.1 FL — SIGNIFICANT CHANGE UP (ref 80–100)
MYELOPEROXIDASE AB SER-ACNC: <5 UNITS — SIGNIFICANT CHANGE UP
MYELOPEROXIDASE CELLS FLD QL: NEGATIVE — SIGNIFICANT CHANGE UP
NRBC # BLD: 0 /100 WBCS — SIGNIFICANT CHANGE UP (ref 0–0)
PHOSPHATE SERPL-MCNC: 5.1 MG/DL — HIGH (ref 2.5–4.5)
PLATELET # BLD AUTO: 249 K/UL — SIGNIFICANT CHANGE UP (ref 150–400)
POTASSIUM SERPL-MCNC: 3.8 MMOL/L — SIGNIFICANT CHANGE UP (ref 3.5–5.3)
POTASSIUM SERPL-SCNC: 3.8 MMOL/L — SIGNIFICANT CHANGE UP (ref 3.5–5.3)
PROT SERPL-MCNC: 7.3 G/DL — SIGNIFICANT CHANGE UP (ref 6–8.3)
PROT SERPL-MCNC: 7.3 G/DL — SIGNIFICANT CHANGE UP (ref 6–8.3)
PROTEINASE3 AB FLD-ACNC: <5 UNITS — SIGNIFICANT CHANGE UP
PROTEINASE3 AB SER-ACNC: NEGATIVE — SIGNIFICANT CHANGE UP
RBC # BLD: 3.27 M/UL — LOW (ref 3.8–5.2)
RBC # FLD: 14.3 % — SIGNIFICANT CHANGE UP (ref 10.3–14.5)
SODIUM SERPL-SCNC: 129 MMOL/L — LOW (ref 135–145)
WBC # BLD: 7.93 K/UL — SIGNIFICANT CHANGE UP (ref 3.8–10.5)
WBC # FLD AUTO: 7.93 K/UL — SIGNIFICANT CHANGE UP (ref 3.8–10.5)

## 2022-10-01 PROCEDURE — 99233 SBSQ HOSP IP/OBS HIGH 50: CPT

## 2022-10-01 RX ORDER — CARVEDILOL PHOSPHATE 80 MG/1
3.12 CAPSULE, EXTENDED RELEASE ORAL EVERY 12 HOURS
Refills: 0 | Status: DISCONTINUED | OUTPATIENT
Start: 2022-10-01 | End: 2022-10-05

## 2022-10-01 RX ADMIN — Medication 1 PATCH: at 12:45

## 2022-10-01 RX ADMIN — Medication 10 MILLIGRAM(S): at 06:15

## 2022-10-01 RX ADMIN — HEPARIN SODIUM 5000 UNIT(S): 5000 INJECTION INTRAVENOUS; SUBCUTANEOUS at 21:35

## 2022-10-01 RX ADMIN — HEPARIN SODIUM 5000 UNIT(S): 5000 INJECTION INTRAVENOUS; SUBCUTANEOUS at 06:14

## 2022-10-01 RX ADMIN — CARVEDILOL PHOSPHATE 12.5 MILLIGRAM(S): 80 CAPSULE, EXTENDED RELEASE ORAL at 06:16

## 2022-10-01 RX ADMIN — Medication 75 MICROGRAM(S): at 06:16

## 2022-10-01 RX ADMIN — HEPARIN SODIUM 5000 UNIT(S): 5000 INJECTION INTRAVENOUS; SUBCUTANEOUS at 12:46

## 2022-10-01 RX ADMIN — Medication 1 PATCH: at 17:14

## 2022-10-01 RX ADMIN — Medication 3 MILLIGRAM(S): at 21:35

## 2022-10-01 RX ADMIN — Medication 1 PATCH: at 07:49

## 2022-10-01 RX ADMIN — CAPTOPRIL 6.25 MILLIGRAM(S): 12.5 TABLET ORAL at 06:16

## 2022-10-01 RX ADMIN — CARVEDILOL PHOSPHATE 3.12 MILLIGRAM(S): 80 CAPSULE, EXTENDED RELEASE ORAL at 17:18

## 2022-10-01 RX ADMIN — SENNA PLUS 2 TABLET(S): 8.6 TABLET ORAL at 21:36

## 2022-10-01 NOTE — PROGRESS NOTE ADULT - SUBJECTIVE AND OBJECTIVE BOX
Internal Medicine   Jillian Dasilva | PGY-2    OVERNIGHT EVENTS:      SUBJECTIVE:       MEDICATIONS  (STANDING):  captopril 6.25 milliGRAM(s) Oral daily  carvedilol 12.5 milliGRAM(s) Oral every 12 hours  heparin   Injectable 5000 Unit(s) SubCutaneous every 8 hours  levothyroxine 75 MICROGram(s) Oral daily  nicotine -  14 mG/24Hr(s) Patch 1 Patch Transdermal daily  polyethylene glycol 3350 17 Gram(s) Oral daily  predniSONE   Tablet 10 milliGRAM(s) Oral daily  senna 2 Tablet(s) Oral at bedtime    MEDICATIONS  (PRN):  acetaminophen     Tablet .. 650 milliGRAM(s) Oral every 6 hours PRN Temp greater or equal to 38C (100.4F), Mild Pain (1 - 3)  labetalol Injectable 10 milliGRAM(s) IV Push every 6 hours PRN Systolic blood pressure > 180  melatonin 3 milliGRAM(s) Oral at bedtime PRN Insomnia  nitroglycerin     SubLingual 0.4 milliGRAM(s) SubLingual every 5 minutes PRN Chest Pain        T(F): 97.7 (10-01-22 @ 11:59), Max: 98.5 (09-30-22 @ 20:07)  HR: 60 (10-01-22 @ 11:59) (60 - 71)  BP: 93/60 (10-01-22 @ 11:59) (93/60 - 140/66)  BP(mean): --  RR: 18 (10-01-22 @ 11:59) (18 - 18)  SpO2: 97% (10-01-22 @ 11:59) (97% - 98%)    PHYSICAL EXAM:     GENERAL: NAD, lying in bed comfortably.  HEAD:  Atraumatic, normocephalic.  CHEST/LUNG: CTAB. No rales, rhonchi, wheezing, or rubs. Unlabored respirations.  HEART: RRR, no M/R/G, normal S1/S2.  ABDOMEN: Soft, nontender, nondistended. Normoactive bowel sounds.  EXTREMITIES:  2+ peripheral pulses b/l. No clubbing, cyanosis, or edema.  MSK: No gross deformities noted.   NEURO:  AAOx3, no focal deficits.   SKIN: No rashes or lesions.  PSYCH: Normal mood, affect.    TELEMETRY:    LABS:                        10.3   7.93  )-----------( 249      ( 01 Oct 2022 07:30 )             31.1     10-01    129<L>  |  97  |  91<H>  ----------------------------<  94  3.8   |  18<L>  |  2.63<H>    Ca    9.4      01 Oct 2022 07:28  Phos  5.1     10-01  Mg     2.3     10-01    TPro  7.3  /  Alb  x   /  TBili  x   /  DBili  x   /  AST  x   /  ALT  x   /  AlkPhos  x   10-01            Creatinine Trend: 2.63<--, 2.67<--, 2.90<--, 2.65<--, 2.54<--, 2.79<--  I&O's Summary    30 Sep 2022 07:01  -  01 Oct 2022 07:00  --------------------------------------------------------  IN: 240 mL / OUT: 0 mL / NET: 240 mL      BNP    RADIOLOGY & ADDITIONAL STUDIES:

## 2022-10-01 NOTE — PROGRESS NOTE ADULT - PROBLEM SELECTOR PLAN 4
- hold Captopril iso recent relative hypotension, will re-assess the decision to restart medication - c/w Captopril at 6.25 qD

## 2022-10-01 NOTE — PROGRESS NOTE ADULT - PROBLEM SELECTOR PLAN 3
- Confirmed with her Rheumatologist that she has features of Scleroderma and SLE   - Labs and exam concerning for Scleroderma Renal Crisis   - c/w Prednisone 10 mg daily  - rheum c/s as above - Confirmed with her Rheumatologist that she has features of Scleroderma and SLE   - Labs and exam concerning for Scleroderma Renal Crisis   - c/w Prednisone 10 mg daily  - rheum c/s as above  - obtain collateral from OP rheum

## 2022-10-01 NOTE — PROGRESS NOTE ADULT - SUBJECTIVE AND OBJECTIVE BOX
Denies CP, SOB, no N/V    Vital Signs Last 24 Hrs  T(C): 36.5 (10-01-22 @ 11:59), Max: 36.9 (22 @ 20:07)  T(F): 97.7 (10-01-22 @ 11:59), Max: 98.5 (22 @ 20:07)  HR: 60 (10-01-22 @ 11:59) (60 - 71)  BP: 130/77 (10-01-22 @ 17:15) (93/60 - 140/66)  RR: 18 (10-01-22 @ 11:59) (18 - 18)  SpO2: 97% (10-01-22 @ 11:59) (97% - 98%)    s1s2  b/l air entry  soft, ND  no edema  AO                                10.3   7.93  )-----------( 249      ( 01 Oct 2022 07:30 )             31.1     01 Oct 2022 07:28    129    |  97     |  91     ----------------------------<  94     3.8     |  18     |  2.63     Ca    9.4        01 Oct 2022 07:28  Phos  5.1       01 Oct 2022 07:28  Mg     2.3       01 Oct 2022 07:28    TPro  7.3    /  Alb  x      /  TBili  x      /  DBili  x      /  AST  x      /  ALT  x      /  AlkPhos  x      01 Oct 2022 07:32    LIVER FUNCTIONS - ( 01 Oct 2022 07:32 )  Alb: x     / Pro: 7.3 g/dL / ALK PHOS: x     / ALT: x     / AST: x     / GGT: x           Urinalysis Basic - ( 30 Sep 2022 05:31 )    Color: Light Yellow / Appearance: Clear / S.013 / pH: x  Gluc: x / Ketone: Negative  / Bili: Negative / Urobili: Negative   Blood: x / Protein: Trace / Nitrite: Negative   Leuk Esterase: Negative / RBC: 5 /hpf / WBC 1 /HPF   Sq Epi: x / Non Sq Epi: 1 /hpf / Bacteria: Negative    acetaminophen     Tablet .. 650 milliGRAM(s) Oral every 6 hours PRN  captopril 6.25 milliGRAM(s) Oral daily  carvedilol 3.125 milliGRAM(s) Oral every 12 hours  heparin   Injectable 5000 Unit(s) SubCutaneous every 8 hours  levothyroxine 75 MICROGram(s) Oral daily  melatonin 3 milliGRAM(s) Oral at bedtime PRN  nicotine -  14 mG/24Hr(s) Patch 1 Patch Transdermal daily  nitroglycerin     SubLingual 0.4 milliGRAM(s) SubLingual every 5 minutes PRN  polyethylene glycol 3350 17 Gram(s) Oral daily  predniSONE   Tablet 10 milliGRAM(s) Oral daily  senna 2 Tablet(s) Oral at bedtime    Impression/Plan:    CM, mod AR, MR  Hx SLE, MCTD, suspected Scleroderma w/Raynaud  Rheum input appr  Adm w/SOB, improved  BP presently stable  NED in setting of CM, valvular heart disease, CTD (Cr 1.19 - 22, Cr 0.94 - 10/25/21)  Started on ACE  No overt volume overload  Cr has been fairly stable  BMP daily  Avoid nephrotoxins  Plan for renal biopsy 10/4/22 to help guide therapy   D/w pt and family at bedside  Will follow    [phone]

## 2022-10-01 NOTE — PROGRESS NOTE ADULT - ATTENDING COMMENTS
Briefly 72F with hx of SLE, MCTD with AHRF with acute on chronic renal disease here for further evaluation of acute kidney injury. Ddx includes scleroderma renal crisis, less likely lupus nephritis. Will require kidney biopsy for further determination. IR consulted. Plan for kidney biopsy 10/4. Of note, she became significantly hypotensive after starting captopril 50 mg daily. Discussed with nephrology attending Dr. Vallejo - restarted captopril at low dose, monitor BP closely.     Agree with plan above. Discussed with HS5.

## 2022-10-01 NOTE — PROGRESS NOTE ADULT - ASSESSMENT
72F CAD, Lupus, Hypothyroidism, h/o MCTD with Raynaud's and digital cold sensitivity, HTN, CKD3, valvular heart disease, and recent COVID in June 2022 admitted to OSH for AHRF, now with c/f scleroderma renal crisis transferred to [facility] for rheum eval, renal bx.

## 2022-10-01 NOTE — PROGRESS NOTE ADULT - PROBLEM SELECTOR PLAN 6
- History of PCI   - holding Captopril iso recent relative HoTN  - c/w Coreg - History of PCI   - c/w Captopril   - c/w Coreg

## 2022-10-01 NOTE — PROGRESS NOTE ADULT - PROBLEM SELECTOR PLAN 1
Ddx bilateral renal artery stenosis vs scleroderma renal crisis   - hold Captopril for now iso relative HoTN  - Nephrology c/s in AM - ?renal bx  - rheum c/s in AM  - f/u US duplex kidneys Ddx bilateral renal artery stenosis vs scleroderma renal crisis   - hold Captopril for now iso relative HoTN  - Nephrology following, recs appreciated  - rheum following, recs appreciated  - US duplex kidneys w/o SHILA  - Plan for renal bx with IR 10/4

## 2022-10-02 DIAGNOSIS — N17.9 ACUTE KIDNEY FAILURE, UNSPECIFIED: ICD-10-CM

## 2022-10-02 LAB
ANION GAP SERPL CALC-SCNC: 15 MMOL/L — SIGNIFICANT CHANGE UP (ref 5–17)
BUN SERPL-MCNC: 88 MG/DL — HIGH (ref 7–23)
CALCIUM SERPL-MCNC: 9.4 MG/DL — SIGNIFICANT CHANGE UP (ref 8.4–10.5)
CHLORIDE SERPL-SCNC: 98 MMOL/L — SIGNIFICANT CHANGE UP (ref 96–108)
CO2 SERPL-SCNC: 16 MMOL/L — LOW (ref 22–31)
CREAT SERPL-MCNC: 2.54 MG/DL — HIGH (ref 0.5–1.3)
EGFR: 20 ML/MIN/1.73M2 — LOW
GLUCOSE SERPL-MCNC: 84 MG/DL — SIGNIFICANT CHANGE UP (ref 70–99)
HCT VFR BLD CALC: 31 % — LOW (ref 34.5–45)
HGB BLD-MCNC: 10.1 G/DL — LOW (ref 11.5–15.5)
MAGNESIUM SERPL-MCNC: 2.2 MG/DL — SIGNIFICANT CHANGE UP (ref 1.6–2.6)
MCHC RBC-ENTMCNC: 31.5 PG — SIGNIFICANT CHANGE UP (ref 27–34)
MCHC RBC-ENTMCNC: 32.6 GM/DL — SIGNIFICANT CHANGE UP (ref 32–36)
MCV RBC AUTO: 96.6 FL — SIGNIFICANT CHANGE UP (ref 80–100)
NRBC # BLD: 0 /100 WBCS — SIGNIFICANT CHANGE UP (ref 0–0)
PHOSPHATE SERPL-MCNC: 4.5 MG/DL — SIGNIFICANT CHANGE UP (ref 2.5–4.5)
PLATELET # BLD AUTO: 244 K/UL — SIGNIFICANT CHANGE UP (ref 150–400)
POTASSIUM SERPL-MCNC: 3.9 MMOL/L — SIGNIFICANT CHANGE UP (ref 3.5–5.3)
POTASSIUM SERPL-SCNC: 3.9 MMOL/L — SIGNIFICANT CHANGE UP (ref 3.5–5.3)
RBC # BLD: 3.21 M/UL — LOW (ref 3.8–5.2)
RBC # FLD: 14.4 % — SIGNIFICANT CHANGE UP (ref 10.3–14.5)
SARS-COV-2 RNA SPEC QL NAA+PROBE: SIGNIFICANT CHANGE UP
SODIUM SERPL-SCNC: 129 MMOL/L — LOW (ref 135–145)
T4 FREE SERPL-MCNC: 1.4 NG/DL — SIGNIFICANT CHANGE UP (ref 0.9–1.8)
TSH SERPL-MCNC: 4.25 UIU/ML — HIGH (ref 0.27–4.2)
URATE SERPL-MCNC: 9.2 MG/DL — HIGH (ref 2.5–7)
WBC # BLD: 7.31 K/UL — SIGNIFICANT CHANGE UP (ref 3.8–10.5)
WBC # FLD AUTO: 7.31 K/UL — SIGNIFICANT CHANGE UP (ref 3.8–10.5)

## 2022-10-02 PROCEDURE — 99233 SBSQ HOSP IP/OBS HIGH 50: CPT

## 2022-10-02 RX ORDER — NICOTINE POLACRILEX 2 MG
0 GUM BUCCAL
Qty: 0 | Refills: 0 | DISCHARGE
Start: 2022-10-02

## 2022-10-02 RX ORDER — CAPTOPRIL 12.5 MG/1
6.25 TABLET ORAL DAILY
Refills: 0 | Status: DISCONTINUED | OUTPATIENT
Start: 2022-10-03 | End: 2022-10-03

## 2022-10-02 RX ORDER — LACTOBACILLUS ACIDOPHILUS 100MM CELL
1 CAPSULE ORAL DAILY
Refills: 0 | Status: DISCONTINUED | OUTPATIENT
Start: 2022-10-02 | End: 2022-10-07

## 2022-10-02 RX ORDER — ACETAMINOPHEN 500 MG
2 TABLET ORAL
Qty: 0 | Refills: 0 | DISCHARGE
Start: 2022-10-02

## 2022-10-02 RX ORDER — SODIUM BICARBONATE 1 MEQ/ML
650 SYRINGE (ML) INTRAVENOUS THREE TIMES A DAY
Refills: 0 | Status: DISCONTINUED | OUTPATIENT
Start: 2022-10-02 | End: 2022-10-07

## 2022-10-02 RX ORDER — SENNA PLUS 8.6 MG/1
2 TABLET ORAL
Qty: 0 | Refills: 0 | DISCHARGE
Start: 2022-10-02

## 2022-10-02 RX ORDER — CEPHALEXIN 500 MG
500 CAPSULE ORAL EVERY 12 HOURS
Refills: 0 | Status: COMPLETED | OUTPATIENT
Start: 2022-10-02 | End: 2022-10-07

## 2022-10-02 RX ORDER — ALLOPURINOL 300 MG
100 TABLET ORAL DAILY
Refills: 0 | Status: DISCONTINUED | OUTPATIENT
Start: 2022-10-02 | End: 2022-10-07

## 2022-10-02 RX ORDER — POLYETHYLENE GLYCOL 3350 17 G/17G
17 POWDER, FOR SOLUTION ORAL
Qty: 0 | Refills: 0 | DISCHARGE
Start: 2022-10-02

## 2022-10-02 RX ADMIN — Medication 1 PATCH: at 11:11

## 2022-10-02 RX ADMIN — Medication 75 MICROGRAM(S): at 05:35

## 2022-10-02 RX ADMIN — Medication 650 MILLIGRAM(S): at 14:07

## 2022-10-02 RX ADMIN — SENNA PLUS 2 TABLET(S): 8.6 TABLET ORAL at 22:02

## 2022-10-02 RX ADMIN — HEPARIN SODIUM 5000 UNIT(S): 5000 INJECTION INTRAVENOUS; SUBCUTANEOUS at 22:03

## 2022-10-02 RX ADMIN — CARVEDILOL PHOSPHATE 3.12 MILLIGRAM(S): 80 CAPSULE, EXTENDED RELEASE ORAL at 05:35

## 2022-10-02 RX ADMIN — CARVEDILOL PHOSPHATE 3.12 MILLIGRAM(S): 80 CAPSULE, EXTENDED RELEASE ORAL at 18:23

## 2022-10-02 RX ADMIN — Medication 650 MILLIGRAM(S): at 22:02

## 2022-10-02 RX ADMIN — Medication 10 MILLIGRAM(S): at 05:35

## 2022-10-02 RX ADMIN — Medication 500 MILLIGRAM(S): at 18:23

## 2022-10-02 RX ADMIN — HEPARIN SODIUM 5000 UNIT(S): 5000 INJECTION INTRAVENOUS; SUBCUTANEOUS at 14:08

## 2022-10-02 RX ADMIN — CAPTOPRIL 6.25 MILLIGRAM(S): 12.5 TABLET ORAL at 05:36

## 2022-10-02 RX ADMIN — Medication 1 PATCH: at 11:15

## 2022-10-02 RX ADMIN — Medication 3 MILLIGRAM(S): at 22:03

## 2022-10-02 RX ADMIN — Medication 1 PATCH: at 07:22

## 2022-10-02 RX ADMIN — Medication 100 MILLIGRAM(S): at 14:07

## 2022-10-02 RX ADMIN — HEPARIN SODIUM 5000 UNIT(S): 5000 INJECTION INTRAVENOUS; SUBCUTANEOUS at 05:35

## 2022-10-02 NOTE — PROGRESS NOTE ADULT - ASSESSMENT
72F CAD, Lupus, Hypothyroidism, h/o MCTD with Raynaud's and digital cold sensitivity, HTN, CKD3, valvular heart disease, and recent COVID in June 2022 admitted to OSH for AHRF, now with c/f scleroderma renal crisis transferred to [facility] for rheum eval, renal bx.

## 2022-10-02 NOTE — PROGRESS NOTE ADULT - SUBJECTIVE AND OBJECTIVE BOX
**************************************  Moe Neha, PGY-1  **************************************    INTERVAL HPI/OVERNIGHT EVENTS:  Patient was seen and examined at bedside. As per nurse and patient, no o/n events, patient resting comfortably. No complaints at this time. Patient denies: fever, chills, dizziness, weakness, HA, Changes in vision, CP, palpitations, SOB, cough, N/V/D/C, dysuria, changes in bowel movements, LE edema. ROS otherwise negative.    VITAL SIGNS:  T(F): 98.4 (10-02-22 @ 04:39)  HR: 63 (10-02-22 @ 04:39)  BP: 127/60 (10-02-22 @ 04:39)  RR: 18 (10-02-22 @ 04:39)  SpO2: 96% (10-02-22 @ 04:39)  Wt(kg): --    PHYSICAL EXAM:    GENERAL: NAD, lying in bed comfortably.  HEAD:  Atraumatic, normocephalic.  CHEST/LUNG: CTAB. No rales, rhonchi, wheezing, or rubs. Unlabored respirations.  HEART: RRR, no M/R/G, normal S1/S2.  ABDOMEN: Soft, nontender, nondistended. Normoactive bowel sounds.  EXTREMITIES:  2+ peripheral pulses b/l. No clubbing, cyanosis, or edema.  MSK: No gross deformities noted.   NEURO:  AAOx3, no focal deficits.   SKIN: No rashes or lesions.  PSYCH: Normal mood, affect.        MEDICATIONS  (STANDING):  captopril 6.25 milliGRAM(s) Oral daily  carvedilol 3.125 milliGRAM(s) Oral every 12 hours  heparin   Injectable 5000 Unit(s) SubCutaneous every 8 hours  levothyroxine 75 MICROGram(s) Oral daily  nicotine -  14 mG/24Hr(s) Patch 1 Patch Transdermal daily  polyethylene glycol 3350 17 Gram(s) Oral daily  predniSONE   Tablet 10 milliGRAM(s) Oral daily  senna 2 Tablet(s) Oral at bedtime    MEDICATIONS  (PRN):  acetaminophen     Tablet .. 650 milliGRAM(s) Oral every 6 hours PRN Temp greater or equal to 38C (100.4F), Mild Pain (1 - 3)  melatonin 3 milliGRAM(s) Oral at bedtime PRN Insomnia  nitroglycerin     SubLingual 0.4 milliGRAM(s) SubLingual every 5 minutes PRN Chest Pain      Allergies    hydrALAZINE (Angioedema; Rash)    Intolerances        LABS:                        10.3   7.93  )-----------( 249      ( 01 Oct 2022 07:30 )             31.1     10-01    129<L>  |  97  |  91<H>  ----------------------------<  94  3.8   |  18<L>  |  2.63<H>    Ca    9.4      01 Oct 2022 07:28  Phos  5.1     10-01  Mg     2.3     10-01    TPro  7.3  /  Alb  x   /  TBili  x   /  DBili  x   /  AST  x   /  ALT  x   /  AlkPhos  x   10-01          RADIOLOGY & ADDITIONAL TESTS:  Reviewed [Right] : right hand dominant [FreeTextEntry1] : Patient is a RHD 53 year old male who presents with left hand weakness and paresthesias. He describes his symptoms as generalized weakness and "deadness" in the fingers, which he agrees is like decreased sensation. He believes that it affects the entire hand, however it is most pronounced in the little and ring finger. He does not feel that he has the same level of strength when he actively crosses the two fingers as compared to his ability to do so on with the right hand. He is s/p SLAP repair of the left shoulder by Dr. Juan Carlos Amin x 5 years ago. He feels that the symptoms in the left hand developed following the surgery and may be due to ulnar nerve palsy. \par An EMG of the upper extremities was previously performed by a neurologist, Dr. Mclean. He states that the procedure was done to assess for something in the neck. He does not have the report with him today, nor does he remember the test findings.

## 2022-10-02 NOTE — PROGRESS NOTE ADULT - SUBJECTIVE AND OBJECTIVE BOX
Denies CP, SOB, no N/V    Vital Signs Last 24 Hrs  T(C): 36.7 (10-02-22 @ 12:25), Max: 37.2 (10-01-22 @ 21:48)  T(F): 98 (10-02-22 @ 12:25), Max: 98.9 (10-01-22 @ 21:48)  HR: 60 (10-02-22 @ 12:25) (60 - 71)  BP: 129/61 (10-02-22 @ 12:25) (127/60 - 157/65)  RR: 18 (10-02-22 @ 12:25) (18 - 18)  SpO2: 99% (10-02-22 @ 12:25) (96% - 99%)    s1s2  b/l air entry  soft, ND  no edema LE, cellulitis LUE  AO                                        10.1   7.31  )-----------( 244      ( 02 Oct 2022 07:38 )             31.0     02 Oct 2022 07:35    129    |  98     |  88     ----------------------------<  84     3.9     |  16     |  2.54     Ca    9.4        02 Oct 2022 07:35  Phos  4.5       02 Oct 2022 07:35  Mg     2.2       02 Oct 2022 07:35    TPro  7.3    /  Alb  x      /  TBili  x      /  DBili  x      /  AST  x      /  ALT  x      /  AlkPhos  x      01 Oct 2022 07:32    LIVER FUNCTIONS - ( 01 Oct 2022 07:32 )  Alb: x     / Pro: 7.3 g/dL / ALK PHOS: x     / ALT: x     / AST: x     / GGT: x           acetaminophen     Tablet .. 650 milliGRAM(s) Oral every 6 hours PRN  allopurinol 100 milliGRAM(s) Oral daily  carvedilol 3.125 milliGRAM(s) Oral every 12 hours  cephalexin 500 milliGRAM(s) Oral every 12 hours  heparin   Injectable 5000 Unit(s) SubCutaneous every 8 hours  levothyroxine 75 MICROGram(s) Oral daily  melatonin 3 milliGRAM(s) Oral at bedtime PRN  nicotine -  14 mG/24Hr(s) Patch 1 Patch Transdermal daily  nitroglycerin     SubLingual 0.4 milliGRAM(s) SubLingual every 5 minutes PRN  polyethylene glycol 3350 17 Gram(s) Oral daily  predniSONE   Tablet 10 milliGRAM(s) Oral daily  senna 2 Tablet(s) Oral at bedtime  sodium bicarbonate 650 milliGRAM(s) Oral three times a day    Impression/Plan:    CM, mod AR, MR  Hx SLE, MCTD, concern for Scleroderma w/Raynaud  Rheum input appr  Adm w/SOB, improved  BP presently stable  NED in setting of CM, valvular heart disease, CTD (Cr 1.19 - 6/4/22, Cr 0.94 - 10/25/21)  Started on ACE  No overt volume overload  Cr has been fairly stable  Na Bicarb added  Mild hyponatremia, stable, will follow  BMP daily  Avoid nephrotoxins  Plan for renal biopsy 10/4/22 to help guide therapy   D/w pt and family at bedside    [phone]

## 2022-10-02 NOTE — PROGRESS NOTE ADULT - ATTENDING COMMENTS
Briefly 72F with hx of SLE, MCTD with AHRF with acute on chronic renal disease here for further evaluation of acute kidney injury. Ddx includes scleroderma renal crisis, less likely lupus nephritis. Will require kidney biopsy for further determination. IR consulted, plan for kidney biopsy 10/4. Of note, she became significantly hypotensive after starting captopril 50 mg daily. Discussed with nephrology attending Dr. Vallejo - restarted captopril at low dose, monitor BP closely. She is tolerating lower dose of captopril.     Agree with plan above. Discussed with HS5.

## 2022-10-02 NOTE — PROGRESS NOTE ADULT - PROBLEM SELECTOR PLAN 3
- Confirmed with her Rheumatologist that she has features of Scleroderma and SLE   - Labs and exam concerning for Scleroderma Renal Crisis   - c/w Prednisone 10 mg daily  - rheum c/s as above  - obtain collateral from OP rheum

## 2022-10-02 NOTE — PROGRESS NOTE ADULT - PROBLEM SELECTOR PLAN 1
Ddx bilateral renal artery stenosis vs scleroderma renal crisis   - Nephrology following, recs appreciated: per nephrology NED in setting of CM, valvular heart disease, CTD (Cr 1.19 - 6/4/22, Cr 0.94 - 10/25/21)  - Started on ACE  - No overt volume overload  - BMP daily  - Avoid nephrotoxins  - BP presently stable  - rheum following, recs appreciated  - US duplex kidneys w/o SHILA  - Plan for renal bx with IR 10/4

## 2022-10-03 DIAGNOSIS — E87.1 HYPO-OSMOLALITY AND HYPONATREMIA: ICD-10-CM

## 2022-10-03 DIAGNOSIS — Z29.9 ENCOUNTER FOR PROPHYLACTIC MEASURES, UNSPECIFIED: ICD-10-CM

## 2022-10-03 LAB
ANION GAP SERPL CALC-SCNC: 16 MMOL/L — SIGNIFICANT CHANGE UP (ref 5–17)
B2 GLYCOPROT1 AB SER QL: NEGATIVE — SIGNIFICANT CHANGE UP
BUN SERPL-MCNC: 83 MG/DL — HIGH (ref 7–23)
CALCIUM SERPL-MCNC: 9.3 MG/DL — SIGNIFICANT CHANGE UP (ref 8.4–10.5)
CHLORIDE SERPL-SCNC: 97 MMOL/L — SIGNIFICANT CHANGE UP (ref 96–108)
CO2 SERPL-SCNC: 17 MMOL/L — LOW (ref 22–31)
CREAT SERPL-MCNC: 2.45 MG/DL — HIGH (ref 0.5–1.3)
CREATININE, URINE RESULT: 64 MG/DL — SIGNIFICANT CHANGE UP
DRVVT SCREEN TO CONFIRM RATIO: SIGNIFICANT CHANGE UP
EGFR: 20 ML/MIN/1.73M2 — LOW
GAMMA INTERFERON BACKGROUND BLD IA-ACNC: 0.01 IU/ML — SIGNIFICANT CHANGE UP
GLUCOSE SERPL-MCNC: 89 MG/DL — SIGNIFICANT CHANGE UP (ref 70–99)
HCT VFR BLD CALC: 29.4 % — LOW (ref 34.5–45)
HGB BLD-MCNC: 9.8 G/DL — LOW (ref 11.5–15.5)
LA NT DPL PPP QL: 32.8 SEC — SIGNIFICANT CHANGE UP
M TB IFN-G BLD-IMP: NEGATIVE — SIGNIFICANT CHANGE UP
M TB IFN-G CD4+ BCKGRND COR BLD-ACNC: 0 IU/ML — SIGNIFICANT CHANGE UP
M TB IFN-G CD4+CD8+ BCKGRND COR BLD-ACNC: 0 IU/ML — SIGNIFICANT CHANGE UP
MAGNESIUM SERPL-MCNC: 2.1 MG/DL — SIGNIFICANT CHANGE UP (ref 1.6–2.6)
MCHC RBC-ENTMCNC: 31.2 PG — SIGNIFICANT CHANGE UP (ref 27–34)
MCHC RBC-ENTMCNC: 33.3 GM/DL — SIGNIFICANT CHANGE UP (ref 32–36)
MCV RBC AUTO: 93.6 FL — SIGNIFICANT CHANGE UP (ref 80–100)
NORMALIZED SCT PPP-RTO: 0.85 RATIO — SIGNIFICANT CHANGE UP (ref 0–1.16)
NORMALIZED SCT PPP-RTO: SIGNIFICANT CHANGE UP
NRBC # BLD: 0 /100 WBCS — SIGNIFICANT CHANGE UP (ref 0–0)
PHOSPHATE SERPL-MCNC: 3.9 MG/DL — SIGNIFICANT CHANGE UP (ref 2.5–4.5)
PLATELET # BLD AUTO: 275 K/UL — SIGNIFICANT CHANGE UP (ref 150–400)
POTASSIUM SERPL-MCNC: 4 MMOL/L — SIGNIFICANT CHANGE UP (ref 3.5–5.3)
POTASSIUM SERPL-SCNC: 4 MMOL/L — SIGNIFICANT CHANGE UP (ref 3.5–5.3)
PROT ?TM UR-MCNC: 25 MG/DL — HIGH (ref 0–12)
QUANT TB PLUS MITOGEN MINUS NIL: 2.55 IU/ML — SIGNIFICANT CHANGE UP
RBC # BLD: 3.14 M/UL — LOW (ref 3.8–5.2)
RBC # FLD: 14.5 % — SIGNIFICANT CHANGE UP (ref 10.3–14.5)
RNAP III AB SER-ACNC: 101 UNITS — HIGH (ref 0–19)
SODIUM SERPL-SCNC: 130 MMOL/L — LOW (ref 135–145)
WBC # BLD: 7.08 K/UL — SIGNIFICANT CHANGE UP (ref 3.8–10.5)
WBC # FLD AUTO: 7.08 K/UL — SIGNIFICANT CHANGE UP (ref 3.8–10.5)

## 2022-10-03 PROCEDURE — 99233 SBSQ HOSP IP/OBS HIGH 50: CPT

## 2022-10-03 RX ORDER — CAPTOPRIL 12.5 MG/1
6.25 TABLET ORAL
Refills: 0 | Status: DISCONTINUED | OUTPATIENT
Start: 2022-10-03 | End: 2022-10-04

## 2022-10-03 RX ADMIN — CARVEDILOL PHOSPHATE 3.12 MILLIGRAM(S): 80 CAPSULE, EXTENDED RELEASE ORAL at 16:53

## 2022-10-03 RX ADMIN — Medication 500 MILLIGRAM(S): at 16:52

## 2022-10-03 RX ADMIN — Medication 1 PATCH: at 07:10

## 2022-10-03 RX ADMIN — Medication 10 MILLIGRAM(S): at 06:28

## 2022-10-03 RX ADMIN — CAPTOPRIL 6.25 MILLIGRAM(S): 12.5 TABLET ORAL at 06:26

## 2022-10-03 RX ADMIN — Medication 1 PATCH: at 07:11

## 2022-10-03 RX ADMIN — CARVEDILOL PHOSPHATE 3.12 MILLIGRAM(S): 80 CAPSULE, EXTENDED RELEASE ORAL at 06:27

## 2022-10-03 RX ADMIN — Medication 650 MILLIGRAM(S): at 06:27

## 2022-10-03 RX ADMIN — Medication 1 TABLET(S): at 11:08

## 2022-10-03 RX ADMIN — Medication 75 MICROGRAM(S): at 06:27

## 2022-10-03 RX ADMIN — Medication 650 MILLIGRAM(S): at 14:14

## 2022-10-03 RX ADMIN — Medication 500 MILLIGRAM(S): at 06:27

## 2022-10-03 RX ADMIN — Medication 1 PATCH: at 11:08

## 2022-10-03 RX ADMIN — CAPTOPRIL 6.25 MILLIGRAM(S): 12.5 TABLET ORAL at 16:52

## 2022-10-03 RX ADMIN — Medication 1 PATCH: at 11:06

## 2022-10-03 RX ADMIN — Medication 100 MILLIGRAM(S): at 11:07

## 2022-10-03 RX ADMIN — HEPARIN SODIUM 5000 UNIT(S): 5000 INJECTION INTRAVENOUS; SUBCUTANEOUS at 06:28

## 2022-10-03 RX ADMIN — Medication 650 MILLIGRAM(S): at 21:32

## 2022-10-03 NOTE — PROGRESS NOTE ADULT - PROBLEM SELECTOR PLAN 8
DVT PPx: subQ heparin  Diet: regular; NPO after midnight 10/3 for IR bx 10/4  Dispo: pending clinical improvement

## 2022-10-03 NOTE — PROGRESS NOTE ADULT - ASSESSMENT
72F CAD, Lupus, Hypothyroidism, h/o MCTD with Raynaud's and digital cold sensitivity, HTN, CKD3, valvular heart disease, and recent COVID in June 2022 admitted to OSH for AHRF, now with c/f scleroderma renal crisis transferred to [facility] for rheum eval, renal bx. 72F PMHx CAD, Lupus, Hypothyroidism, MCTD with Raynaud's and digital cold sensitivity, HTN, CKD3, valvular heart disease, and recent COVID in June 2022 admitted to OSH for AHRF, now with c/f scleroderma renal crisis, transferred to [facility] for rheum eval and renal biopsy.

## 2022-10-03 NOTE — PROGRESS NOTE ADULT - SUBJECTIVE AND OBJECTIVE BOX
*******************************  Jesús Fonseca MD (PGY-1)  Internal Medicine  Contact via [employer] TEAMS  [facility] Pager: [phone]  [facility] Pager: [phone]  *******************************    PROGRESS NOTE:     Patient is a 72y old  Female who presents with a chief complaint of SOB (02 Oct 2022 16:04)    SUBJECTIVE / OVERNIGHT EVENTS: Patient seen and examined at bedside. No acute overnight events. This morning, the patient is comfortable and doing well. No acute complaints. Denies fevers, chills, N/V/D, chest pain, SOB, abdominal pain.    MEDICATIONS  (STANDING):  allopurinol 100 milliGRAM(s) Oral daily  captopril 6.25 milliGRAM(s) Oral daily  carvedilol 3.125 milliGRAM(s) Oral every 12 hours  cephalexin 500 milliGRAM(s) Oral every 12 hours  heparin   Injectable 5000 Unit(s) SubCutaneous every 8 hours  lactobacillus acidophilus 1 Tablet(s) Oral daily  levothyroxine 75 MICROGram(s) Oral daily  nicotine -  14 mG/24Hr(s) Patch 1 Patch Transdermal daily  polyethylene glycol 3350 17 Gram(s) Oral daily  predniSONE   Tablet 10 milliGRAM(s) Oral daily  senna 2 Tablet(s) Oral at bedtime  sodium bicarbonate 650 milliGRAM(s) Oral three times a day    MEDICATIONS  (PRN):  acetaminophen     Tablet .. 650 milliGRAM(s) Oral every 6 hours PRN Temp greater or equal to 38C (100.4F), Mild Pain (1 - 3)  melatonin 3 milliGRAM(s) Oral at bedtime PRN Insomnia  nitroglycerin     SubLingual 0.4 milliGRAM(s) SubLingual every 5 minutes PRN Chest Pain    CAPILLARY BLOOD GLUCOSE    I&O's Summary    02 Oct 2022 07:01  -  03 Oct 2022 07:00  --------------------------------------------------------  IN: 240 mL / OUT: 0 mL / NET: 240 mL    PHYSICAL EXAM:  Vital Signs Last 24 Hrs  T(C): 37.2 (03 Oct 2022 05:12), Max: 37.2 (03 Oct 2022 05:12)  T(F): 99 (03 Oct 2022 05:12), Max: 99 (03 Oct 2022 05:12)  HR: 64 (03 Oct 2022 05:12) (60 - 71)  BP: 152/75 (03 Oct 2022 05:12) (129/61 - 157/58)  BP(mean): --  RR: 18 (03 Oct 2022 05:12) (18 - 18)  SpO2: 98% (03 Oct 2022 05:12) (98% - 99%)    Parameters below as of 03 Oct 2022 05:12  Patient On (Oxygen Delivery Method): room air    GENERAL: NAD, lying in bed comfortably  HEART: S1, S2, Regular rate and rhythm, no murmurs, rubs, or gallops  LUNGS: Unlabored respirations, clear to auscultation bilaterally, no crackles, wheezing, or rhonchi  ABDOMEN: Soft, nontender, nondistended, +BS  EXTREMITIES: 2+ peripheral pulses bilaterally. No clubbing, cyanosis, or edema  NERVOUS SYSTEM:  A&Ox3, no focal deficits   SKIN: No rashes or lesions    LABS:                        10.1   7.31  )-----------( 244      ( 02 Oct 2022 07:38 )             31.0     10-02    129<L>  |  98  |  88<H>  ----------------------------<  84  3.9   |  16<L>  |  2.54<H>    Ca    9.4      02 Oct 2022 07:35  Phos  4.5     10-02  Mg     2.2     10-02    TPro  7.3  /  Alb  x   /  TBili  x   /  DBili  x   /  AST  x   /  ALT  x   /  AlkPhos  x   10-01    RADIOLOGY & ADDITIONAL TESTS:  Results Reviewed:   Imaging Personally Reviewed:  Electrocardiogram Personally Reviewed:  Tele:    COORDINATION OF CARE:  Care Discussed with Consultants/Other Providers [Y/N]:  Prior or Outpatient Records Reviewed [Y/N]: *******************************  Jesús Fonseca MD (PGY-1)  Internal Medicine  Contact via [employer] TEAMS  [facility] Pager: [phone]  [facility] Pager: [phone]  *******************************    PROGRESS NOTE:     Patient is a 72y old  Female who presents with a chief complaint of SOB (02 Oct 2022 16:04)    SUBJECTIVE / OVERNIGHT EVENTS: Patient seen and examined at bedside. No acute overnight events. This morning, the patient is comfortable and doing well. No acute complaints. Denies fevers, chills, N/V/D, chest pain, SOB, abdominal pain.    MEDICATIONS  (STANDING):  allopurinol 100 milliGRAM(s) Oral daily  captopril 6.25 milliGRAM(s) Oral two times a day  carvedilol 3.125 milliGRAM(s) Oral every 12 hours  cephalexin 500 milliGRAM(s) Oral every 12 hours  lactobacillus acidophilus 1 Tablet(s) Oral daily  levothyroxine 75 MICROGram(s) Oral daily  nicotine -  14 mG/24Hr(s) Patch 1 Patch Transdermal daily  polyethylene glycol 3350 17 Gram(s) Oral daily  predniSONE   Tablet 10 milliGRAM(s) Oral daily  senna 2 Tablet(s) Oral at bedtime  sodium bicarbonate 650 milliGRAM(s) Oral three times a day    MEDICATIONS  (PRN):  acetaminophen     Tablet .. 650 milliGRAM(s) Oral every 6 hours PRN Temp greater or equal to 38C (100.4F), Mild Pain (1 - 3)  melatonin 3 milliGRAM(s) Oral at bedtime PRN Insomnia  nitroglycerin     SubLingual 0.4 milliGRAM(s) SubLingual every 5 minutes PRN Chest Pain       CAPILLARY BLOOD GLUCOSE    I&O's Summary    02 Oct 2022 07:01  -  03 Oct 2022 07:00  --------------------------------------------------------  IN: 240 mL / OUT: 0 mL / NET: 240 mL    PHYSICAL EXAM:  Vital Signs Last 24 Hrs  T(C): 37.2 (03 Oct 2022 05:12), Max: 37.2 (03 Oct 2022 05:12)  T(F): 99 (03 Oct 2022 05:12), Max: 99 (03 Oct 2022 05:12)  HR: 64 (03 Oct 2022 05:12) (60 - 71)  BP: 152/75 (03 Oct 2022 05:12) (129/61 - 157/58)  BP(mean): --  RR: 18 (03 Oct 2022 05:12) (18 - 18)  SpO2: 98% (03 Oct 2022 05:12) (98% - 99%)    Parameters below as of 03 Oct 2022 05:12  Patient On (Oxygen Delivery Method): room air    GENERAL: NAD, lying in bed comfortably  HEART: S1, S2, Regular rate and rhythm, no murmurs, rubs, or gallops  LUNGS: Unlabored respirations, clear to auscultation bilaterally, no crackles, wheezing, or rhonchi  ABDOMEN: Soft, nontender, nondistended, +BS  EXTREMITIES: 2+ peripheral pulses bilaterally. No clubbing, cyanosis, or edema  NERVOUS SYSTEM:  A&Ox3, no focal deficits   SKIN: No rashes or lesions    LABS:                        9.8    7.08  )-----------( 275      ( 03 Oct 2022 07:31 )             29.4     10-03    130<L>  |  97  |  83<H>  ----------------------------<  89  4.0   |  17<L>  |  2.45<H>    Ca    9.3      03 Oct 2022 07:37  Phos  3.9     10-03  Mg     2.1     10-03    RADIOLOGY & ADDITIONAL TESTS:  Results Reviewed:   Imaging Personally Reviewed:  Electrocardiogram Personally Reviewed:  Tele:    COORDINATION OF CARE:  Care Discussed with Consultants/Other Providers [Y/N]:  Prior or Outpatient Records Reviewed [Y/N]:

## 2022-10-03 NOTE — PROGRESS NOTE ADULT - ATTENDING COMMENTS
72F with hx of SLE, MCTD with AHRF with acute on chronic renal disease here for further evaluation of acute kidney injury. Ddx includes scleroderma renal crisis, less likely lupus nephritis. Complement levels wnl. Cr improving. Plan kidney biopsy 10/4 by IR. Complicated with ep of hypotension when captopril 50mg daily started. Restarted captopril 6.25mg BID and uptitrate as needed. No further inpatient rheum workup    d/w HS5    Deepika Alejandra MD  Division of Hospital Medicine  Available on [employer] Teams

## 2022-10-03 NOTE — PROGRESS NOTE ADULT - PROBLEM SELECTOR PLAN 2
Pt. w/ mild hyponatremia (129-134) since admission  - nephrology following; appreciate recs  - f/u urine studies

## 2022-10-03 NOTE — PROGRESS NOTE ADULT - PROBLEM SELECTOR PLAN 5
- c/w captopril 6.25 qd  - c/w coreg 3.125 mg bid - increase captopril to 6.25mg BID  - c/w coreg 3.125 mg bid

## 2022-10-03 NOTE — PROGRESS NOTE ADULT - PROBLEM SELECTOR PLAN 1
SCr 2.80 on admssion (baseline 0.94 10/2021 and 1.19 6/2022); DDx includes b/l renal artery stenosis vs scleroderma renal crisis vs. lupus nephritis  - no overt volume overload noted; BP presently stable  - nephrology following, recs appreciated: per nephrology, NED in setting of CM, valvular heart disease  - rheum following, recs appreciated  - US duplex kidneys (9/30) w/o SHILA  - c/w captopril 6.25mg qd  - monitor BMP daily; avoid nephrotoxins  - plan for renal bx with IR 10/4; NPO after midnight SCr 2.80 on admission (baseline 0.94 10/2021 and 1.19 6/2022); DDx includes b/l renal artery stenosis vs scleroderma renal crisis vs. lupus nephritis  - no overt volume overload noted; BP presently stable  - nephrology following, appreciate recs: per nephrology, NED in setting of CM, valvular heart disease vs CTD  - rheum following, appreciate recs- no further workup indicated at this time  - US duplex kidneys (9/30) w/o SHILA  - increase captopril to 6.25mg BID  - monitor BMP daily; avoid nephrotoxins  - plan for renal bx with IR 10/4; NPO after midnight and STAT AM CBC, CMP, coags ordered

## 2022-10-03 NOTE — PROGRESS NOTE ADULT - SUBJECTIVE AND OBJECTIVE BOX
Denies CP, SOB, no N/V    Vital Signs Last 24 Hrs  T(C): 36.5 (10-03-22 @ 11:12), Max: 37.2 (10-03-22 @ 05:12)  T(F): 97.7 (10-03-22 @ 11:12), Max: 99 (10-03-22 @ 05:12)  HR: 61 (10-03-22 @ 11:12) (61 - 71)  BP: 129/60 (10-03-22 @ 11:12) (129/60 - 157/58)  RR: 18 (10-03-22 @ 11:12) (18 - 18)  SpO2: 97% (10-03-22 @ 11:12) (97% - 98%)    s1s2  b/l air entry  soft, ND  no edema LE                                           9.8    7.08  )-----------( 275      ( 03 Oct 2022 07:31 )             29.4     03 Oct 2022 07:37    130    |  97     |  83     ----------------------------<  89     4.0     |  17     |  2.45     Ca    9.3        03 Oct 2022 07:37  Phos  3.9       03 Oct 2022 07:37  Mg     2.1       03 Oct 2022 07:37    acetaminophen     Tablet .. 650 milliGRAM(s) Oral every 6 hours PRN  allopurinol 100 milliGRAM(s) Oral daily  captopril 6.25 milliGRAM(s) Oral two times a day  carvedilol 3.125 milliGRAM(s) Oral every 12 hours  cephalexin 500 milliGRAM(s) Oral every 12 hours  lactobacillus acidophilus 1 Tablet(s) Oral daily  levothyroxine 75 MICROGram(s) Oral daily  melatonin 3 milliGRAM(s) Oral at bedtime PRN  nicotine -  14 mG/24Hr(s) Patch 1 Patch Transdermal daily  nitroglycerin     SubLingual 0.4 milliGRAM(s) SubLingual every 5 minutes PRN  polyethylene glycol 3350 17 Gram(s) Oral daily  predniSONE   Tablet 10 milliGRAM(s) Oral daily  senna 2 Tablet(s) Oral at bedtime  sodium bicarbonate 650 milliGRAM(s) Oral three times a day    Impression/Plan:    CM, mod AR, MR  Hx SLE, MCTD, concern for Scleroderma w/Raynaud  Rheum input appr  Adm w/SOB, improved  BP presently stable  NED in setting of CM, valvular heart disease, CTD (Cr 1.19 - 6/4/22, Cr 0.94 - 10/25/21)  Started on ACE  No overt volume overload  Cr has been fairly stable  Na Bicarb added  Mild hyponatremia, stable, will follow  BMP daily  Avoid nephrotoxins  Plan for renal biopsy 10/4/22 to help guide therapy     [phone]

## 2022-10-03 NOTE — PROGRESS NOTE ADULT - PROBLEM SELECTOR PLAN 3
- Resolved; now on RA  - CT Chest (9/22): pulmonary edema   - TTE (9/22): diastolic Dysfunction and EF 45-50%  - given history of MCTD, c/f ILD and PAH; was followed by Pulmonary and Cardiology at OSH Resolved; now on RA  - CT Chest (9/22): pulmonary edema   - TTE (9/22): diastolic Dysfunction and EF 45-50%  - given history of MCTD, c/f ILD and PAH; was followed by Pulmonary and Cardiology at OSH

## 2022-10-03 NOTE — PROGRESS NOTE ADULT - PROBLEM SELECTOR PLAN 4
Confirmed with her Rheumatologist that she has features of Scleroderma and SLE  - rheum following; appreciate recs   - labs and exam concerning for possible Scleroderma Renal Crisis   - c/w Prednisone 10 mg daily Confirmed with her rheumatologist that she has features of scleroderma and SLE  - rheum following; appreciate recs   - labs and exam concerning for possible Scleroderma Renal Crisis   - c/w Prednisone 10 mg daily

## 2022-10-03 NOTE — PROGRESS NOTE ADULT - PROBLEM SELECTOR PLAN 7
History of PCI   - c/w captopril 6.25 qd  - c/w coreg 3.125 mg bid History of PCI   - increase captopril to 6.25mg BID  - c/w coreg 3.125 mg bid

## 2022-10-04 LAB
ALBUMIN SERPL ELPH-MCNC: 3.7 G/DL — SIGNIFICANT CHANGE UP (ref 3.3–5)
ALP SERPL-CCNC: 43 U/L — SIGNIFICANT CHANGE UP (ref 40–120)
ALT FLD-CCNC: 23 U/L — SIGNIFICANT CHANGE UP (ref 10–45)
ANION GAP SERPL CALC-SCNC: 15 MMOL/L — SIGNIFICANT CHANGE UP (ref 5–17)
APTT BLD: 25.7 SEC — LOW (ref 27.5–35.5)
AST SERPL-CCNC: 16 U/L — SIGNIFICANT CHANGE UP (ref 10–40)
BILIRUB SERPL-MCNC: 0.4 MG/DL — SIGNIFICANT CHANGE UP (ref 0.2–1.2)
BLD GP AB SCN SERPL QL: NEGATIVE — SIGNIFICANT CHANGE UP
BUN SERPL-MCNC: 75 MG/DL — HIGH (ref 7–23)
CALCIUM SERPL-MCNC: 9.3 MG/DL — SIGNIFICANT CHANGE UP (ref 8.4–10.5)
CARDIOLIPIN AB SER-ACNC: NEGATIVE — SIGNIFICANT CHANGE UP
CHLORIDE SERPL-SCNC: 99 MMOL/L — SIGNIFICANT CHANGE UP (ref 96–108)
CO2 SERPL-SCNC: 17 MMOL/L — LOW (ref 22–31)
CREAT SERPL-MCNC: 2.12 MG/DL — HIGH (ref 0.5–1.3)
EGFR: 24 ML/MIN/1.73M2 — LOW
GLUCOSE SERPL-MCNC: 88 MG/DL — SIGNIFICANT CHANGE UP (ref 70–99)
HCT VFR BLD CALC: 29.4 % — LOW (ref 34.5–45)
HGB BLD-MCNC: 9.8 G/DL — LOW (ref 11.5–15.5)
INR BLD: 1.14 RATIO — SIGNIFICANT CHANGE UP (ref 0.88–1.16)
MCHC RBC-ENTMCNC: 31.2 PG — SIGNIFICANT CHANGE UP (ref 27–34)
MCHC RBC-ENTMCNC: 33.3 GM/DL — SIGNIFICANT CHANGE UP (ref 32–36)
MCV RBC AUTO: 93.6 FL — SIGNIFICANT CHANGE UP (ref 80–100)
MPO AB + PR3 PNL SER: SIGNIFICANT CHANGE UP
NRBC # BLD: 0 /100 WBCS — SIGNIFICANT CHANGE UP (ref 0–0)
PLATELET # BLD AUTO: 265 K/UL — SIGNIFICANT CHANGE UP (ref 150–400)
POTASSIUM SERPL-MCNC: 3.9 MMOL/L — SIGNIFICANT CHANGE UP (ref 3.5–5.3)
POTASSIUM SERPL-SCNC: 3.9 MMOL/L — SIGNIFICANT CHANGE UP (ref 3.5–5.3)
PROT SERPL-MCNC: 7.5 G/DL — SIGNIFICANT CHANGE UP (ref 6–8.3)
PROTHROM AB SERPL-ACNC: 13.2 SEC — SIGNIFICANT CHANGE UP (ref 10.5–13.4)
RBC # BLD: 3.14 M/UL — LOW (ref 3.8–5.2)
RBC # FLD: 14.4 % — SIGNIFICANT CHANGE UP (ref 10.3–14.5)
RH IG SCN BLD-IMP: POSITIVE — SIGNIFICANT CHANGE UP
SODIUM SERPL-SCNC: 131 MMOL/L — LOW (ref 135–145)
WBC # BLD: 6.75 K/UL — SIGNIFICANT CHANGE UP (ref 3.8–10.5)
WBC # FLD AUTO: 6.75 K/UL — SIGNIFICANT CHANGE UP (ref 3.8–10.5)

## 2022-10-04 PROCEDURE — 99232 SBSQ HOSP IP/OBS MODERATE 35: CPT | Mod: GC

## 2022-10-04 PROCEDURE — 99233 SBSQ HOSP IP/OBS HIGH 50: CPT | Mod: GC

## 2022-10-04 RX ORDER — CAPTOPRIL 12.5 MG/1
12.5 TABLET ORAL
Refills: 0 | Status: DISCONTINUED | OUTPATIENT
Start: 2022-10-04 | End: 2022-10-04

## 2022-10-04 RX ORDER — CAPTOPRIL 12.5 MG/1
12.5 TABLET ORAL THREE TIMES A DAY
Refills: 0 | Status: DISCONTINUED | OUTPATIENT
Start: 2022-10-04 | End: 2022-10-05

## 2022-10-04 RX ADMIN — CARVEDILOL PHOSPHATE 3.12 MILLIGRAM(S): 80 CAPSULE, EXTENDED RELEASE ORAL at 17:35

## 2022-10-04 RX ADMIN — SENNA PLUS 2 TABLET(S): 8.6 TABLET ORAL at 21:30

## 2022-10-04 RX ADMIN — Medication 500 MILLIGRAM(S): at 05:07

## 2022-10-04 RX ADMIN — Medication 1 PATCH: at 12:11

## 2022-10-04 RX ADMIN — Medication 500 MILLIGRAM(S): at 17:35

## 2022-10-04 RX ADMIN — Medication 650 MILLIGRAM(S): at 13:22

## 2022-10-04 RX ADMIN — Medication 100 MILLIGRAM(S): at 12:11

## 2022-10-04 RX ADMIN — Medication 1 PATCH: at 12:10

## 2022-10-04 RX ADMIN — Medication 1 PATCH: at 06:45

## 2022-10-04 RX ADMIN — Medication 75 MICROGRAM(S): at 05:07

## 2022-10-04 RX ADMIN — Medication 10 MILLIGRAM(S): at 05:07

## 2022-10-04 RX ADMIN — CARVEDILOL PHOSPHATE 3.12 MILLIGRAM(S): 80 CAPSULE, EXTENDED RELEASE ORAL at 05:07

## 2022-10-04 RX ADMIN — Medication 3 MILLIGRAM(S): at 23:52

## 2022-10-04 RX ADMIN — CAPTOPRIL 12.5 MILLIGRAM(S): 12.5 TABLET ORAL at 21:30

## 2022-10-04 RX ADMIN — Medication 650 MILLIGRAM(S): at 05:07

## 2022-10-04 RX ADMIN — CAPTOPRIL 12.5 MILLIGRAM(S): 12.5 TABLET ORAL at 12:12

## 2022-10-04 RX ADMIN — Medication 1 TABLET(S): at 12:12

## 2022-10-04 RX ADMIN — CAPTOPRIL 6.25 MILLIGRAM(S): 12.5 TABLET ORAL at 05:08

## 2022-10-04 RX ADMIN — Medication 650 MILLIGRAM(S): at 21:30

## 2022-10-04 NOTE — PROGRESS NOTE ADULT - PROBLEM SELECTOR PLAN 4
Confirmed with her rheumatologist that she has features of scleroderma and SLE  - rheum following; appreciate recs   - labs and exam concerning for possible scleroderma renal crisis   - c/w prednisone 10 mg daily

## 2022-10-04 NOTE — PROGRESS NOTE ADULT - PROBLEM SELECTOR PLAN 7
History of PCI   - c/w captopril 6.25mg BID  - c/w coreg 3.125 mg bid History of PCI   - increased captopril to 12.5mg TID  - c/w coreg 3.125 mg bid

## 2022-10-04 NOTE — PROGRESS NOTE ADULT - ASSESSMENT
71 yo F PMH RA, SLE, MCTD, Raynaud's, CAD, hypothyroidism, HTN, CKD3 (baseline creatinine 1.1 6/2022), COVID 6/2022 presented to [facility] w/ acute sob found to have pulmonary edema 2/2 hypertensive emergency w/ NED (creatinine 2.8) on CKD w/ concern for scleroderma renal crisis, s/p captopril initiation (9/26/22), transferred to [facility] for kidney biopsy. Rheumatology consulted for evaluation    #NED on CKD:   =Pt w/ hx of RA, SLE, MCTD (+RNP) w/ features of Raynaud's and physical exam findings consistent w/ scleroderma  =centromere and SCL-70 negative  =another consideration is paraneoplastic process presenting as scleroderma mimicker  elevated Kappa/lamda ratio, weight loss...  =negative dsDNA,  wnl and improvement in P/C ratio and creatinine stability since captopril initiation 9/26      #Overlap syndrome (SLE/SLE/MCTD)  Serology: +VICENTE 1:1280 speckled, DS-DNA 44, RNP>8, SSA>8, P/C=.7--->.3, RNP-polymerase 3 highly positive (101)  Negative: SSB, centromere, SCL-70, C3/C4  Labs: negative for SPEP/UPEP, APS labs  Symptoms: sclerodactylia, telangiectasia     Plan:  -pending myomarker panel (ordered)   -c/w prednisone 10mg qd  -plan for renal biopsy 10/4/22 w/ IR  -ensure patient has been up to date on her malignancy screening   -obtain further collateral w/ her Rheumatologist Dr. Montana Lofton MD  PGY-4 Rheumatology Fellow  Pager: [phone]   Available in teams     Note is incomplete, please wait for attending attestation   71 yo F PMH RA, SLE, MCTD, Raynaud's, CAD, hypothyroidism, HTN, CKD3 (baseline creatinine 1.1 6/2022), COVID 6/2022 presented to [facility] w/ acute sob found to have pulmonary edema 2/2 hypertensive emergency w/ NED (creatinine 2.8) on CKD w/ concern for scleroderma renal crisis, s/p captopril initiation (9/26/22), transferred to [facility] for kidney biopsy. Rheumatology consulted for evaluation    #NED on CKD:   =Pt w/ hx of RA, SLE, MCTD (+RNP) w/ features of Raynaud's and physical exam findings consistent w/ scleroderma  =centromere and SCL-70 negative  =another consideration is paraneoplastic process presenting as scleroderma mimicker  elevated Kappa/lamda ratio, weight loss...  =negative dsDNA,  wnl and improvement in P/C ratio and creatinine stability since captopril initiation 9/26      #Overlap syndrome (SLE/SLE/MCTD)  Serology: +VICENTE 1:1280 speckled, DS-DNA 44, RNP>8, SSA>8, P/C=.7--->.3, RNP-polymerase 3 highly positive (101)  Negative: SSB, centromere, SCL-70, C3/C4  Labs: negative for SPEP/UPEP, APS labs  Symptoms: sclerodactylia, telangiectasia     Plan:  -pending myomarker panel (ordered)   -On home dose of prednisone 10mg qd. Plan to talk with her rheumatologist to clarify the role of prednisone   -plan for renal biopsy 10/4/22 w/ IR  -ensure patient has been up to date on her malignancy screening   -obtain further collateral w/ her Rheumatologist Dr. Boyle    D/w Dr. Nic Lofton MD  PGY-4 Rheumatology Fellow  Pager: [phone]   Available in teams        73 yo F PMH RA, SLE, MCTD, Raynaud's, CAD, hypothyroidism, HTN, CKD3 (baseline creatinine 1.1 6/2022), COVID 6/2022 presented to [facility] w/ acute sob found to have pulmonary edema 2/2 hypertensive emergency w/ NED (creatinine 2.8) on CKD w/ concern for scleroderma renal crisis, s/p captopril initiation (9/26/22), transferred to [facility] for kidney biopsy. Rheumatology consulted for evaluation    #NED on CKD:   =Pt w/ hx of RA, SLE, MCTD (+RNP) w/ features of Raynaud's and physical exam findings consistent w/ scleroderma  =centromere and SCL-70 negative  =another consideration is paraneoplastic process presenting as scleroderma mimicker  elevated Kappa/lamda ratio, weight loss...UPEP and SPEP negative  =negative dsDNA,  wnl and improvement in P/C ratio and creatinine stability since captopril initiation 9/26      #Overlap syndrome (SLE/SLE/MCTD)  Serology: +VICENTE 1:1280 speckled, DS-DNA 44, RNP>8, SSA>8, P/C=.7--->.3, RNP-polymerase 3 highly positive (101)  Negative: SSB, centromere, SCL-70, C3/C4  Labs: negative for SPEP/UPEP, APS labs  Symptoms: sclerodactylia, telangiectasia     Plan:  -pending myomarker panel (ordered)   -On home dose of prednisone 10mg qd. Plan to talk with her rheumatologist to clarify the role of prednisone   -plan for renal biopsy 10/4/22 w/ IR  -ensure patient has been up to date on her malignancy screening   -obtain further collateral w/ her Rheumatologist Dr. Boyle  -Recommend consult with Hem/Onc for her Kappa/Lamda result     D/w Dr. Nic Lofton MD  PGY-4 Rheumatology Fellow  Pager: [phone]   Available in teams        71 yo F PMH RA, SLE, MCTD, Raynaud's, CAD, hypothyroidism, HTN, CKD3 (baseline creatinine 1.1 6/2022), COVID 6/2022 presented to [facility] w/ acute sob found to have pulmonary edema 2/2 hypertensive emergency w/ NED (creatinine 2.8) on CKD w/ concern for scleroderma renal crisis, s/p captopril initiation (9/26/22), transferred to [facility] for kidney biopsy. Rheumatology consulted for evaluation    #NED on CKD:   =Pt w/ unclear hx of RA, SLE, MCTD (+RNP) w/ features of Raynaud's and physical exam findings consistent w/ scleroderma (puffy hands, taut skin, telangiectasia)  =centromere and SCL-70 negative, RNA polymerase III now resulted 101   =another consideration is paraneoplastic process given elevated Kappa/lamda ratio and IgG Kappa on SPEP, weight loss   =improvement in P/C ratio and creatinine stability since captopril initiation 9/26    #Elevated BP  concern for SSc renal crisis   now on captopril     #Overlap syndrome (SLE/SLE/MCTD)  Serology: +VICENTE 1:1280 speckled, DS-DNA 44, RNP>8, SSA>8,, RNP-polymerase 3 highly positive (101)  Negative: SSB, centromere, SCL-70, C3/C4      Plan:  -continue with captopril, agree with increase dose to target better BP control   -On home dose of prednisone 10mg qd.  lower to 7.5 mg daily for now   would need to lower the dose (ideally discontinue) given concern for SSc renal crisis, awaiting to hear back from rheumatologist to clarify indication / duration of home prednisone   -plan for renal biopsy 10/5/22 w/ IR  -patient was advised that she should be up to date with age appropriate malignancy screening   -Consider Hem/Onc eval given elevated Kappa/Lamda ratio and IgG Kappa on SPEP    D/w Dr. Nic Lofton MD  PGY-4 Rheumatology Fellow  Pager: [phone]   Available in teams

## 2022-10-04 NOTE — PROGRESS NOTE ADULT - PROBLEM SELECTOR PLAN 1
SCr 2.80 on admission (baseline 0.94 10/2021 and 1.19 6/2022); DDx includes b/l renal artery stenosis vs scleroderma renal crisis vs. lupus nephritis  - no overt volume overload noted; BP presently stable  - nephrology following, appreciate recs: per nephrology, NED may be in setting of CM, valvular heart disease vs. CTD  - rheum following, appreciate recs- no further workup indicated at this time  - US duplex kidneys (9/30) w/o SHILA  - c/w captopril 6.25mg BID  - plan for renal bx with IR 10/4; per nephrology, can d/c after a day post bx  - monitor BMP daily; avoid nephrotoxins SCr 2.80 on admission (baseline 0.94 10/2021 and 1.19 6/2022); DDx includes b/l renal artery stenosis vs scleroderma renal crisis vs. lupus nephritis  - no overt volume overload noted; BP presently stable  - nephrology following, appreciate recs: per nephrology, NED may be in setting of CM, valvular heart disease vs. CTD  - rheum following, appreciate recs- no further workup indicated at this time  - US duplex kidneys (9/30) w/o SHILA  - pt. was due for IR renal bx 10/4; however was noted to have SBP 180s in IR suite so procedure is temporarily postponed until 10/5 if SBP<150; per nephrology, can d/c the next day after bx  - increased captopril to 12.5mg TID  - monitor BMP daily; avoid nephrotoxins

## 2022-10-04 NOTE — PROGRESS NOTE ADULT - SUBJECTIVE AND OBJECTIVE BOX
SIMBA CHEATHAM  [id number]    INTERVAL HPI/OVERNIGHT EVENTS:    Patient was evaluated at the [facility]. She is scheduled for kidney biopsy. Remains in prednisone 10 mg, and captopril 12.5 mg TID. Her creatinine remains stable.       PMHx/PSHx/FamHx/SocHx reviewed and no significant changes    REVIEW OF SYSTEMS   - reviewed with patient and  negative other than as above or previously documented.     MEDICATIONS  (STANDING):  allopurinol 100 milliGRAM(s) Oral daily  captopril 12.5 milliGRAM(s) Oral three times a day  carvedilol 3.125 milliGRAM(s) Oral every 12 hours  cephalexin 500 milliGRAM(s) Oral every 12 hours  lactobacillus acidophilus 1 Tablet(s) Oral daily  levothyroxine 75 MICROGram(s) Oral daily  nicotine -  14 mG/24Hr(s) Patch 1 Patch Transdermal daily  polyethylene glycol 3350 17 Gram(s) Oral daily  predniSONE   Tablet 10 milliGRAM(s) Oral daily  senna 2 Tablet(s) Oral at bedtime  sodium bicarbonate 650 milliGRAM(s) Oral three times a day    MEDICATIONS  (PRN):  acetaminophen     Tablet .. 650 milliGRAM(s) Oral every 6 hours PRN Temp greater or equal to 38C (100.4F), Mild Pain (1 - 3)  melatonin 3 milliGRAM(s) Oral at bedtime PRN Insomnia  nitroglycerin     SubLingual 0.4 milliGRAM(s) SubLingual every 5 minutes PRN Chest Pain      Allergies    hydrALAZINE (Angioedema; Rash)    Intolerances          Vital Signs Last 24 Hrs  T(C): 36.7 (04 Oct 2022 09:32), Max: 37.2 (03 Oct 2022 20:54)  T(F): 98 (04 Oct 2022 09:32), Max: 99 (03 Oct 2022 20:54)  HR: 78 (04 Oct 2022 09:32) (74 - 93)  BP: 145/75 (04 Oct 2022 09:32) (145/75 - 169/79)  BP(mean): --  RR: 18 (04 Oct 2022 09:32) (18 - 18)  SpO2: 97% (04 Oct 2022 09:32) (94% - 98%)    Parameters below as of 04 Oct 2022 09:32  Patient On (Oxygen Delivery Method): room air        Physical Exam:  General: NAD  HEENT: EOMI, MMM  Cardio: +S1/S2, RRR  Resp: CTA b/l  GI: +BS, soft, NT/ND  MSK: sclerodactylia b/l hands and feet. No sign of synovitis and good ROM.   Skin: telangiectasia face   Neuro: AAOx3  Psych: wnl    LABS:                        9.8    6.75  )-----------( 265      ( 04 Oct 2022 05:13 )             29.4     10-04    131<L>  |  99  |  75<H>  ----------------------------<  88  3.9   |  17<L>  |  2.12<H>    Ca    9.3      04 Oct 2022 05:13  Phos  3.9     10-03  Mg     2.1     10-03    TPro  7.5  /  Alb  3.7  /  TBili  0.4  /  DBili  x   /  AST  16  /  ALT  23  /  AlkPhos  43  10-04    PT/INR - ( 04 Oct 2022 05:14 )   PT: 13.2 sec;   INR: 1.14 ratio         PTT - ( 04 Oct 2022 05:14 )  PTT:25.7 sec        RADIOLOGY & ADDITIONAL TESTS:       SIMBA CHEATHAM  [id number]    INTERVAL HPI/OVERNIGHT EVENTS:    Patient was evaluated at the [facility]. She is scheduled for kidney biopsy. Remains in prednisone 10 mg, and captopril 12.5 mg TID. Her creatinine remains stable.       PMHx/PSHx/FamHx/SocHx reviewed and no significant changes    REVIEW OF SYSTEMS   - reviewed with patient and  negative other than as above or previously documented.     MEDICATIONS  (STANDING):  allopurinol 100 milliGRAM(s) Oral daily  captopril 12.5 milliGRAM(s) Oral three times a day  carvedilol 3.125 milliGRAM(s) Oral every 12 hours  cephalexin 500 milliGRAM(s) Oral every 12 hours  lactobacillus acidophilus 1 Tablet(s) Oral daily  levothyroxine 75 MICROGram(s) Oral daily  nicotine -  14 mG/24Hr(s) Patch 1 Patch Transdermal daily  polyethylene glycol 3350 17 Gram(s) Oral daily  predniSONE   Tablet 10 milliGRAM(s) Oral daily  senna 2 Tablet(s) Oral at bedtime  sodium bicarbonate 650 milliGRAM(s) Oral three times a day    MEDICATIONS  (PRN):  acetaminophen     Tablet .. 650 milliGRAM(s) Oral every 6 hours PRN Temp greater or equal to 38C (100.4F), Mild Pain (1 - 3)  melatonin 3 milliGRAM(s) Oral at bedtime PRN Insomnia  nitroglycerin     SubLingual 0.4 milliGRAM(s) SubLingual every 5 minutes PRN Chest Pain      Allergies    hydrALAZINE (Angioedema; Rash)    Intolerances          Vital Signs Last 24 Hrs  T(C): 36.7 (04 Oct 2022 09:32), Max: 37.2 (03 Oct 2022 20:54)  T(F): 98 (04 Oct 2022 09:32), Max: 99 (03 Oct 2022 20:54)  HR: 78 (04 Oct 2022 09:32) (74 - 93)  BP: 145/75 (04 Oct 2022 09:32) (145/75 - 169/79)  BP(mean): --  RR: 18 (04 Oct 2022 09:32) (18 - 18)  SpO2: 97% (04 Oct 2022 09:32) (94% - 98%)    Parameters below as of 04 Oct 2022 09:32  Patient On (Oxygen Delivery Method): room air        Physical Exam:  General: NAD  HEENT: EOMI, MMM  Cardio: +S1/S2, RRR  Resp: CTA b/l  GI: +BS, soft, NT/ND  MSK: puffyb/l hands No sign of synovitis and good ROM.   Skin: telangiectasia face   Neuro: AAOx3  Psych: wnl    LABS:                        9.8    6.75  )-----------( 265      ( 04 Oct 2022 05:13 )             29.4     10-04    131<L>  |  99  |  75<H>  ----------------------------<  88  3.9   |  17<L>  |  2.12<H>    Ca    9.3      04 Oct 2022 05:13  Phos  3.9     10-03  Mg     2.1     10-03    TPro  7.5  /  Alb  3.7  /  TBili  0.4  /  DBili  x   /  AST  16  /  ALT  23  /  AlkPhos  43  10-04    PT/INR - ( 04 Oct 2022 05:14 )   PT: 13.2 sec;   INR: 1.14 ratio         PTT - ( 04 Oct 2022 05:14 )  PTT:25.7 sec        RADIOLOGY & ADDITIONAL TESTS:

## 2022-10-04 NOTE — PROGRESS NOTE ADULT - PROBLEM SELECTOR PLAN 3
Resolved; now on RA  - CT Chest (9/22): pulmonary edema   - TTE (9/22): diastolic dysfunction and EF 45-50%  - given history of MCTD, c/f ILD and PAH; was followed by Pulmonary and Cardiology at OSH

## 2022-10-04 NOTE — PROGRESS NOTE ADULT - PROBLEM SELECTOR PLAN 5
- c/w captopril 6.25mg BID  - c/w coreg 3.125 mg bid - increased captopril to 12.5mg TID  - c/w coreg 3.125 mg bid

## 2022-10-04 NOTE — PROGRESS NOTE ADULT - SUBJECTIVE AND OBJECTIVE BOX
*******************************  Jesús Fonseca MD (PGY-1)  Internal Medicine  Contact via [employer] TEAMS  [facility] Pager: [phone]  [facility] Pager: [phone]  *******************************    PROGRESS NOTE:     Patient is a 72y old  Female who presents with a chief complaint of SOB (03 Oct 2022 16:09)    SUBJECTIVE / OVERNIGHT EVENTS: Patient seen and examined at bedside. No acute overnight events. This morning, the patient is comfortable and doing well. No acute complaints. Denies fevers, chills, N/V/D, chest pain, SOB, abdominal pain.    MEDICATIONS  (STANDING):  allopurinol 100 milliGRAM(s) Oral daily  captopril 6.25 milliGRAM(s) Oral two times a day  carvedilol 3.125 milliGRAM(s) Oral every 12 hours  cephalexin 500 milliGRAM(s) Oral every 12 hours  lactobacillus acidophilus 1 Tablet(s) Oral daily  levothyroxine 75 MICROGram(s) Oral daily  nicotine -  14 mG/24Hr(s) Patch 1 Patch Transdermal daily  polyethylene glycol 3350 17 Gram(s) Oral daily  predniSONE   Tablet 10 milliGRAM(s) Oral daily  senna 2 Tablet(s) Oral at bedtime  sodium bicarbonate 650 milliGRAM(s) Oral three times a day    MEDICATIONS  (PRN):  acetaminophen     Tablet .. 650 milliGRAM(s) Oral every 6 hours PRN Temp greater or equal to 38C (100.4F), Mild Pain (1 - 3)  melatonin 3 milliGRAM(s) Oral at bedtime PRN Insomnia  nitroglycerin     SubLingual 0.4 milliGRAM(s) SubLingual every 5 minutes PRN Chest Pain    CAPILLARY BLOOD GLUCOSE    I&O's Summary    02 Oct 2022 07:01  -  03 Oct 2022 07:00  --------------------------------------------------------  IN: 240 mL / OUT: 0 mL / NET: 240 mL    03 Oct 2022 07:01  -  04 Oct 2022 06:50  --------------------------------------------------------  IN: 480 mL / OUT: 0 mL / NET: 480 mL    PHYSICAL EXAM:  Vital Signs Last 24 Hrs  T(C): 37.2 (04 Oct 2022 04:26), Max: 37.2 (03 Oct 2022 20:54)  T(F): 98.9 (04 Oct 2022 04:26), Max: 99 (03 Oct 2022 20:54)  HR: 93 (04 Oct 2022 04:26) (61 - 93)  BP: 169/79 (04 Oct 2022 04:26) (129/60 - 169/79)  BP(mean): --  RR: 18 (04 Oct 2022 04:26) (18 - 18)  SpO2: 94% (04 Oct 2022 04:26) (94% - 98%)    Parameters below as of 04 Oct 2022 04:26  Patient On (Oxygen Delivery Method): room air    GENERAL: NAD, lying in bed comfortably  HEART: S1, S2, Regular rate and rhythm, no murmurs, rubs, or gallops  LUNGS: Unlabored respirations, clear to auscultation bilaterally, no crackles, wheezing, or rhonchi  ABDOMEN: Soft, nontender, nondistended, +BS  EXTREMITIES: 2+ peripheral pulses bilaterally. No clubbing, cyanosis, or edema  NERVOUS SYSTEM:  A&Ox3, no focal deficits   SKIN: No rashes or lesions    LABS:                        9.8    6.75  )-----------( 265      ( 04 Oct 2022 05:13 )             29.4     10-04    131<L>  |  99  |  75<H>  ----------------------------<  88  3.9   |  17<L>  |  2.12<H>    Ca    9.3      04 Oct 2022 05:13  Phos  3.9     10-03  Mg     2.1     10-03    TPro  7.5  /  Alb  3.7  /  TBili  0.4  /  DBili  x   /  AST  16  /  ALT  23  /  AlkPhos  43  10-04    PT/INR - ( 04 Oct 2022 05:14 )   PT: 13.2 sec;   INR: 1.14 ratio    PTT - ( 04 Oct 2022 05:14 )  PTT:25.7 sec    RADIOLOGY & ADDITIONAL TESTS:  Results Reviewed:   Imaging Personally Reviewed:  Electrocardiogram Personally Reviewed:  Tele:    COORDINATION OF CARE:  Care Discussed with Consultants/Other Providers [Y/N]:  Prior or Outpatient Records Reviewed [Y/N]:   *******************************  Jesús Fonseca MD (PGY-1)  Internal Medicine  Contact via [employer] TEAMS  [facility] Pager: [phone]  [facility] Pager: [phone]  *******************************    PROGRESS NOTE:     Patient is a 72y old  Female who presents with a chief complaint of SOB (03 Oct 2022 16:09)    SUBJECTIVE / OVERNIGHT EVENTS: Patient seen and examined at bedside. No acute overnight events. This morning, the patient is comfortable and doing well. No acute complaints. Denies fevers, chills, N/V/D, chest pain, SOB, abdominal pain.    MEDICATIONS  (STANDING):  allopurinol 100 milliGRAM(s) Oral daily  captopril 12.5 milliGRAM(s) Oral three times a day  carvedilol 3.125 milliGRAM(s) Oral every 12 hours  cephalexin 500 milliGRAM(s) Oral every 12 hours  lactobacillus acidophilus 1 Tablet(s) Oral daily  levothyroxine 75 MICROGram(s) Oral daily  nicotine -  14 mG/24Hr(s) Patch 1 Patch Transdermal daily  polyethylene glycol 3350 17 Gram(s) Oral daily  predniSONE   Tablet 10 milliGRAM(s) Oral daily  senna 2 Tablet(s) Oral at bedtime  sodium bicarbonate 650 milliGRAM(s) Oral three times a day    MEDICATIONS  (PRN):  acetaminophen     Tablet .. 650 milliGRAM(s) Oral every 6 hours PRN Temp greater or equal to 38C (100.4F), Mild Pain (1 - 3)  melatonin 3 milliGRAM(s) Oral at bedtime PRN Insomnia  nitroglycerin     SubLingual 0.4 milliGRAM(s) SubLingual every 5 minutes PRN Chest Pain    CAPILLARY BLOOD GLUCOSE    I&O's Summary    02 Oct 2022 07:01  -  03 Oct 2022 07:00  --------------------------------------------------------  IN: 240 mL / OUT: 0 mL / NET: 240 mL    03 Oct 2022 07:01  -  04 Oct 2022 06:50  --------------------------------------------------------  IN: 480 mL / OUT: 0 mL / NET: 480 mL    PHYSICAL EXAM:  Vital Signs Last 24 Hrs  T(C): 37.2 (04 Oct 2022 04:26), Max: 37.2 (03 Oct 2022 20:54)  T(F): 98.9 (04 Oct 2022 04:26), Max: 99 (03 Oct 2022 20:54)  HR: 93 (04 Oct 2022 04:26) (61 - 93)  BP: 169/79 (04 Oct 2022 04:26) (129/60 - 169/79)  BP(mean): --  RR: 18 (04 Oct 2022 04:26) (18 - 18)  SpO2: 94% (04 Oct 2022 04:26) (94% - 98%)    Parameters below as of 04 Oct 2022 04:26  Patient On (Oxygen Delivery Method): room air    GENERAL: NAD, lying in bed comfortably  HEART: S1, S2, Regular rate and rhythm, no murmurs, rubs, or gallops  LUNGS: Unlabored respirations, clear to auscultation bilaterally, no crackles, wheezing, or rhonchi  ABDOMEN: Soft, nontender, nondistended, +BS  EXTREMITIES: 2+ peripheral pulses bilaterally. No clubbing, cyanosis, or edema  NERVOUS SYSTEM:  A&Ox3, no focal deficits   SKIN: No rashes or lesions    LABS:                        9.8    6.75  )-----------( 265      ( 04 Oct 2022 05:13 )             29.4     10-04    131<L>  |  99  |  75<H>  ----------------------------<  88  3.9   |  17<L>  |  2.12<H>    Ca    9.3      04 Oct 2022 05:13  Phos  3.9     10-03  Mg     2.1     10-03    TPro  7.5  /  Alb  3.7  /  TBili  0.4  /  DBili  x   /  AST  16  /  ALT  23  /  AlkPhos  43  10-04    PT/INR - ( 04 Oct 2022 05:14 )   PT: 13.2 sec;   INR: 1.14 ratio    PTT - ( 04 Oct 2022 05:14 )  PTT:25.7 sec    RADIOLOGY & ADDITIONAL TESTS:  Results Reviewed:   Imaging Personally Reviewed:  Electrocardiogram Personally Reviewed:  Tele:    COORDINATION OF CARE:  Care Discussed with Consultants/Other Providers [Y/N]:  Prior or Outpatient Records Reviewed [Y/N]:

## 2022-10-04 NOTE — CHART NOTE - NSCHARTNOTEFT_GEN_A_CORE
/100 in IR. Need SBP lower with goal 150 mmHg. Spoke to team, will try to change/add medications for better control.

## 2022-10-04 NOTE — PROGRESS NOTE ADULT - PROBLEM SELECTOR PLAN 8
DVT PPx: subQ heparin  Diet: NPO currently for IR renal bx  Dispo: pending clinical improvement DVT PPx: subQ heparin  Diet: regular diet; NPO @midnight for IR renal bx 10/5  Dispo: pending clinical improvement

## 2022-10-04 NOTE — PROGRESS NOTE ADULT - ASSESSMENT
72F PMHx CAD, Lupus, Hypothyroidism, MCTD with Raynaud's and digital cold sensitivity, HTN, CKD3, valvular heart disease, and recent COVID in June 2022 admitted to OSH for AHRF, now with c/f scleroderma renal crisis, transferred to [facility] for rheum eval and renal biopsy. 72F PMHx CAD, Lupus, Hypothyroidism, MCTD with Raynaud's and digital cold sensitivity, HTN, CKD3, valvular heart disease, and recent COVID in June 2022 admitted to OSH for AHRF, now with c/f scleroderma renal crisis, transferred to [facility] for rheumatology eval; currently pending IR renal biopsy.

## 2022-10-04 NOTE — PROGRESS NOTE ADULT - SUBJECTIVE AND OBJECTIVE BOX
Denies CP, SOB, no N/V, did not have renal biopsy today as was very anxious and BP was too high prior to procedure, returned to floor    Vital Signs Last 24 Hrs  T(C): 36.5 (10-03-22 @ 11:12), Max: 37.2 (10-03-22 @ 05:12)  T(F): 97.7 (10-03-22 @ 11:12), Max: 99 (10-03-22 @ 05:12)  HR: 61 (10-03-22 @ 11:12) (61 - 71)  BP: 129/60 (10-03-22 @ 11:12) (129/60 - 157/58)  RR: 18 (10-03-22 @ 11:12) (18 - 18)  SpO2: 97% (10-03-22 @ 11:12) (97% - 98%)    s1s2  b/l air entry  soft, ND  no edema LE                                                    9.8    6.75  )-----------( 265      ( 04 Oct 2022 05:13 )             29.4     04 Oct 2022 05:13    131    |  99     |  75     ----------------------------<  88     3.9     |  17     |  2.12     Ca    9.3        04 Oct 2022 05:13  Phos  3.9       03 Oct 2022 07:37  Mg     2.1       03 Oct 2022 07:37    TPro  7.5    /  Alb  3.7    /  TBili  0.4    /  DBili  x      /  AST  16     /  ALT  23     /  AlkPhos  43     04 Oct 2022 05:13    LIVER FUNCTIONS - ( 04 Oct 2022 05:13 )  Alb: 3.7 g/dL / Pro: 7.5 g/dL / ALK PHOS: 43 U/L / ALT: 23 U/L / AST: 16 U/L / GGT: x           PT/INR - ( 04 Oct 2022 05:14 )   PT: 13.2 sec;   INR: 1.14 ratio      acetaminophen     Tablet .. 650 milliGRAM(s) Oral every 6 hours PRN  allopurinol 100 milliGRAM(s) Oral daily  captopril 12.5 milliGRAM(s) Oral three times a day  carvedilol 3.125 milliGRAM(s) Oral every 12 hours  cephalexin 500 milliGRAM(s) Oral every 12 hours  lactobacillus acidophilus 1 Tablet(s) Oral daily  levothyroxine 75 MICROGram(s) Oral daily  melatonin 3 milliGRAM(s) Oral at bedtime PRN  nicotine -  14 mG/24Hr(s) Patch 1 Patch Transdermal daily  nitroglycerin     SubLingual 0.4 milliGRAM(s) SubLingual every 5 minutes PRN  polyethylene glycol 3350 17 Gram(s) Oral daily  predniSONE   Tablet 10 milliGRAM(s) Oral daily  senna 2 Tablet(s) Oral at bedtime  sodium bicarbonate 650 milliGRAM(s) Oral three times a day    Impression/Plan:    CM, mod AR, MR  Hx SLE, MCTD, concern for Scleroderma w/Raynaud  Rheum input appr  Adm w/SOB, improved  BP elevated prior to renal biopsy, biopsy rescheduled  Pt reports being very anxious about upcoming biopsy  NED in setting of CM, valvular heart disease, CTD (Cr 1.19 - 6/4/22, Cr 0.94 - 10/25/21)  Started on ACE  No overt volume overload  Cr is improving  Continue Na Bicarb   BMP daily  Avoid nephrotoxins  Plan for renal biopsy now 10/5/22 to help guide therapy   D/w medicine    [phone]

## 2022-10-04 NOTE — PROGRESS NOTE ADULT - ATTENDING COMMENTS
72F with hx of SLE, MCTD with AHRF with acute on chronic renal disease here for further evaluation of acute kidney injury. Ddx includes scleroderma renal crisis, less likely lupus nephritis. Complement levels wnl. Cr improving. Complicated with ep of hypotension when captopril 50mg daily started and restarted captopril at lower dose. Plans for IR renal biopsy today cancelled due to SBP 180s. Patient asymptomatic. Renal biopsy rescheduled for tomorrow 10/5. Will increase captopril to 12.5mg TID for better BP control. No further inpatient rheum workup or renal workup    Dispo planning in 2-3 days    d/w HS5    Deepika Alejandra MD  Division of Hospital Medicine  Available on [employer] Teams

## 2022-10-04 NOTE — PROGRESS NOTE ADULT - ATTENDING COMMENTS
changes to fellow's A/P as above  DW patient and family at bedside  kidney biopsy rescheduled given high BP, agree with increasing ACE dosage, lower prednisone  DW primary team   will follow changes to fellow's A/P as above  DW patient and family at bedside  kidney biopsy rescheduled given high BP, agree with increasing ACE dosage, lower prednisone  please increase the dose of captopril to achieve better BP control, avoid BB. can use amlodipine if additional BP control is needed despite max dose of captopril   DW primary team   will follow

## 2022-10-05 LAB
ALBUMIN SERPL ELPH-MCNC: 3.3 G/DL — SIGNIFICANT CHANGE UP (ref 3.3–5)
ALP SERPL-CCNC: 41 U/L — SIGNIFICANT CHANGE UP (ref 40–120)
ALT FLD-CCNC: 23 U/L — SIGNIFICANT CHANGE UP (ref 10–45)
ANION GAP SERPL CALC-SCNC: 14 MMOL/L — SIGNIFICANT CHANGE UP (ref 5–17)
AST SERPL-CCNC: 16 U/L — SIGNIFICANT CHANGE UP (ref 10–40)
BILIRUB SERPL-MCNC: 0.3 MG/DL — SIGNIFICANT CHANGE UP (ref 0.2–1.2)
BUN SERPL-MCNC: 63 MG/DL — HIGH (ref 7–23)
CALCIUM SERPL-MCNC: 8.9 MG/DL — SIGNIFICANT CHANGE UP (ref 8.4–10.5)
CHLORIDE SERPL-SCNC: 99 MMOL/L — SIGNIFICANT CHANGE UP (ref 96–108)
CO2 SERPL-SCNC: 18 MMOL/L — LOW (ref 22–31)
CREAT SERPL-MCNC: 1.97 MG/DL — HIGH (ref 0.5–1.3)
EGFR: 27 ML/MIN/1.73M2 — LOW
GLUCOSE SERPL-MCNC: 98 MG/DL — SIGNIFICANT CHANGE UP (ref 70–99)
HCT VFR BLD CALC: 29.7 % — LOW (ref 34.5–45)
HGB BLD-MCNC: 9.9 G/DL — LOW (ref 11.5–15.5)
MAGNESIUM SERPL-MCNC: 1.9 MG/DL — SIGNIFICANT CHANGE UP (ref 1.6–2.6)
MCHC RBC-ENTMCNC: 30.7 PG — SIGNIFICANT CHANGE UP (ref 27–34)
MCHC RBC-ENTMCNC: 33.3 GM/DL — SIGNIFICANT CHANGE UP (ref 32–36)
MCV RBC AUTO: 92 FL — SIGNIFICANT CHANGE UP (ref 80–100)
NRBC # BLD: 0 /100 WBCS — SIGNIFICANT CHANGE UP (ref 0–0)
PHOSPHATE SERPL-MCNC: 3.6 MG/DL — SIGNIFICANT CHANGE UP (ref 2.5–4.5)
PLATELET # BLD AUTO: 285 K/UL — SIGNIFICANT CHANGE UP (ref 150–400)
POTASSIUM SERPL-MCNC: 3.8 MMOL/L — SIGNIFICANT CHANGE UP (ref 3.5–5.3)
POTASSIUM SERPL-SCNC: 3.8 MMOL/L — SIGNIFICANT CHANGE UP (ref 3.5–5.3)
PROT SERPL-MCNC: 7.3 G/DL — SIGNIFICANT CHANGE UP (ref 6–8.3)
RBC # BLD: 3.23 M/UL — LOW (ref 3.8–5.2)
RBC # FLD: 14.1 % — SIGNIFICANT CHANGE UP (ref 10.3–14.5)
SARS-COV-2 RNA SPEC QL NAA+PROBE: SIGNIFICANT CHANGE UP
SODIUM SERPL-SCNC: 131 MMOL/L — LOW (ref 135–145)
WBC # BLD: 7.24 K/UL — SIGNIFICANT CHANGE UP (ref 3.8–10.5)
WBC # FLD AUTO: 7.24 K/UL — SIGNIFICANT CHANGE UP (ref 3.8–10.5)

## 2022-10-05 PROCEDURE — 99232 SBSQ HOSP IP/OBS MODERATE 35: CPT | Mod: GC

## 2022-10-05 PROCEDURE — 99233 SBSQ HOSP IP/OBS HIGH 50: CPT | Mod: GC

## 2022-10-05 RX ORDER — CAPTOPRIL 12.5 MG/1
25 TABLET ORAL THREE TIMES A DAY
Refills: 0 | Status: DISCONTINUED | OUTPATIENT
Start: 2022-10-05 | End: 2022-10-07

## 2022-10-05 RX ORDER — CARVEDILOL PHOSPHATE 80 MG/1
6.25 CAPSULE, EXTENDED RELEASE ORAL EVERY 12 HOURS
Refills: 0 | Status: DISCONTINUED | OUTPATIENT
Start: 2022-10-05 | End: 2022-10-05

## 2022-10-05 RX ORDER — CARVEDILOL PHOSPHATE 80 MG/1
12.5 CAPSULE, EXTENDED RELEASE ORAL EVERY 12 HOURS
Refills: 0 | Status: DISCONTINUED | OUTPATIENT
Start: 2022-10-05 | End: 2022-10-05

## 2022-10-05 RX ORDER — NICOTINE POLACRILEX 2 MG
1 GUM BUCCAL EVERY 4 HOURS
Refills: 0 | Status: DISCONTINUED | OUTPATIENT
Start: 2022-10-05 | End: 2022-10-07

## 2022-10-05 RX ORDER — FELODIPINE 5 MG/1
2.5 TABLET, FILM COATED, EXTENDED RELEASE ORAL DAILY
Refills: 0 | Status: DISCONTINUED | OUTPATIENT
Start: 2022-10-05 | End: 2022-10-06

## 2022-10-05 RX ORDER — LABETALOL HCL 100 MG
5 TABLET ORAL ONCE
Refills: 0 | Status: COMPLETED | OUTPATIENT
Start: 2022-10-05 | End: 2022-10-05

## 2022-10-05 RX ORDER — CARVEDILOL PHOSPHATE 80 MG/1
6.25 CAPSULE, EXTENDED RELEASE ORAL EVERY 12 HOURS
Refills: 0 | Status: DISCONTINUED | OUTPATIENT
Start: 2022-10-05 | End: 2022-10-06

## 2022-10-05 RX ADMIN — CARVEDILOL PHOSPHATE 6.25 MILLIGRAM(S): 80 CAPSULE, EXTENDED RELEASE ORAL at 18:16

## 2022-10-05 RX ADMIN — Medication 1 TABLET(S): at 13:03

## 2022-10-05 RX ADMIN — Medication 100 MILLIGRAM(S): at 13:03

## 2022-10-05 RX ADMIN — CAPTOPRIL 12.5 MILLIGRAM(S): 12.5 TABLET ORAL at 07:00

## 2022-10-05 RX ADMIN — Medication 5 MILLIGRAM(S): at 06:03

## 2022-10-05 RX ADMIN — Medication 650 MILLIGRAM(S): at 21:45

## 2022-10-05 RX ADMIN — CAPTOPRIL 25 MILLIGRAM(S): 12.5 TABLET ORAL at 13:04

## 2022-10-05 RX ADMIN — Medication 500 MILLIGRAM(S): at 18:16

## 2022-10-05 RX ADMIN — Medication 500 MILLIGRAM(S): at 06:04

## 2022-10-05 RX ADMIN — Medication 1 PATCH: at 12:00

## 2022-10-05 RX ADMIN — Medication 75 MICROGRAM(S): at 06:04

## 2022-10-05 RX ADMIN — Medication 10 MILLIGRAM(S): at 06:04

## 2022-10-05 RX ADMIN — FELODIPINE 2.5 MILLIGRAM(S): 5 TABLET, FILM COATED, EXTENDED RELEASE ORAL at 13:02

## 2022-10-05 RX ADMIN — CAPTOPRIL 25 MILLIGRAM(S): 12.5 TABLET ORAL at 21:45

## 2022-10-05 RX ADMIN — Medication 650 MILLIGRAM(S): at 13:04

## 2022-10-05 RX ADMIN — CARVEDILOL PHOSPHATE 6.25 MILLIGRAM(S): 80 CAPSULE, EXTENDED RELEASE ORAL at 06:05

## 2022-10-05 RX ADMIN — Medication 1 PATCH: at 07:00

## 2022-10-05 RX ADMIN — Medication 650 MILLIGRAM(S): at 06:03

## 2022-10-05 NOTE — PROGRESS NOTE ADULT - ASSESSMENT
73 yo F PMH RA, SLE, MCTD, Raynaud's, CAD, hypothyroidism, HTN, CKD3 (baseline creatinine 1.1 6/2022), COVID 6/2022 presented to [facility] w/ acute sob found to have pulmonary edema 2/2 hypertensive emergency w/ NED (creatinine 2.8) on CKD w/ concern for scleroderma renal crisis, s/p captopril initiation (9/26/22), transferred to [facility] for kidney biopsy. Rheumatology consulted for evaluation    #NED on CKD:   =Pt w/ unclear hx of RA, SLE, MCTD (+RNP) w/ features of Raynaud's and physical exam findings consistent w/ scleroderma (puffy hands, taut skin, telangiectasia)  =centromere and SCL-70 negative, RNA polymerase III now resulted 101   =another consideration is paraneoplastic process given elevated Kappa/lamda ratio and IgG Kappa on SPEP, weight loss   =improvement in P/C ratio and creatinine stability since captopril initiation 9/26  =Renal bx was postponed twice due to HTN    #Elevated BP  concern for SSc renal crisis   now on captopril     #Overlap syndrome (SLE/SLE/MCTD)  Serology: +VICENTE 1:1280 speckled, DS-DNA 44, RNP>8, SSA>8,, RNP-polymerase 3 highly positive (101)  Negative: SSB, centromere, SCL-70, C3/C4      Plan:  -Recommended to increase captopril 25 mg TID (max dose for captopril can be go up to 300-450 mg/day) to target better BP control and recommend to add amlodipine is not well controlled w/ongoing dose  -Avoid using BB since will cause vasospasm and worsening the BP  -Discuss the case w/her outpatient rheumatologist who started the prednisone 10 mg in 8/2022 for arthralgia, we will start tapering the dose of prednisone to avoid worsening SSc renal crisis. Recommend to taper dose 7.5 mg 3 days, and then 5 mg 3 days    -Discuss w/patient and daughter to start Mycophenolate (MMF) with the extension of sclerosis in her skin. Patient wants to start after the biopsy. Hepatitis panel and QuantiFeron were all negative.  -Provide paper for MMF  -plan for renal biopsy 10/6/22 w/ IR  -patient was advised that she should be up to date with age appropriate malignancy screening   -Consider Hem/Onc eval given elevated Kappa/Lamda ratio and IgG Kappa on SPEP    D/w Dr. Nic Lofton MD  PGY-4 Rheumatology Fellow  Pager: [phone]   Available in teams      71 yo F PMH RA, SLE, MCTD, Raynaud's, CAD, hypothyroidism, HTN, CKD3 (baseline creatinine 1.1 6/2022), COVID 6/2022 presented to [facility] w/ acute sob found to have pulmonary edema 2/2 hypertensive emergency w/ NED (creatinine 2.8) on CKD w/ concern for scleroderma renal crisis, s/p captopril initiation (9/26/22), transferred to [facility] for kidney biopsy. Rheumatology consulted for evaluation    #NED on CKD:   =Pt carries a diagnosis of  RA and  SLE.   Favor MCTD (+ VICENTE, +RNP) w/ features of Raynaud's and physical exam findings consistent w/ scleroderma (puffy hands, taut skin, telangiectasia)  =centromere and SCL-70 negative, RNA polymerase    =would also r/o a paraneoplastic process given elevated Kappa/lamda ratio and IgG Kappa on SPEP, weight loss   =improvement in P/C ratio and creatinine stability since captopril initiation 9/26  =Renal bx was postponed twice due to HTN    #Elevated BP  concern for SSc renal crisis   now on captopril         Plan:  -Recommended to increase captopril 25 mg TID  to target better BP control and recommend to add amlodipine if BP remains elevated despite max dose of Captopril   -Avoid using BB since will cause vasospasm and worsening the BP (please d/c carvedilol)  -Discussed the case w/her outpatient rheumatologist who started the prednisone 10 mg in 8/2022 for arthralgia, we will start tapering the dose of prednisone to avoid worsening SSc renal crisis. Recommend to taper dose 7.5 mg 3 days, and then 5 mg 3 days    -Discuss w/patient and daughter to start Mycophenolate (MMF)  Patient wants to start after the biopsy. Hepatitis panel and QuantiFeron were all negative.  -plan for renal biopsy 10/6/22 w/ IR  -patient was advised that she should be up to date with age appropriate malignancy screening   -Consider Hem/Onc eval given elevated Kappa/Lamda ratio and IgG Kappa on SPEP    D/w Dr. Nic Lofton MD  PGY-4 Rheumatology Fellow  Pager: [phone]   Available in teams

## 2022-10-05 NOTE — CHART NOTE - NSCHARTNOTEFT_GEN_A_CORE
Rheumatology    Patient w/elevate BP and renal bx was postponed twice.     Recommend to increase Captopril 25 mg TID. Please avoid using BB, if the BP remains high can use CCB.   Talked with Dr. Nolasco regarding the prednisone. She was started in 8/2022 and it was for arthralgias.   Recommend to start tapering prednisone to 7.5 mg for 3 days and then 5 mg for 3 days.     D/W Dr. Nic Lofton MD  PGY-4 Rheumatology Fellow  Pager: [phone]   Available in teams Rheumatology    Patient w/elevate BP and renal bx was postponed twice.     Recommend to increase Captopril 25 mg TID. Please avoid using BB, if the BP remains high can use amlodipine.   Talked with Dr. Nolasco regarding the prednisone. She was started in 8/2022 and it was for arthralgias.   Recommend to start tapering prednisone to 7.5 mg for 3 days and then 5 mg for 3 days.     D/W Dr. Nic Lofton MD  PGY-4 Rheumatology Fellow  Pager: [phone]   Available in teams

## 2022-10-05 NOTE — PROGRESS NOTE ADULT - SUBJECTIVE AND OBJECTIVE BOX
[facility] NEPHROLOGY SERVICES, [facility]  NEPHROLOGY AND HYPERTENSION  [address]  [address]  [place] [address]  [phone]    MD GERALDINE WINSTON MD ANDREY GONCHARUK, MD MADHU KORRAPATI, MD YELENA ROSENBERG, MD BINNY KOSHY, MD CHRISTOPHER CAPUTO, MD TANNA YO MD          Patient events noted  No distress      MEDICATIONS  (STANDING):  allopurinol 100 milliGRAM(s) Oral daily  captopril 25 milliGRAM(s) Oral three times a day  carvedilol 6.25 milliGRAM(s) Oral every 12 hours  cephalexin 500 milliGRAM(s) Oral every 12 hours  felodipine ER 2.5 milliGRAM(s) Oral daily  lactobacillus acidophilus 1 Tablet(s) Oral daily  levothyroxine 75 MICROGram(s) Oral daily  LORazepam     Tablet 0.5 milliGRAM(s) Oral once  polyethylene glycol 3350 17 Gram(s) Oral daily  senna 2 Tablet(s) Oral at bedtime  sodium bicarbonate 650 milliGRAM(s) Oral three times a day    MEDICATIONS  (PRN):  acetaminophen     Tablet .. 650 milliGRAM(s) Oral every 6 hours PRN Temp greater or equal to 38C (100.4F), Mild Pain (1 - 3)  melatonin 3 milliGRAM(s) Oral at bedtime PRN Insomnia  nicotine - Inhaler 1 Each Inhalation every 4 hours PRN nicotine dependence  nitroglycerin     SubLingual 0.4 milliGRAM(s) SubLingual every 5 minutes PRN Chest Pain      10-04-22 @ 07:01  -  10-05-22 @ 07:00  --------------------------------------------------------  IN: 580 mL / OUT: 0 mL / NET: 580 mL    10-05-22 @ 07:01  -  10-05-22 @ 15:10  --------------------------------------------------------  IN: 240 mL / OUT: 0 mL / NET: 240 mL      PHYSICAL EXAM:      T(C): 36.3 (10-05-22 @ 11:33), Max: 37.3 (10-04-22 @ 20:10)  HR: 60 (10-05-22 @ 11:33) (60 - 73)  BP: 144/77 (10-05-22 @ 11:33) (144/77 - 180/77)  RR: 18 (10-05-22 @ 11:33) (18 - 18)  SpO2: 97% (10-05-22 @ 11:33) (97% - 98%)  Wt(kg): --  Lungs clear  Heart S1S2  Abd soft NT ND  Extremities:   tr edema                                    9.9    7.24  )-----------( 285      ( 05 Oct 2022 04:29 )             29.7     10-05    131<L>  |  99  |  63<H>  ----------------------------<  98  3.8   |  18<L>  |  1.97<H>    Ca    8.9      05 Oct 2022 04:29  Phos  3.6     10-05  Mg     1.9     10-05    TPro  7.3  /  Alb  3.3  /  TBili  0.3  /  DBili  x   /  AST  16  /  ALT  23  /  AlkPhos  41  10-05      LIVER FUNCTIONS - ( 05 Oct 2022 04:29 )  Alb: 3.3 g/dL / Pro: 7.3 g/dL / ALK PHOS: 41 U/L / ALT: 23 U/L / AST: 16 U/L / GGT: x           Creatinine Trend: 1.97<--, 2.12<--, 2.45<--, 2.54<--, 2.63<--, 2.67<--        Impression/Plan:    CM, mod AR, MR  Hx SLE, MCTD, concern for Scleroderma w/Raynaud  Rheum input appr  Adm w/SOB, improved  BP elevated prior to renal biopsy, biopsy rescheduled  Pt reports being very anxious about upcoming biopsy  NED in setting of CM, valvular heart disease, CTD (Cr 1.19 - 6/4/22, Cr 0.94 - 10/25/21)  Started on ACE  No overt volume overload  Cr is improving  Continue Na Bicarb   BMP daily  Avoid nephrotoxins  Plan for renal biopsy today to help guide therapy   D/w medicine    Rene Collins MD

## 2022-10-05 NOTE — PROGRESS NOTE ADULT - PROBLEM SELECTOR PLAN 7
DVT PPx: subQ heparin; holding currently for IR renal bx 10/5  Diet: NPO for IR renal bx 10/5  Dispo: home day after renal bx DVT PPx: subQ heparin; holding currently for IR renal bx 10/6  Diet: NPO @MN for IR renal bx 10/6  Dispo: home day after renal bx

## 2022-10-05 NOTE — CHART NOTE - NSCHARTNOTEFT_GEN_A_CORE
IR Follow Up    patient was tentatively scheduled to undergo non target renal parenchymal biopsy yesterday and today, however yesterday's case was cancelled due to hypertension. patient noted to be continually hypertensive overnight. given increased bleeding risk associated with hypertension, case is again cancelled for today.    -please correct hypertension to SBP<150 for 24h  -can tentatively reschedule for tomorrow, 10/6, if hypertension is better controlled  -NPO at midnight  -repeat AM labs  -COVID PCR within 5 days of procedure  -discussed with primary team    Serg Villalobos MD  PGY-V, Interventional Radiology    -Available on [employer] TEAMS for all non-urgent questions  -Emergent issues: [facility]-p.[phone]; [facility]-p.[phone] ([phone])  -Non-emergent consults: Please place a [place] order "Consult-Interventional Radiology" with an appropriate callback number  -Scheduling questions: [facility]: [phone]; [facility]: [phone]  -Clinic/Outpatient booking: [facility]: [phone]; [facility]: [phone]

## 2022-10-05 NOTE — PROGRESS NOTE ADULT - ATTENDING COMMENTS
changes to fellow's A/P as above  please titrate up the dose of captopril every 8 hours to achieve normotension. if BP remains elevated, can add CCB. please d/c coreg   reduce prednisone to 7.5 mg daily   DW patient and daughter starting MMF  kidney biopsy rescheduled   DW primary team

## 2022-10-05 NOTE — PROGRESS NOTE ADULT - SUBJECTIVE AND OBJECTIVE BOX
SIMBA CHEATHAM  [id number]    INTERVAL HPI/OVERNIGHT EVENTS:  Patient was seen at the bedside. The renal bx was postponed twice due to her HTN. Notice extension of sclerosis of the skin in the left forearm.       PMHx/PSHx/FamHx/SocHx reviewed and no significant changes    REVIEW OF SYSTEMS   - reviewed with patient and  negative other than as above or previously documented.     MEDICATIONS  (STANDING):  allopurinol 100 milliGRAM(s) Oral daily  captopril 25 milliGRAM(s) Oral three times a day  carvedilol 6.25 milliGRAM(s) Oral every 12 hours  cephalexin 500 milliGRAM(s) Oral every 12 hours  felodipine ER 2.5 milliGRAM(s) Oral daily  lactobacillus acidophilus 1 Tablet(s) Oral daily  levothyroxine 75 MICROGram(s) Oral daily  LORazepam     Tablet 0.5 milliGRAM(s) Oral once  polyethylene glycol 3350 17 Gram(s) Oral daily  senna 2 Tablet(s) Oral at bedtime  sodium bicarbonate 650 milliGRAM(s) Oral three times a day    MEDICATIONS  (PRN):  acetaminophen     Tablet .. 650 milliGRAM(s) Oral every 6 hours PRN Temp greater or equal to 38C (100.4F), Mild Pain (1 - 3)  melatonin 3 milliGRAM(s) Oral at bedtime PRN Insomnia  nicotine - Inhaler 1 Each Inhalation every 4 hours PRN nicotine dependence  nitroglycerin     SubLingual 0.4 milliGRAM(s) SubLingual every 5 minutes PRN Chest Pain      Allergies    hydrALAZINE (Angioedema; Rash)    Intolerances          Vital Signs Last 24 Hrs  T(C): 36.7 (05 Oct 2022 16:30), Max: 37.3 (04 Oct 2022 20:10)  T(F): 98 (05 Oct 2022 16:30), Max: 99.2 (04 Oct 2022 20:10)  HR: 72 (05 Oct 2022 16:30) (60 - 73)  BP: 149/73 (05 Oct 2022 16:30) (144/77 - 168/79)  BP(mean): --  RR: 17 (05 Oct 2022 16:30) (17 - 18)  SpO2: 97% (05 Oct 2022 16:30) (97% - 98%)    Parameters below as of 05 Oct 2022 16:30  Patient On (Oxygen Delivery Method): room air        Physical Exam:  General: NAD  HEENT: EOMI, MMM  Cardio: +S1/S2, RRR  Resp: CTA b/l  GI: +BS, soft, NT/ND  MSK: puffy b/l hands, left forearm thickening concerning of sclerosis. No sign of synovitis and good ROM.   Skin: telangiectasia face   Neuro: AAOx3  Psych: wnl      LABS:                        9.9    7.24  )-----------( 285      ( 05 Oct 2022 04:29 )             29.7     10-05    131<L>  |  99  |  63<H>  ----------------------------<  98  3.8   |  18<L>  |  1.97<H>    Ca    8.9      05 Oct 2022 04:29  Phos  3.6     10-05  Mg     1.9     10-05    TPro  7.3  /  Alb  3.3  /  TBili  0.3  /  DBili  x   /  AST  16  /  ALT  23  /  AlkPhos  41  10-05    PT/INR - ( 04 Oct 2022 05:14 )   PT: 13.2 sec;   INR: 1.14 ratio         PTT - ( 04 Oct 2022 05:14 )  PTT:25.7 sec        RADIOLOGY & ADDITIONAL TESTS:       SIMBA CHEATHAM  [id number]    INTERVAL HPI/OVERNIGHT EVENTS:  Patient was seen at the bedside. The renal bx was postponed twice due to her HTN.      PMHx/PSHx/FamHx/SocHx reviewed and no significant changes    REVIEW OF SYSTEMS   - reviewed with patient and  negative other than as above or previously documented.     MEDICATIONS  (STANDING):  allopurinol 100 milliGRAM(s) Oral daily  captopril 12.5 milliGRAM(s) Oral three times a day  carvedilol 6.25 milliGRAM(s) Oral every 12 hours  cephalexin 500 milliGRAM(s) Oral every 12 hours  felodipine ER 2.5 milliGRAM(s) Oral daily  lactobacillus acidophilus 1 Tablet(s) Oral daily  levothyroxine 75 MICROGram(s) Oral daily  LORazepam     Tablet 0.5 milliGRAM(s) Oral once  polyethylene glycol 3350 17 Gram(s) Oral daily  senna 2 Tablet(s) Oral at bedtime  sodium bicarbonate 650 milliGRAM(s) Oral three times a day    MEDICATIONS  (PRN):  acetaminophen     Tablet .. 650 milliGRAM(s) Oral every 6 hours PRN Temp greater or equal to 38C (100.4F), Mild Pain (1 - 3)  melatonin 3 milliGRAM(s) Oral at bedtime PRN Insomnia  nicotine - Inhaler 1 Each Inhalation every 4 hours PRN nicotine dependence  nitroglycerin     SubLingual 0.4 milliGRAM(s) SubLingual every 5 minutes PRN Chest Pain      Allergies    hydrALAZINE (Angioedema; Rash)    Intolerances          Vital Signs Last 24 Hrs  T(C): 36.7 (05 Oct 2022 16:30), Max: 37.3 (04 Oct 2022 20:10)  T(F): 98 (05 Oct 2022 16:30), Max: 99.2 (04 Oct 2022 20:10)  HR: 72 (05 Oct 2022 16:30) (60 - 73)  BP: 149/73 (05 Oct 2022 16:30) (144/77 - 168/79)  BP(mean): --  RR: 17 (05 Oct 2022 16:30) (17 - 18)  SpO2: 97% (05 Oct 2022 16:30) (97% - 98%)    Parameters below as of 05 Oct 2022 16:30  Patient On (Oxygen Delivery Method): room air        Physical Exam:  General: NAD  HEENT: EOMI, MMM  Cardio: +S1/S2, RRR  Resp: CTA b/l  GI: +BS, soft, NT/ND  MSK: puffy b/l hands, left forearm thickening. No sign of synovitis and good ROM.   Skin: telangiectasia face   Neuro: AAOx3  Psych: wnl      LABS:                        9.9    7.24  )-----------( 285      ( 05 Oct 2022 04:29 )             29.7     10-05    131<L>  |  99  |  63<H>  ----------------------------<  98  3.8   |  18<L>  |  1.97<H>    Ca    8.9      05 Oct 2022 04:29  Phos  3.6     10-05  Mg     1.9     10-05    TPro  7.3  /  Alb  3.3  /  TBili  0.3  /  DBili  x   /  AST  16  /  ALT  23  /  AlkPhos  41  10-05    PT/INR - ( 04 Oct 2022 05:14 )   PT: 13.2 sec;   INR: 1.14 ratio         PTT - ( 04 Oct 2022 05:14 )  PTT:25.7 sec        RADIOLOGY & ADDITIONAL TESTS:

## 2022-10-05 NOTE — PROGRESS NOTE ADULT - PROBLEM SELECTOR PLAN 4
Confirmed with her rheumatologist that she has features of scleroderma and SLE  - rheum following; appreciate recs   - labs and exam concerning for possible scleroderma renal crisis   - decreased prednisone to 7.5 tmg daily

## 2022-10-05 NOTE — PROGRESS NOTE ADULT - ATTENDING COMMENTS
72F with hx of SLE, MCTD with AHRF with acute on chronic renal disease here for further evaluation of acute kidney injury. Ddx includes scleroderma renal crisis, less likely lupus nephritis. Complement levels wnl. Cr improving. Complicated with ep of hypotension at OSH when captopril started. On this admission, patient persistently hypertensive despite increasing captopril, asymptomatic. Renal biopsy cancelled twice due to uncontrolled HTN.     #Hypertension  -increase captopril 25mg TID. Add feldopine 2.5mg (quicker acting than amlodpine)  -transition from nictone patch to inhaler  -BP check q4hrs    #NED   -renal biopsy by IR 10/6 if BP controlled SBP <150 for 24 hours  -monitor cr    Dispo planning in 2-3 days    d/w HS5. Updated patient and  at bedside    Deepika Alejandra MD  Division of Hospital Medicine  Available on [employer] Teams .

## 2022-10-05 NOTE — PROGRESS NOTE ADULT - PROBLEM SELECTOR PLAN 6
History of PCI   - c/w captopril 12.5mg TID  - c/w coreg 3.125 mg bid History of PCI   - increased captopril to 25mg TID  - started felodopine 2.5mg qd  - increased coreg to 12.5mg bid

## 2022-10-05 NOTE — PROGRESS NOTE ADULT - PROBLEM SELECTOR PLAN 2
SBPs 110s-180s throughout this admission  - c/w captopril 12.5mg TID  - c/w coreg 3.125 mg bid SBPs 110s-180s throughout this admission  - increased captopril to 25mg TID  - started felodopine 2.5mg qd  - increased coreg to 12.5mg bid

## 2022-10-05 NOTE — PROGRESS NOTE ADULT - PROBLEM SELECTOR PLAN 1
SCr 2.80 on admission (baseline 0.94 10/2021 and 1.19 6/2022); DDx includes b/l renal artery stenosis vs scleroderma renal crisis vs. lupus nephritis  - no overt volume overload noted; BP presently stable  - nephrology following, appreciate recs: per nephrology, NED may be in setting of CM, valvular heart disease vs. CTD  - rheum following, appreciate recs- no further workup indicated at this time  - US duplex kidneys (9/30) w/o SHILA  - scheduled for IR renal bx 10/5; per nephrology, can d/c the next day after bx  - c/w captopril 12.5mg TID  - monitor BMP daily; avoid nephrotoxins SCr 2.80 on admission (baseline 0.94 10/2021 and 1.19 6/2022); DDx includes b/l renal artery stenosis vs scleroderma renal crisis vs. lupus nephritis  - no overt volume overload noted; BP presently stable  - nephrology following, appreciate recs: per nephrology, NED may be in setting of CM, valvular heart disease vs. CTD  - rheum following, appreciate recs- no further workup indicated at this time  - US duplex kidneys (9/30) w/o SHILA  - IR renal bx postponed again 10/5 due to elevated BPs; tentatively planned for 10/6 if BPs <150 for 24h; per nephrology, can d/c the next day after bx  - increased captopril to 25mg TID  - started felodopine 2.5mg qd  - monitor BMP daily; avoid nephrotoxins

## 2022-10-05 NOTE — PROGRESS NOTE ADULT - SUBJECTIVE AND OBJECTIVE BOX
*******************************  Jesús Fonseca MD (PGY-1)  Internal Medicine  Contact via [employer] TEAMS  [facility] Pager: [phone]  [facility] Pager: [phone]  *******************************    PROGRESS NOTE:     Patient is a 72y old  Female who presents with a chief complaint of SOB (04 Oct 2022 16:37)    SUBJECTIVE / OVERNIGHT EVENTS: Patient seen and examined at bedside. SBPs noted to be 150s-180s overnight; pt. received IV labetalol 5mg early this morning. This morning, the patient is comfortable and doing well. No acute complaints. Denies fevers, chills, N/V/D, chest pain, SOB, abdominal pain.    MEDICATIONS  (STANDING):  allopurinol 100 milliGRAM(s) Oral daily  captopril 12.5 milliGRAM(s) Oral three times a day  carvedilol 6.25 milliGRAM(s) Oral every 12 hours  cephalexin 500 milliGRAM(s) Oral every 12 hours  lactobacillus acidophilus 1 Tablet(s) Oral daily  levothyroxine 75 MICROGram(s) Oral daily  LORazepam     Tablet 0.5 milliGRAM(s) Oral once  nicotine -  14 mG/24Hr(s) Patch 1 Patch Transdermal daily  polyethylene glycol 3350 17 Gram(s) Oral daily  predniSONE   Tablet 10 milliGRAM(s) Oral daily  senna 2 Tablet(s) Oral at bedtime  sodium bicarbonate 650 milliGRAM(s) Oral three times a day    MEDICATIONS  (PRN):  acetaminophen     Tablet .. 650 milliGRAM(s) Oral every 6 hours PRN Temp greater or equal to 38C (100.4F), Mild Pain (1 - 3)  melatonin 3 milliGRAM(s) Oral at bedtime PRN Insomnia  nitroglycerin     SubLingual 0.4 milliGRAM(s) SubLingual every 5 minutes PRN Chest Pain    CAPILLARY BLOOD GLUCOSE    I&O's Summary    04 Oct 2022 07:01  -  05 Oct 2022 07:00  --------------------------------------------------------  IN: 480 mL / OUT: 0 mL / NET: 480 mL    PHYSICAL EXAM:  Vital Signs Last 24 Hrs  T(C): 36.6 (05 Oct 2022 04:09), Max: 37.3 (04 Oct 2022 20:10)  T(F): 97.9 (05 Oct 2022 04:09), Max: 99.2 (04 Oct 2022 20:10)  HR: 68 (05 Oct 2022 04:09) (64 - 78)  BP: 159/75 (05 Oct 2022 04:09) (116/67 - 180/77)  BP(mean): --  RR: 18 (05 Oct 2022 04:09) (18 - 18)  SpO2: 97% (05 Oct 2022 04:09) (97% - 98%)    Parameters below as of 05 Oct 2022 04:09  Patient On (Oxygen Delivery Method): room air    GENERAL: NAD, lying in bed comfortably  HEART: S1, S2, Regular rate and rhythm, no murmurs, rubs, or gallops  LUNGS: Unlabored respirations, clear to auscultation bilaterally, no crackles, wheezing, or rhonchi  ABDOMEN: Soft, nontender, nondistended, +BS  EXTREMITIES: 2+ peripheral pulses bilaterally. No clubbing, cyanosis, or edema  NERVOUS SYSTEM:  A&Ox3, no focal deficits   SKIN: No rashes or lesions    LABS:                        9.9    7.24  )-----------( 285      ( 05 Oct 2022 04:29 )             29.7     10-05    131<L>  |  99  |  63<H>  ----------------------------<  98  3.8   |  18<L>  |  1.97<H>    Ca    8.9      05 Oct 2022 04:29  Phos  3.6     10-05  Mg     1.9     10-05    TPro  7.3  /  Alb  3.3  /  TBili  0.3  /  DBili  x   /  AST  16  /  ALT  23  /  AlkPhos  41  10-05    PT/INR - ( 04 Oct 2022 05:14 )   PT: 13.2 sec;   INR: 1.14 ratio    PTT - ( 04 Oct 2022 05:14 )  PTT:25.7 sec    RADIOLOGY & ADDITIONAL TESTS:  Results Reviewed:   Imaging Personally Reviewed:  Electrocardiogram Personally Reviewed:  Tele:    COORDINATION OF CARE:  Care Discussed with Consultants/Other Providers [Y/N]:  Prior or Outpatient Records Reviewed [Y/N]:   *******************************  Jesús Fonseca MD (PGY-1)  Internal Medicine  Contact via [employer] TEAMS  [facility] Pager: [phone]  [facility] Pager: [phone]  *******************************    PROGRESS NOTE:     Patient is a 72y old  Female who presents with a chief complaint of SOB (04 Oct 2022 16:37)    SUBJECTIVE / OVERNIGHT EVENTS: Patient seen and examined at bedside. SBPs noted to be 150s-180s overnight; pt. received IV labetalol 5mg early this morning. This morning, the patient is comfortable and doing well. No acute complaints. Denies fevers, chills, N/V/D, chest pain, SOB, abdominal pain.    MEDICATIONS  (STANDING):  allopurinol 100 milliGRAM(s) Oral daily  captopril 25 milliGRAM(s) Oral three times a day  carvedilol 12.5 milliGRAM(s) Oral every 12 hours  cephalexin 500 milliGRAM(s) Oral every 12 hours  felodipine ER 2.5 milliGRAM(s) Oral daily  lactobacillus acidophilus 1 Tablet(s) Oral daily  levothyroxine 75 MICROGram(s) Oral daily  LORazepam     Tablet 0.5 milliGRAM(s) Oral once  polyethylene glycol 3350 17 Gram(s) Oral daily  senna 2 Tablet(s) Oral at bedtime  sodium bicarbonate 650 milliGRAM(s) Oral three times a day    MEDICATIONS  (PRN):  acetaminophen     Tablet .. 650 milliGRAM(s) Oral every 6 hours PRN Temp greater or equal to 38C (100.4F), Mild Pain (1 - 3)  melatonin 3 milliGRAM(s) Oral at bedtime PRN Insomnia  nicotine - Inhaler 1 Each Inhalation every 4 hours PRN nicotine dependence  nitroglycerin     SubLingual 0.4 milliGRAM(s) SubLingual every 5 minutes PRN Chest Pain    CAPILLARY BLOOD GLUCOSE    I&O's Summary    04 Oct 2022 07:01  -  05 Oct 2022 07:00  --------------------------------------------------------  IN: 480 mL / OUT: 0 mL / NET: 480 mL    PHYSICAL EXAM:  Vital Signs Last 24 Hrs  T(C): 36.6 (05 Oct 2022 04:09), Max: 37.3 (04 Oct 2022 20:10)  T(F): 97.9 (05 Oct 2022 04:09), Max: 99.2 (04 Oct 2022 20:10)  HR: 68 (05 Oct 2022 04:09) (64 - 78)  BP: 159/75 (05 Oct 2022 04:09) (116/67 - 180/77)  BP(mean): --  RR: 18 (05 Oct 2022 04:09) (18 - 18)  SpO2: 97% (05 Oct 2022 04:09) (97% - 98%)    Parameters below as of 05 Oct 2022 04:09  Patient On (Oxygen Delivery Method): room air    GENERAL: NAD, lying in bed comfortably  HEART: S1, S2, Regular rate and rhythm, no murmurs, rubs, or gallops  LUNGS: Unlabored respirations, clear to auscultation bilaterally, no crackles, wheezing, or rhonchi  ABDOMEN: Soft, nontender, nondistended, +BS  EXTREMITIES: 2+ peripheral pulses bilaterally. No clubbing, cyanosis, or edema  NERVOUS SYSTEM:  A&Ox3, no focal deficits   SKIN: No rashes or lesions    LABS:                        9.9    7.24  )-----------( 285      ( 05 Oct 2022 04:29 )             29.7     10-05    131<L>  |  99  |  63<H>  ----------------------------<  98  3.8   |  18<L>  |  1.97<H>    Ca    8.9      05 Oct 2022 04:29  Phos  3.6     10-05  Mg     1.9     10-05    TPro  7.3  /  Alb  3.3  /  TBili  0.3  /  DBili  x   /  AST  16  /  ALT  23  /  AlkPhos  41  10-05    PT/INR - ( 04 Oct 2022 05:14 )   PT: 13.2 sec;   INR: 1.14 ratio    PTT - ( 04 Oct 2022 05:14 )  PTT:25.7 sec    RADIOLOGY & ADDITIONAL TESTS:  Results Reviewed:   Imaging Personally Reviewed:  Electrocardiogram Personally Reviewed:  Tele:    COORDINATION OF CARE:  Care Discussed with Consultants/Other Providers [Y/N]:  Prior or Outpatient Records Reviewed [Y/N]:

## 2022-10-05 NOTE — PROGRESS NOTE ADULT - ASSESSMENT
72F PMHx CAD, Lupus, Hypothyroidism, MCTD with Raynaud's and digital cold sensitivity, HTN, CKD3, valvular heart disease, and recent COVID in June 2022 admitted to OSH for AHRF, now with c/f scleroderma renal crisis, transferred to [facility] for rheumatology eval; currently pending IR renal biopsy.

## 2022-10-06 LAB
% ALBUMIN: 52.8 % — SIGNIFICANT CHANGE UP
% ALPHA 1: 4 % — SIGNIFICANT CHANGE UP
% ALPHA 2: 10 % — SIGNIFICANT CHANGE UP
% BETA: 10.3 % — SIGNIFICANT CHANGE UP
% GAMMA, URINE: 12.4 % — SIGNIFICANT CHANGE UP
% GAMMA: 22.9 % — SIGNIFICANT CHANGE UP
ALBUMIN 24H MFR UR ELPH: 29.6 % — SIGNIFICANT CHANGE UP
ALBUMIN SERPL ELPH-MCNC: 3.5 G/DL — SIGNIFICANT CHANGE UP (ref 3.3–5)
ALBUMIN SERPL ELPH-MCNC: 4.2 G/DL — SIGNIFICANT CHANGE UP (ref 3.6–5.5)
ALBUMIN/GLOB SERPL ELPH: 1.1 RATIO — SIGNIFICANT CHANGE UP
ALP SERPL-CCNC: 44 U/L — SIGNIFICANT CHANGE UP (ref 40–120)
ALPHA1 GLOB 24H MFR UR ELPH: 28.8 % — SIGNIFICANT CHANGE UP
ALPHA1 GLOB SERPL ELPH-MCNC: 0.3 G/DL — SIGNIFICANT CHANGE UP (ref 0.1–0.4)
ALPHA2 GLOB 24H MFR UR ELPH: 13.1 % — SIGNIFICANT CHANGE UP
ALPHA2 GLOB SERPL ELPH-MCNC: 0.8 G/DL — SIGNIFICANT CHANGE UP (ref 0.5–1)
ALT FLD-CCNC: 21 U/L — SIGNIFICANT CHANGE UP (ref 10–45)
ANION GAP SERPL CALC-SCNC: 13 MMOL/L — SIGNIFICANT CHANGE UP (ref 5–17)
APTT BLD: 26.6 SEC — LOW (ref 27.5–35.5)
AST SERPL-CCNC: 15 U/L — SIGNIFICANT CHANGE UP (ref 10–40)
AUTO DIFF PNL BLD: ABNORMAL
B-GLOBULIN 24H MFR UR ELPH: 16.1 % — SIGNIFICANT CHANGE UP
B-GLOBULIN SERPL ELPH-MCNC: 0.8 G/DL — SIGNIFICANT CHANGE UP (ref 0.5–1)
BILIRUB SERPL-MCNC: 0.3 MG/DL — SIGNIFICANT CHANGE UP (ref 0.2–1.2)
BLD GP AB SCN SERPL QL: NEGATIVE — SIGNIFICANT CHANGE UP
BUN SERPL-MCNC: 61 MG/DL — HIGH (ref 7–23)
C-ANCA SER-ACNC: NEGATIVE — SIGNIFICANT CHANGE UP
CALCIUM SERPL-MCNC: 8.9 MG/DL — SIGNIFICANT CHANGE UP (ref 8.4–10.5)
CHLORIDE SERPL-SCNC: 96 MMOL/L — SIGNIFICANT CHANGE UP (ref 96–108)
CO2 SERPL-SCNC: 19 MMOL/L — LOW (ref 22–31)
COLLECT DURATION TIME UR: 24 HR — SIGNIFICANT CHANGE UP
CREAT SERPL-MCNC: 2.04 MG/DL — HIGH (ref 0.5–1.3)
DEPRECATED KAPPA LC FREE/LAMBDA SER: 5.36 RATIO — SIGNIFICANT CHANGE UP (ref 0.7–6.02)
EGFR: 25 ML/MIN/1.73M2 — LOW
GAMMA GLOBULIN: 1.8 G/DL — HIGH (ref 0.6–1.6)
GLUCOSE SERPL-MCNC: 93 MG/DL — SIGNIFICANT CHANGE UP (ref 70–99)
HCT VFR BLD CALC: 28.8 % — LOW (ref 34.5–45)
HGB BLD-MCNC: 9.7 G/DL — LOW (ref 11.5–15.5)
IGA FLD-MCNC: 268 MG/DL — SIGNIFICANT CHANGE UP (ref 84–499)
IGG FLD-MCNC: 1637 MG/DL — SIGNIFICANT CHANGE UP (ref 610–1660)
IGM SERPL-MCNC: 24 MG/DL — LOW (ref 35–242)
INR BLD: 1.1 RATIO — SIGNIFICANT CHANGE UP (ref 0.88–1.16)
INTERPRETATION 24H UR IFE-IMP: SIGNIFICANT CHANGE UP
INTERPRETATION 24H UR IFE-IMP: SIGNIFICANT CHANGE UP
INTERPRETATION SERPL IFE-IMP: SIGNIFICANT CHANGE UP
KAPPA LC SER QL IFE: 13.09 MG/DL — HIGH (ref 0.33–1.94)
KAPPA LC UR-MCNC: 438.83 MG/L — HIGH
KAPPA/LAMBDA FREE LIGHT CHAIN RATIO, SERUM: 2 RATIO — HIGH (ref 0.26–1.65)
LAMBDA LC SER QL IFE: 6.56 MG/DL — HIGH (ref 0.57–2.63)
LAMBDA LC UR-MCNC: 81.94 MG/L — HIGH
M PROTEIN 24H UR ELPH-MRATE: 0 MG/24HR — SIGNIFICANT CHANGE UP (ref 0–0)
M PROTEIN 24H UR ELPH-MRATE: 0 MG/DL — SIGNIFICANT CHANGE UP
MAGNESIUM SERPL-MCNC: 1.8 MG/DL — SIGNIFICANT CHANGE UP (ref 1.6–2.6)
MCHC RBC-ENTMCNC: 31.2 PG — SIGNIFICANT CHANGE UP (ref 27–34)
MCHC RBC-ENTMCNC: 33.7 GM/DL — SIGNIFICANT CHANGE UP (ref 32–36)
MCV RBC AUTO: 92.6 FL — SIGNIFICANT CHANGE UP (ref 80–100)
NRBC # BLD: 0 /100 WBCS — SIGNIFICANT CHANGE UP (ref 0–0)
P-ANCA SER-ACNC: NEGATIVE — SIGNIFICANT CHANGE UP
PHOSPHATE SERPL-MCNC: 3.8 MG/DL — SIGNIFICANT CHANGE UP (ref 2.5–4.5)
PLATELET # BLD AUTO: 295 K/UL — SIGNIFICANT CHANGE UP (ref 150–400)
POTASSIUM SERPL-MCNC: 3.9 MMOL/L — SIGNIFICANT CHANGE UP (ref 3.5–5.3)
POTASSIUM SERPL-SCNC: 3.9 MMOL/L — SIGNIFICANT CHANGE UP (ref 3.5–5.3)
PROT ?TM UR-MCNC: 25 MG/DL — HIGH (ref 0–12)
PROT PATTERN 24H UR ELPH-IMP: SIGNIFICANT CHANGE UP
PROT PATTERN SERPL ELPH-IMP: SIGNIFICANT CHANGE UP
PROT SERPL-MCNC: 7.2 G/DL — SIGNIFICANT CHANGE UP (ref 6–8.3)
PROT SERPL-MCNC: 8 G/DL — SIGNIFICANT CHANGE UP (ref 6–8.3)
PROTEIN QUANT CALC, URINE: 275 MG/24 H — HIGH (ref 50–100)
PROTHROM AB SERPL-ACNC: 12.8 SEC — SIGNIFICANT CHANGE UP (ref 10.5–13.4)
RBC # BLD: 3.11 M/UL — LOW (ref 3.8–5.2)
RBC # FLD: 14.1 % — SIGNIFICANT CHANGE UP (ref 10.3–14.5)
RH IG SCN BLD-IMP: POSITIVE — SIGNIFICANT CHANGE UP
SODIUM SERPL-SCNC: 128 MMOL/L — LOW (ref 135–145)
TOTAL VOLUME - 24 HOUR: 1100 ML — SIGNIFICANT CHANGE UP
URINE CREATININE CALCULATION: 0.7 G/24 H — LOW (ref 0.8–1.8)
WBC # BLD: 8.29 K/UL — SIGNIFICANT CHANGE UP (ref 3.8–10.5)
WBC # FLD AUTO: 8.29 K/UL — SIGNIFICANT CHANGE UP (ref 3.8–10.5)

## 2022-10-06 PROCEDURE — 99233 SBSQ HOSP IP/OBS HIGH 50: CPT | Mod: GC

## 2022-10-06 PROCEDURE — 99223 1ST HOSP IP/OBS HIGH 75: CPT

## 2022-10-06 RX ORDER — FELODIPINE 5 MG/1
5 TABLET, FILM COATED, EXTENDED RELEASE ORAL DAILY
Refills: 0 | Status: DISCONTINUED | OUTPATIENT
Start: 2022-10-07 | End: 2022-10-07

## 2022-10-06 RX ORDER — FELODIPINE 5 MG/1
2.5 TABLET, FILM COATED, EXTENDED RELEASE ORAL ONCE
Refills: 0 | Status: COMPLETED | OUTPATIENT
Start: 2022-10-06 | End: 2022-10-06

## 2022-10-06 RX ADMIN — Medication 650 MILLIGRAM(S): at 21:54

## 2022-10-06 RX ADMIN — Medication 7.5 MILLIGRAM(S): at 05:26

## 2022-10-06 RX ADMIN — Medication 650 MILLIGRAM(S): at 05:23

## 2022-10-06 RX ADMIN — Medication 75 MICROGRAM(S): at 05:25

## 2022-10-06 RX ADMIN — CAPTOPRIL 25 MILLIGRAM(S): 12.5 TABLET ORAL at 05:24

## 2022-10-06 RX ADMIN — CARVEDILOL PHOSPHATE 6.25 MILLIGRAM(S): 80 CAPSULE, EXTENDED RELEASE ORAL at 05:24

## 2022-10-06 RX ADMIN — FELODIPINE 2.5 MILLIGRAM(S): 5 TABLET, FILM COATED, EXTENDED RELEASE ORAL at 05:24

## 2022-10-06 RX ADMIN — CAPTOPRIL 25 MILLIGRAM(S): 12.5 TABLET ORAL at 15:22

## 2022-10-06 RX ADMIN — FELODIPINE 2.5 MILLIGRAM(S): 5 TABLET, FILM COATED, EXTENDED RELEASE ORAL at 16:23

## 2022-10-06 RX ADMIN — CAPTOPRIL 25 MILLIGRAM(S): 12.5 TABLET ORAL at 21:56

## 2022-10-06 RX ADMIN — Medication 500 MILLIGRAM(S): at 05:27

## 2022-10-06 RX ADMIN — Medication 500 MILLIGRAM(S): at 18:10

## 2022-10-06 RX ADMIN — Medication 1 TABLET(S): at 15:21

## 2022-10-06 RX ADMIN — Medication 650 MILLIGRAM(S): at 15:23

## 2022-10-06 RX ADMIN — Medication 100 MILLIGRAM(S): at 15:22

## 2022-10-06 NOTE — PROGRESS NOTE ADULT - ASSESSMENT
72F PMHx CAD, Lupus, Hypothyroidism, MCTD with Raynaud's and digital cold sensitivity, HTN, CKD3, valvular heart disease, and recent COVID in June 2022 admitted to OSH for AHRF, now with c/f scleroderma renal crisis, transferred to [facility] for rheumatology eval; currently pending IR renal biopsy.

## 2022-10-06 NOTE — PROGRESS NOTE ADULT - PROBLEM SELECTOR PLAN 1
SCr 2.80 on admission (baseline 0.94 10/2021 and 1.19 6/2022); DDx includes b/l renal artery stenosis vs scleroderma renal crisis vs. lupus nephritis  - no overt volume overload noted; BP presently stable  - nephrology following, appreciate recs: per nephrology, NED may be in setting of CM, valvular heart disease vs. CTD  - rheum following, appreciate recs- no further workup indicated at this time  - US duplex kidneys (9/30) w/o SHILA  - IR renal bx scheduled for today 10/6; per nephrology, can d/c the next day after bx  - c/w captopril 25mg TID  - c/w felodopine 2.5mg qd  - monitor BMP daily; avoid nephrotoxins

## 2022-10-06 NOTE — CONSULT NOTE ADULT - ATTENDING COMMENTS
as above  DW primary team
72F PMHx CAD, Lupus, Hypothyroidism, MCTD with Raynaud's and digital cold sensitivity, HTN, CKD3, valvular heart disease, and recent COVID in June 2022 admitted to OSH for AHRF, now with c/f scleroderma renal crisis, transferred to [facility] for rheumatology eval; currently pending IR renal biopsy however BP too high so was canceled today. Hematology consulted for elevated FLC ratio. Patient when seen would really like to go home and undergo an outpatient workup    #r/o MM  - spep normal, IF normal, Ig panel IgG elevated, FLC ratio elevated 2.6, serum/urine kappa, lamda elevated, upep normal, urine FLC ratio normal, no bence izaguirre protein in urine  - scleroderma renal crisis would explain elevated serum and urine kappa, lamda, however, elevated FLC ratio is concerning; anemia appears to be 2/2 to inflammation (ferritin elevated), and creatinine can be explained by scleroderma renal crisis  - defer w/u of Bmbx to outpatient, kidney biopsy takes precedence.

## 2022-10-06 NOTE — PROGRESS NOTE ADULT - PROBLEM SELECTOR PLAN 7
DVT PPx: subQ heparin; holding currently for IR renal bx 10/6  Diet: NPO currently for IR renal bx 10/6  Dispo: home day after renal bx

## 2022-10-06 NOTE — PROGRESS NOTE ADULT - PROBLEM SELECTOR PLAN 4
Confirmed with her rheumatologist that she has features of scleroderma and SLE  - rheum following; appreciate recs   - labs and exam concerning for possible scleroderma renal crisis   - c/w prednisone taper: 7.5mg x 3d followed by 5mg x 3d

## 2022-10-06 NOTE — PROGRESS NOTE ADULT - SUBJECTIVE AND OBJECTIVE BOX
[facility] NEPHROLOGY SERVICES, [facility]  NEPHROLOGY AND HYPERTENSION  [address]  [address]  [place] [address]  [phone]    MD GERALDINE WINSTON MD ANDREY GONCHARUK, MD MADHU KORRAPATI, MD YELENA ROSENBERG, MD BINNY KOSHY, MD CHRISTOPHER CAPUTO, MD EDWARD BOVER, MD          Patient events noted    MEDICATIONS  (STANDING):  allopurinol 100 milliGRAM(s) Oral daily  captopril 25 milliGRAM(s) Oral three times a day  cephalexin 500 milliGRAM(s) Oral every 12 hours  lactobacillus acidophilus 1 Tablet(s) Oral daily  levothyroxine 75 MICROGram(s) Oral daily  LORazepam     Tablet 0.5 milliGRAM(s) Oral once  polyethylene glycol 3350 17 Gram(s) Oral daily  predniSONE   Tablet 7.5 milliGRAM(s) Oral daily  senna 2 Tablet(s) Oral at bedtime  sodium bicarbonate 650 milliGRAM(s) Oral three times a day    MEDICATIONS  (PRN):  acetaminophen     Tablet .. 650 milliGRAM(s) Oral every 6 hours PRN Temp greater or equal to 38C (100.4F), Mild Pain (1 - 3)  melatonin 3 milliGRAM(s) Oral at bedtime PRN Insomnia  nicotine - Inhaler 1 Each Inhalation every 4 hours PRN nicotine dependence  nitroglycerin     SubLingual 0.4 milliGRAM(s) SubLingual every 5 minutes PRN Chest Pain      10-05-22 @ 07:01  -  10-06-22 @ 07:00  --------------------------------------------------------  IN: 240 mL / OUT: 0 mL / NET: 240 mL      PHYSICAL EXAM:      T(C): 36.2 (10-06-22 @ 16:24), Max: 37.2 (10-06-22 @ 04:18)  HR: 68 (10-06-22 @ 19:08) (61 - 73)  BP: 144/81 (10-06-22 @ 19:08) (125/67 - 173/69)  RR: 18 (10-06-22 @ 19:08) (17 - 18)  SpO2: 98% (10-06-22 @ 19:08) (95% - 99%)  Wt(kg): --  Lungs clear  Heart S1S2  Abd soft NT ND  Extremities:   tr edema                                    9.7    8.29  )-----------( 295      ( 06 Oct 2022 04:13 )             28.8     10-06    128<L>  |  96  |  61<H>  ----------------------------<  93  3.9   |  19<L>  |  2.04<H>    Ca    8.9      06 Oct 2022 04:13  Phos  3.8     10-06  Mg     1.8     10-06    TPro  7.2  /  Alb  3.5  /  TBili  0.3  /  DBili  x   /  AST  15  /  ALT  21  /  AlkPhos  44  10-06      LIVER FUNCTIONS - ( 06 Oct 2022 04:13 )  Alb: 3.5 g/dL / Pro: 7.2 g/dL / ALK PHOS: 44 U/L / ALT: 21 U/L / AST: 15 U/L / GGT: x           Creatinine Trend: 2.04<--, 1.97<--, 2.12<--, 2.45<--, 2.54<--, 2.63<--        Impression/Plan:    CM, mod AR, MR  Hx SLE, MCTD, concern for Scleroderma w/Raynaud  Rheum input appr  Adm w/SOB, improved  BP elevated prior to renal biopsy, biopsy rescheduled  Pt reports being very anxious about upcoming biopsy  NED in setting of CM, valvular heart disease, CTD (Cr 1.19 - 6/4/22, Cr 0.94 - 10/25/21)  Started on ACE  No overt volume overload  Cr is improving  Continue Na Bicarb   BMP daily  Avoid nephrotoxins  Plan for renal biopsy today to help guide therapy   D/w medicine    Rene Collins MD

## 2022-10-06 NOTE — PROGRESS NOTE ADULT - PROBLEM SELECTOR PLAN 6
History of PCI   - c/w captopril 25mg TID  - c/w felodopine 2.5mg qd  - c/w coreg 6.25mg BID History of PCI   - c/w captopril 25mg TID  - c/w felodopine 2.5mg qd  - d/c'd Coreg due to risk of vasospasm per rheum recs

## 2022-10-06 NOTE — CONSULT NOTE ADULT - SUBJECTIVE AND OBJECTIVE BOX
Hematology Consult Note  ***recs not final until attending attestation***    Ugo Weiss MD PGY-4  Reachable on TEAMS    HPI:  72F CAD, Lupus, RA, Hypothyroidism, HTN, CKD3, and recent COVID in June 2022 comes in today complaining of SOB on exertion.   Patient was in usual state of health up until few days prior when she started to notice progressively worsening SOB.  Denies any chest pain, or orthopnea. No recent illness or sick contacts. No changes in medications.     Placed on BIPAP initially as she was hypoxic and later de-escalated to Nasal Canula and comfortable.  Able to talk and give me history.  Routine labs done showing elevated BNP and Creatinine. CT Chest shows pulmonary edema and lasix given. Patient admitted for further management.  (22 Sep 2022 12:17)      Allergies    hydrALAZINE (Angioedema; Rash)    Intolerances        MEDICATIONS  (STANDING):  allopurinol 100 milliGRAM(s) Oral daily  captopril 25 milliGRAM(s) Oral three times a day  cephalexin 500 milliGRAM(s) Oral every 12 hours  felodipine ER 2.5 milliGRAM(s) Oral daily  lactobacillus acidophilus 1 Tablet(s) Oral daily  levothyroxine 75 MICROGram(s) Oral daily  LORazepam     Tablet 0.5 milliGRAM(s) Oral once  polyethylene glycol 3350 17 Gram(s) Oral daily  predniSONE   Tablet 7.5 milliGRAM(s) Oral daily  senna 2 Tablet(s) Oral at bedtime  sodium bicarbonate 650 milliGRAM(s) Oral three times a day    MEDICATIONS  (PRN):  acetaminophen     Tablet .. 650 milliGRAM(s) Oral every 6 hours PRN Temp greater or equal to 38C (100.4F), Mild Pain (1 - 3)  melatonin 3 milliGRAM(s) Oral at bedtime PRN Insomnia  nicotine - Inhaler 1 Each Inhalation every 4 hours PRN nicotine dependence  nitroglycerin     SubLingual 0.4 milliGRAM(s) SubLingual every 5 minutes PRN Chest Pain      PAST MEDICAL & SURGICAL HISTORY:  COPD exacerbation          FAMILY HISTORY:      SOCIAL HISTORY: No EtOH, no tobacco    REVIEW OF SYSTEMS:    CONSTITUTIONAL: No weakness, fevers or chills  EYES/ENT: No visual changes;  No vertigo or throat pain   NECK: No pain or stiffness  RESPIRATORY: No cough, wheezing, hemoptysis; No shortness of breath  CARDIOVASCULAR: No chest pain or palpitations  GASTROINTESTINAL: No abdominal or epigastric pain. No nausea, vomiting, or hematemesis; No diarrhea or constipation. No melena or hematochezia.  GENITOURINARY: No dysuria, frequency or hematuria  NEUROLOGICAL: No numbness or weakness  SKIN: No itching, burning, rashes, or lesions   All other review of systems is negative unless indicated above.        T(F): 98.2 (10-06-22 @ 12:46), Max: 98.9 (10-06-22 @ 04:18)  HR: 61 (10-06-22 @ 12:46)  BP: 135/70 (10-06-22 @ 12:46)  RR: 18 (10-06-22 @ 12:46)  SpO2: 98% (10-06-22 @ 12:46)  Wt(kg): --    Constitutional: NAD  Eyes: EOMI, sclera non-icteric  Neck: supple  Respiratory: no inc wob  Cardiovascular: RRR,   Gastrointestinal: soft, NTND, no masses palpable,  Extremities: no cyanosis, no clubbing                          9.7    8.29  )-----------( 295      ( 06 Oct 2022 04:13 )             28.8       10-06    128<L>  |  96  |  61<H>  ----------------------------<  93  3.9   |  19<L>  |  2.04<H>    Ca    8.9      06 Oct 2022 04:13  Phos  3.8     10-06  Mg     1.8     10-06    TPro  7.2  /  Alb  3.5  /  TBili  0.3  /  DBili  x   /  AST  15  /  ALT  21  /  AlkPhos  44  10-06      Magnesium, Serum: 1.8 mg/dL (10-06 @ 04:13)  Phosphorus Level, Serum: 3.8 mg/dL (10-06 @ 04:13)      RADIOLOGY & ADDITIONAL TESTS:  Studies reviewed.     Hematology Consult Note  ***recs not final until attending attestation***    Ugo Weiss MD PGY-4  Reachable on TEAMS    HPI:  72F CAD, Lupus, RA, Hypothyroidism, HTN, CKD3, and recent COVID in June 2022 comes in today complaining of SOB on exertion.   Patient was in usual state of health up until few days prior when she started to notice progressively worsening SOB.  Denies any chest pain, or orthopnea. No recent illness or sick contacts. No changes in medications.     Placed on BIPAP initially as she was hypoxic and later de-escalated to Nasal Canula and comfortable.  Able to talk and give me history.  Routine labs done showing elevated BNP and Creatinine. CT Chest shows pulmonary edema and lasix given. Patient admitted for further management.  (22 Sep 2022 12:17)    Patient seen and examined by heme team. Pt when seen was distraught that kidney biopsy was canceled again and would like to leave the hospital and defer further workup outpatient    Allergies    hydrALAZINE (Angioedema; Rash)    Intolerances        MEDICATIONS  (STANDING):  allopurinol 100 milliGRAM(s) Oral daily  captopril 25 milliGRAM(s) Oral three times a day  cephalexin 500 milliGRAM(s) Oral every 12 hours  felodipine ER 2.5 milliGRAM(s) Oral daily  lactobacillus acidophilus 1 Tablet(s) Oral daily  levothyroxine 75 MICROGram(s) Oral daily  LORazepam     Tablet 0.5 milliGRAM(s) Oral once  polyethylene glycol 3350 17 Gram(s) Oral daily  predniSONE   Tablet 7.5 milliGRAM(s) Oral daily  senna 2 Tablet(s) Oral at bedtime  sodium bicarbonate 650 milliGRAM(s) Oral three times a day    MEDICATIONS  (PRN):  acetaminophen     Tablet .. 650 milliGRAM(s) Oral every 6 hours PRN Temp greater or equal to 38C (100.4F), Mild Pain (1 - 3)  melatonin 3 milliGRAM(s) Oral at bedtime PRN Insomnia  nicotine - Inhaler 1 Each Inhalation every 4 hours PRN nicotine dependence  nitroglycerin     SubLingual 0.4 milliGRAM(s) SubLingual every 5 minutes PRN Chest Pain      PAST MEDICAL & SURGICAL HISTORY:  COPD exacerbation          FAMILY HISTORY:      SOCIAL HISTORY: No EtOH, no tobacco    REVIEW OF SYSTEMS:    CONSTITUTIONAL: No weakness, fevers or chills, + weight loss during hospital admission  EYES/ENT: No visual changes;  No vertigo or throat pain   NECK: No pain or stiffness  RESPIRATORY: No cough, wheezing, hemoptysis; No shortness of breath  CARDIOVASCULAR: No chest pain or palpitations  GASTROINTESTINAL: No abdominal or epigastric pain  GENITOURINARY: No dysuria, frequency or hematuria  NEUROLOGICAL: No numbness or weakness  MSK: pain in lateral leg, patient states she has IT band syndrome  SKIN: No itching, burning, rashes, or lesions   All other review of systems is negative unless indicated above.        T(F): 98.2 (10-06-22 @ 12:46), Max: 98.9 (10-06-22 @ 04:18)  HR: 61 (10-06-22 @ 12:46)  BP: 135/70 (10-06-22 @ 12:46)  RR: 18 (10-06-22 @ 12:46)  SpO2: 98% (10-06-22 @ 12:46)  Wt(kg): --    Constitutional: NAD  Eyes: EOMI, sclera non-icteric  Neck: supple  Respiratory: no inc wob  Cardiovascular: RRR,   Gastrointestinal: soft, NTND, no masses palpable,  Extremities: no cyanosis, no clubbing                          9.7    8.29  )-----------( 295      ( 06 Oct 2022 04:13 )             28.8       10-06    128<L>  |  96  |  61<H>  ----------------------------<  93  3.9   |  19<L>  |  2.04<H>    Ca    8.9      06 Oct 2022 04:13  Phos  3.8     10-06  Mg     1.8     10-06    TPro  7.2  /  Alb  3.5  /  TBili  0.3  /  DBili  x   /  AST  15  /  ALT  21  /  AlkPhos  44  10-06      Magnesium, Serum: 1.8 mg/dL (10-06 @ 04:13)  Phosphorus Level, Serum: 3.8 mg/dL (10-06 @ 04:13)      RADIOLOGY & ADDITIONAL TESTS:  Studies reviewed.

## 2022-10-06 NOTE — PROGRESS NOTE ADULT - PROBLEM SELECTOR PLAN 2
SBPs 110s-180s throughout this admission  - c/w captopril 25mg TID  - c/w felodopine 2.5mg qd  - c/w coreg 6.25mg BID SBPs 110s-180s throughout this admission  - c/w captopril 25mg TID  - c/w felodopine 2.5mg qd  - d/c'd Coreg due to risk of vasospasm per rheum recs

## 2022-10-06 NOTE — CONSULT NOTE ADULT - ASSESSMENT
72F PMHx CAD, Lupus, Hypothyroidism, MCTD with Raynaud's and digital cold sensitivity, HTN, CKD3, valvular heart disease, and recent COVID in June 2022 admitted to OSH for AHRF, now with c/f scleroderma renal crisis, transferred to [facility] for rheumatology eval; currently pending IR renal biopsy. Hematology consulted for elevated FLC ratio    #r/o MM  - spep normal, IF normal, Ig panel IgG elevated, FLC ratio elevated 2.6, serum/urine kappa, lamda elevated, upep normal, urine FLC ratio normal, no bence izaguirre protein in urine  - scleroderma renal crisis would explain elevated serum and urine kappa, lamda, however, elevated FLC ratio is concerning; anemia appears to be 2/2 to inflammation (ferritin elevated), and creatinine can be explained by scleroderma renal crisis but will ask patient for Bmbx to confirm there is no FLC MM disease  - send a skeletal survey 72F PMHx CAD, Lupus, Hypothyroidism, MCTD with Raynaud's and digital cold sensitivity, HTN, CKD3, valvular heart disease, and recent COVID in June 2022 admitted to OSH for AHRF, now with c/f scleroderma renal crisis, transferred to [facility] for rheumatology eval; currently pending IR renal biopsy however BP too high so was canceled today. Hematology consulted for elevated FLC ratio. Patient when seen would really like to go home and undergo an outpatient workup    #r/o MM  - spep normal, IF normal, Ig panel IgG elevated, FLC ratio elevated 2.6, serum/urine kappa, lamda elevated, upep normal, urine FLC ratio normal, no bence izaguirre protein in urine  - scleroderma renal crisis would explain elevated serum and urine kappa, lamda, however, elevated FLC ratio is concerning; anemia appears to be 2/2 to inflammation (ferritin elevated), and creatinine can be explained by scleroderma renal crisis  - defer w/u of Bmbx to outpatient, kidney biopsy takes precendence, please include f/u w/ Dr. Peters at [facility] on discharge

## 2022-10-07 VITALS
HEART RATE: 73 BPM | DIASTOLIC BLOOD PRESSURE: 72 MMHG | OXYGEN SATURATION: 97 % | SYSTOLIC BLOOD PRESSURE: 124 MMHG | RESPIRATION RATE: 18 BRPM | TEMPERATURE: 98 F

## 2022-10-07 LAB
% ALBUMIN: 49.8 % — SIGNIFICANT CHANGE UP
% ALPHA 1: 5.6 % — SIGNIFICANT CHANGE UP
% ALPHA 2: 12.2 % — SIGNIFICANT CHANGE UP
% BETA: 10.9 % — SIGNIFICANT CHANGE UP
% GAMMA: 21.5 % — SIGNIFICANT CHANGE UP
ALBUMIN SERPL ELPH-MCNC: 3.6 G/DL — SIGNIFICANT CHANGE UP (ref 3.6–5.5)
ALBUMIN/GLOB SERPL ELPH: 1 RATIO — SIGNIFICANT CHANGE UP
ALPHA1 GLOB SERPL ELPH-MCNC: 0.4 G/DL — SIGNIFICANT CHANGE UP (ref 0.1–0.4)
ALPHA2 GLOB SERPL ELPH-MCNC: 0.9 G/DL — SIGNIFICANT CHANGE UP (ref 0.5–1)
ANION GAP SERPL CALC-SCNC: 13 MMOL/L — SIGNIFICANT CHANGE UP (ref 5–17)
APTT BLD: 27.7 SEC — SIGNIFICANT CHANGE UP (ref 27.5–35.5)
B-GLOBULIN SERPL ELPH-MCNC: 0.8 G/DL — SIGNIFICANT CHANGE UP (ref 0.5–1)
BUN SERPL-MCNC: 61 MG/DL — HIGH (ref 7–23)
CALCIUM SERPL-MCNC: 8.9 MG/DL — SIGNIFICANT CHANGE UP (ref 8.4–10.5)
CHLORIDE SERPL-SCNC: 96 MMOL/L — SIGNIFICANT CHANGE UP (ref 96–108)
CO2 SERPL-SCNC: 19 MMOL/L — LOW (ref 22–31)
CREAT SERPL-MCNC: 2.07 MG/DL — HIGH (ref 0.5–1.3)
EGFR: 25 ML/MIN/1.73M2 — LOW
GAMMA GLOBULIN: 1.6 G/DL — SIGNIFICANT CHANGE UP (ref 0.6–1.6)
GLUCOSE SERPL-MCNC: 89 MG/DL — SIGNIFICANT CHANGE UP (ref 70–99)
HCT VFR BLD CALC: 30.2 % — LOW (ref 34.5–45)
HGB BLD-MCNC: 9.8 G/DL — LOW (ref 11.5–15.5)
INR BLD: 1.06 RATIO — SIGNIFICANT CHANGE UP (ref 0.88–1.16)
MAGNESIUM SERPL-MCNC: 1.8 MG/DL — SIGNIFICANT CHANGE UP (ref 1.6–2.6)
MCHC RBC-ENTMCNC: 30.7 PG — SIGNIFICANT CHANGE UP (ref 27–34)
MCHC RBC-ENTMCNC: 32.5 GM/DL — SIGNIFICANT CHANGE UP (ref 32–36)
MCV RBC AUTO: 94.7 FL — SIGNIFICANT CHANGE UP (ref 80–100)
NRBC # BLD: 0 /100 WBCS — SIGNIFICANT CHANGE UP (ref 0–0)
PHOSPHATE SERPL-MCNC: 3.9 MG/DL — SIGNIFICANT CHANGE UP (ref 2.5–4.5)
PLATELET # BLD AUTO: 313 K/UL — SIGNIFICANT CHANGE UP (ref 150–400)
POTASSIUM SERPL-MCNC: 3.9 MMOL/L — SIGNIFICANT CHANGE UP (ref 3.5–5.3)
POTASSIUM SERPL-SCNC: 3.9 MMOL/L — SIGNIFICANT CHANGE UP (ref 3.5–5.3)
PROT PATTERN SERPL ELPH-IMP: SIGNIFICANT CHANGE UP
PROTHROM AB SERPL-ACNC: 12.2 SEC — SIGNIFICANT CHANGE UP (ref 10.5–13.4)
RBC # BLD: 3.19 M/UL — LOW (ref 3.8–5.2)
RBC # FLD: 14.1 % — SIGNIFICANT CHANGE UP (ref 10.3–14.5)
RENIN PLAS-CCNC: 16.22 NG/ML/HR — HIGH (ref 0.17–5.38)
SODIUM SERPL-SCNC: 128 MMOL/L — LOW (ref 135–145)
WBC # BLD: 7.57 K/UL — SIGNIFICANT CHANGE UP (ref 3.8–10.5)
WBC # FLD AUTO: 7.57 K/UL — SIGNIFICANT CHANGE UP (ref 3.8–10.5)

## 2022-10-07 PROCEDURE — 99239 HOSP IP/OBS DSCHRG MGMT >30: CPT

## 2022-10-07 RX ORDER — FELODIPINE 5 MG/1
1 TABLET, FILM COATED, EXTENDED RELEASE ORAL
Qty: 30 | Refills: 0
Start: 2022-10-07 | End: 2022-11-05

## 2022-10-07 RX ORDER — CAPTOPRIL 12.5 MG/1
3 TABLET ORAL
Qty: 270 | Refills: 0
Start: 2022-10-07 | End: 2022-11-05

## 2022-10-07 RX ORDER — CAPTOPRIL 12.5 MG/1
1 TABLET ORAL
Qty: 90 | Refills: 0
Start: 2022-10-07 | End: 2022-11-05

## 2022-10-07 RX ORDER — CAPTOPRIL 12.5 MG/1
25 TABLET ORAL ONCE
Refills: 0 | Status: COMPLETED | OUTPATIENT
Start: 2022-10-07 | End: 2022-10-07

## 2022-10-07 RX ORDER — LISINOPRIL 2.5 MG/1
1 TABLET ORAL
Qty: 90 | Refills: 0
Start: 2022-10-07 | End: 2023-01-01

## 2022-10-07 RX ORDER — FELODIPINE 5 MG/1
1 TABLET, FILM COATED, EXTENDED RELEASE ORAL
Qty: 0 | Refills: 0 | DISCHARGE
Start: 2022-10-07

## 2022-10-07 RX ORDER — CAPTOPRIL 12.5 MG/1
37.5 TABLET ORAL THREE TIMES A DAY
Refills: 0 | Status: DISCONTINUED | OUTPATIENT
Start: 2022-10-07 | End: 2022-10-07

## 2022-10-07 RX ADMIN — Medication 650 MILLIGRAM(S): at 13:03

## 2022-10-07 RX ADMIN — Medication 500 MILLIGRAM(S): at 05:17

## 2022-10-07 RX ADMIN — CAPTOPRIL 25 MILLIGRAM(S): 12.5 TABLET ORAL at 04:37

## 2022-10-07 RX ADMIN — Medication 75 MICROGRAM(S): at 05:16

## 2022-10-07 RX ADMIN — Medication 7.5 MILLIGRAM(S): at 05:16

## 2022-10-07 RX ADMIN — Medication 1 TABLET(S): at 13:03

## 2022-10-07 RX ADMIN — Medication 0.5 MILLIGRAM(S): at 10:52

## 2022-10-07 RX ADMIN — CAPTOPRIL 25 MILLIGRAM(S): 12.5 TABLET ORAL at 05:17

## 2022-10-07 RX ADMIN — CAPTOPRIL 25 MILLIGRAM(S): 12.5 TABLET ORAL at 13:03

## 2022-10-07 RX ADMIN — Medication 100 MILLIGRAM(S): at 13:04

## 2022-10-07 RX ADMIN — FELODIPINE 5 MILLIGRAM(S): 5 TABLET, FILM COATED, EXTENDED RELEASE ORAL at 05:17

## 2022-10-07 RX ADMIN — Medication 650 MILLIGRAM(S): at 05:16

## 2022-10-07 NOTE — PROGRESS NOTE ADULT - PROBLEM SELECTOR PROBLEM 4
Hypertension
Hypertension
H/O mixed connective tissue disease
Hypertension
H/O mixed connective tissue disease

## 2022-10-07 NOTE — PROGRESS NOTE ADULT - PROBLEM SELECTOR PROBLEM 5
Hypothyroidism
Hypertension
Hypothyroidism
Hypothyroidism
Hypertension

## 2022-10-07 NOTE — PROGRESS NOTE ADULT - PROBLEM SELECTOR PROBLEM 2
Acute hypoxemic respiratory failure
Acute hypoxemic respiratory failure
Hypertension
Hyponatremia
Acute hypoxemic respiratory failure
Hypertension
Hypertension
Hyponatremia

## 2022-10-07 NOTE — PROGRESS NOTE ADULT - ASSESSMENT
72F PMHx CAD, Lupus, Hypothyroidism, MCTD with Raynaud's and digital cold sensitivity, HTN, CKD3, valvular heart disease, and recent COVID in June 2022 admitted to OSH for AHRF, now with c/f scleroderma renal crisis, transferred to [facility] for rheumatology eval; currently pending IR renal biopsy.

## 2022-10-07 NOTE — PROGRESS NOTE ADULT - PROVIDER SPECIALTY LIST ADULT
Cardiology
Cardiology
Hospitalist
Nephrology
Pulmonology
Pulmonology
Rheumatology
Rheumatology
Cardiology
Cardiology
Hospitalist
Nephrology
Nephrology
Cardiology
Critical Care
Hospitalist
Nephrology
Nephrology
Pulmonology
Pulmonology
Critical Care
Hospitalist
Hospitalist
Internal Medicine
Nephrology
Pulmonology
Rheumatology
Hospitalist
Internal Medicine

## 2022-10-07 NOTE — PROGRESS NOTE ADULT - PROBLEM SELECTOR PLAN 2
SBPs 110s-180s throughout this admission  - c/w captopril 25mg TID  - c/w felodopine 2.5mg qd  - d/c'd Coreg due to risk of vasospasm per rheum recs SBPs 110s-180s throughout this admission  - c/w captopril 25mg TID  - c/w felodopine 5mg qd  - d/c'd Coreg due to risk of vasospasm per rheum recs

## 2022-10-07 NOTE — PROGRESS NOTE ADULT - ATTENDING COMMENTS
72F with hx of SLE, MCTD with AHRF with acute on chronic renal disease here for further evaluation of acute kidney injury. Ddx includes scleroderma renal crisis, less likely lupus nephritis. Complement levels wnl. Cr improving. Complicated with ep of hypotension at OSH when captopril started. On this admission, patient persistently hypertensive despite increasing captopril, asymptomatic. Renal biopsy cancelled twice due to uncontrolled HTN. Today, BP improved on captopril 25mg TID and felodpine 5mg.     IR biopsy today cancelled due to htn with SBP to 170s s/p ativan. Discussed with IR attending and unable to add on for later today and requires another 24hour of BP control. Team spoke with nephro and will pursue renal biopsy outpatient.     Discharge with outpatient bone marrow and renal biopsy.     d/w HS5    Deepika Alejandra MD  Division of Hospital Medicine  Available on [employer] Teams

## 2022-10-07 NOTE — CHART NOTE - NSCHARTNOTESELECT_GEN_ALL_CORE
Event Note
IR/Event Note
Nutrition Services
Event Note
Event Note
IR/Event Note
Off Service Note
Transfer Note

## 2022-10-07 NOTE — CHART NOTE - NSCHARTNOTEFT_GEN_A_CORE
IR    patient was again brought down to interventional radiology for planned renal parenchymal biopsy. blood pressure here in IR is noted to have UAR=[phone] on 3 separate measurements. due to increased risk of hemorrhage, case is again cancelled for today.    -patient tentatively rescheduled for Monday, 10/10, pending improvement in BP  -as per team, patient may not stay over the weekend, in which case the patient should follow up with her PCP/cardiologist for improved BP control and can call [phone] to schedule outpatient biopsy  -NPO at midnight Sunday into Monday  -repeat AM labs  -COVID PCR within 5 days of procedure  -discussed with primary team    Serg Villalobos MD  PGY-V, Interventional Radiology    -Available on [employer] TEAMS for all non-urgent questions  -Emergent issues: [facility]-p.[phone]; [facility]-[phone] ([phone])  -Non-emergent consults: Please place a [facility] order "Consult-Interventional Radiology" with an appropriate callback number  -Scheduling questions: [facility]: [phone]; [facility]: [phone]  -Clinic/Outpatient booking: [facility]: [phone]; [facility]: [phone]

## 2022-10-07 NOTE — PROGRESS NOTE ADULT - SUBJECTIVE AND OBJECTIVE BOX
*******************************  Jesús Fonseca MD (PGY-1)  Internal Medicine  Contact via [employer] TEAMS  [facility] Pager: [phone]  [facility] Pager: [phone]  *******************************    PROGRESS NOTE:     Patient is a 72y old  Female who presents with a chief complaint of SOB (06 Oct 2022 19:33)    INTERVAL EVENTS: IR renal bx cancelled once again due to elevated BPs (SBP 170s in IR suite). Pt. w/ 1 time , received captopril 25mg x1 with improvement. Otherwise no other overnight events.     SUBJECTIVE: Patient seen and examined at bedside. This morning, the patient is comfortable and doing well. No acute complaints. Denies fevers, chills, N/V/D, chest pain, SOB, abdominal pain.    MEDICATIONS  (STANDING):  allopurinol 100 milliGRAM(s) Oral daily  captopril 25 milliGRAM(s) Oral three times a day  felodipine ER 5 milliGRAM(s) Oral daily  lactobacillus acidophilus 1 Tablet(s) Oral daily  levothyroxine 75 MICROGram(s) Oral daily  LORazepam     Tablet 0.5 milliGRAM(s) Oral once  polyethylene glycol 3350 17 Gram(s) Oral daily  predniSONE   Tablet 7.5 milliGRAM(s) Oral daily  senna 2 Tablet(s) Oral at bedtime  sodium bicarbonate 650 milliGRAM(s) Oral three times a day    MEDICATIONS  (PRN):  acetaminophen     Tablet .. 650 milliGRAM(s) Oral every 6 hours PRN Temp greater or equal to 38C (100.4F), Mild Pain (1 - 3)  melatonin 3 milliGRAM(s) Oral at bedtime PRN Insomnia  nicotine - Inhaler 1 Each Inhalation every 4 hours PRN nicotine dependence  nitroglycerin     SubLingual 0.4 milliGRAM(s) SubLingual every 5 minutes PRN Chest Pain    CAPILLARY BLOOD GLUCOSE    I&O's Summary    05 Oct 2022 07:01  -  06 Oct 2022 07:00  --------------------------------------------------------  IN: 240 mL / OUT: 0 mL / NET: 240 mL    06 Oct 2022 07:01  -  07 Oct 2022 06:49  --------------------------------------------------------  IN: 240 mL / OUT: 0 mL / NET: 240 mL    PHYSICAL EXAM:  Vital Signs Last 24 Hrs  T(C): 36.8 (07 Oct 2022 04:31), Max: 37.1 (06 Oct 2022 20:25)  T(F): 98.2 (07 Oct 2022 04:31), Max: 98.7 (06 Oct 2022 20:25)  HR: 64 (07 Oct 2022 06:20) (61 - 73)  BP: 143/69 (07 Oct 2022 06:20) (123/72 - 173/69)  BP(mean): 100 (06 Oct 2022 14:05) (93 - 100)  RR: 18 (07 Oct 2022 04:31) (18 - 18)  SpO2: 97% (07 Oct 2022 04:31) (93% - 98%)    Parameters below as of 07 Oct 2022 04:31  Patient On (Oxygen Delivery Method): room air    GENERAL: NAD, lying in bed comfortably  HEART: S1, S2, Regular rate and rhythm, no murmurs, rubs, or gallops  LUNGS: Unlabored respirations, clear to auscultation bilaterally, no crackles, wheezing, or rhonchi  ABDOMEN: Soft, nontender, nondistended, +BS  EXTREMITIES: 2+ peripheral pulses bilaterally. No clubbing, cyanosis, or edema  NERVOUS SYSTEM:  A&Ox3, no focal deficits   SKIN: No rashes or lesions    LABS:                        9.8    7.57  )-----------( 313      ( 07 Oct 2022 05:27 )             30.2     10-07    128<L>  |  96  |  61<H>  ----------------------------<  89  3.9   |  19<L>  |  2.07<H>    Ca    8.9      07 Oct 2022 05:28  Phos  3.9     10-07  Mg     1.8     10-07    TPro  7.2  /  Alb  3.5  /  TBili  0.3  /  DBili  x   /  AST  15  /  ALT  21  /  AlkPhos  44  10-06    PT/INR - ( 07 Oct 2022 05:27 )   PT: 12.2 sec;   INR: 1.06 ratio    PTT - ( 07 Oct 2022 05:27 )  PTT:27.7 sec    RADIOLOGY & ADDITIONAL TESTS:  Results Reviewed:   Imaging Personally Reviewed:  Electrocardiogram Personally Reviewed:  Tele:    COORDINATION OF CARE:  Care Discussed with Consultants/Other Providers [Y/N]:  Prior or Outpatient Records Reviewed [Y/N]: *******************************  Jesús Fonseca MD (PGY-1)  Internal Medicine  Contact via [employer] TEAMS  [facility] Pager: [phone]  [facility] Pager: [phone]  *******************************    PROGRESS NOTE:     Patient is a 72y old  Female who presents with a chief complaint of SOB (06 Oct 2022 19:33)    INTERVAL EVENTS: IR renal bx cancelled once again 10/6 due to elevated BPs (SBP 170s in IR suite). Pt. w/ 1 time , received captopril 25mg x1 with improvement. Otherwise no other overnight events.     SUBJECTIVE: Patient seen and examined at bedside. This morning, the patient is comfortable and doing well. Feels anxious about her procedure being postponed so many times. Otherwise has no acute complaints. Denies fevers, chills, N/V/D, chest pain, SOB, abdominal pain.    MEDICATIONS  (STANDING):  allopurinol 100 milliGRAM(s) Oral daily  captopril 25 milliGRAM(s) Oral three times a day  felodipine ER 5 milliGRAM(s) Oral daily  lactobacillus acidophilus 1 Tablet(s) Oral daily  levothyroxine 75 MICROGram(s) Oral daily  LORazepam     Tablet 0.5 milliGRAM(s) Oral once  polyethylene glycol 3350 17 Gram(s) Oral daily  predniSONE   Tablet 7.5 milliGRAM(s) Oral daily  senna 2 Tablet(s) Oral at bedtime  sodium bicarbonate 650 milliGRAM(s) Oral three times a day    MEDICATIONS  (PRN):  acetaminophen     Tablet .. 650 milliGRAM(s) Oral every 6 hours PRN Temp greater or equal to 38C (100.4F), Mild Pain (1 - 3)  melatonin 3 milliGRAM(s) Oral at bedtime PRN Insomnia  nicotine - Inhaler 1 Each Inhalation every 4 hours PRN nicotine dependence  nitroglycerin     SubLingual 0.4 milliGRAM(s) SubLingual every 5 minutes PRN Chest Pain    CAPILLARY BLOOD GLUCOSE    I&O's Summary    05 Oct 2022 07:01  -  06 Oct 2022 07:00  --------------------------------------------------------  IN: 240 mL / OUT: 0 mL / NET: 240 mL    06 Oct 2022 07:01  -  07 Oct 2022 06:49  --------------------------------------------------------  IN: 240 mL / OUT: 0 mL / NET: 240 mL    PHYSICAL EXAM:  Vital Signs Last 24 Hrs  T(C): 36.8 (07 Oct 2022 04:31), Max: 37.1 (06 Oct 2022 20:25)  T(F): 98.2 (07 Oct 2022 04:31), Max: 98.7 (06 Oct 2022 20:25)  HR: 64 (07 Oct 2022 06:20) (61 - 73)  BP: 143/69 (07 Oct 2022 06:20) (123/72 - 173/69)  BP(mean): 100 (06 Oct 2022 14:05) (93 - 100)  RR: 18 (07 Oct 2022 04:31) (18 - 18)  SpO2: 97% (07 Oct 2022 04:31) (93% - 98%)    Parameters below as of 07 Oct 2022 04:31  Patient On (Oxygen Delivery Method): room air    GENERAL: NAD, lying in bed comfortably  HEART: S1, S2, Regular rate and rhythm, no murmurs, rubs, or gallops  LUNGS: Unlabored respirations, clear to auscultation bilaterally, no crackles, wheezing, or rhonchi  ABDOMEN: Soft, nontender, nondistended, +BS  EXTREMITIES: 2+ peripheral pulses bilaterally. No clubbing, cyanosis, or edema  NERVOUS SYSTEM:  A&Ox3, no focal deficits   SKIN: No rashes or lesions    LABS:                        9.8    7.57  )-----------( 313      ( 07 Oct 2022 05:27 )             30.2     10-07    128<L>  |  96  |  61<H>  ----------------------------<  89  3.9   |  19<L>  |  2.07<H>    Ca    8.9      07 Oct 2022 05:28  Phos  3.9     10-07  Mg     1.8     10-07    TPro  7.2  /  Alb  3.5  /  TBili  0.3  /  DBili  x   /  AST  15  /  ALT  21  /  AlkPhos  44  10-06    PT/INR - ( 07 Oct 2022 05:27 )   PT: 12.2 sec;   INR: 1.06 ratio    PTT - ( 07 Oct 2022 05:27 )  PTT:27.7 sec    RADIOLOGY & ADDITIONAL TESTS:  Results Reviewed:   Imaging Personally Reviewed:  Electrocardiogram Personally Reviewed:  Tele:    COORDINATION OF CARE:  Care Discussed with Consultants/Other Providers [Y/N]:  Prior or Outpatient Records Reviewed [Y/N]:

## 2022-10-07 NOTE — PROGRESS NOTE ADULT - PROBLEM SELECTOR PLAN 4
Confirmed with her rheumatologist that she has features of scleroderma and SLE  - rheum following; appreciate recs   - labs and exam concerning for possible scleroderma renal crisis   - c/w prednisone taper: 7.5mg x 3d followed by 5mg x 3d Confirmed with her rheumatologist that she has features of scleroderma and SLE  - rheum following; appreciate recs   - labs and exam concerning for possible scleroderma renal crisis   - c/w prednisone taper: 7.5mg x 3d (10/6-10/8) followed by 5mg x 3d (10/9-10/11)

## 2022-10-07 NOTE — CHART NOTE - NSCHARTNOTEFT_GEN_A_CORE
Rheumatology    The kidney biopsy was cancelled again due to high BP. Unfortunately, the bx was cancelled four times and patient became very upset and ended up being DC.     I talked to her that the scleroderma renal crisis is very dangerous and the medication needs to be titrated w/close monitoring in the hospital. However, she left before able to round w/our team.     Patient last BP was 124/74 mmHg and she was DC on captopril 37.5 mg TID and felodipine 5 mg daily. I was able to communicate with the daughter, Joanne Solorzano, and agreed to bring the patient to our clinic. [id number] [place] [address] on Monday 10/10/22 around 9 am.     YARA Lofton MD  PGY-4 Rheumatology Fellow  Pager: [phone]   Available in teams

## 2022-10-07 NOTE — PROGRESS NOTE ADULT - PROBLEM SELECTOR PLAN 6
History of PCI   - c/w captopril 25mg TID  - c/w felodopine 2.5mg qd  - d/c'd Coreg due to risk of vasospasm per rheum recs History of PCI   - c/w captopril 25mg TID  - c/w felodopine 5mg qd  - d/c'd Coreg due to risk of vasospasm per rheum recs

## 2022-10-07 NOTE — PROGRESS NOTE ADULT - NUTRITIONAL ASSESSMENT
This patient has been assessed with a concern for Malnutrition and has been determined to have a diagnosis/diagnoses of Severe protein-calorie malnutrition.    This patient is being managed with:   Diet Regular-  Entered: Oct  1 2022 12:19PM    
This patient has been assessed with a concern for Malnutrition and has been determined to have a diagnosis/diagnoses of Severe protein-calorie malnutrition.    This patient is being managed with:   Diet Regular-  Entered: Sep 22 2022  5:07PM    
This patient has been assessed with a concern for Malnutrition and has been determined to have a diagnosis/diagnoses of Severe protein-calorie malnutrition.    This patient is being managed with:   Diet NPO after Midnight-     NPO Start Date: 03-Oct-2022   NPO Start Time: 23:59  Except Medications  Entered: Oct  3 2022 11:50AM    Diet Regular-  Entered: Oct  1 2022 12:19PM    
This patient has been assessed with a concern for Malnutrition and has been determined to have a diagnosis/diagnoses of Severe protein-calorie malnutrition.    This patient is being managed with:   Diet Regular-  Entered: Sep 22 2022  5:07PM    
This patient has been assessed with a concern for Malnutrition and has been determined to have a diagnosis/diagnoses of Severe protein-calorie malnutrition.    This patient is being managed with:   Diet NPO after Midnight-     NPO Start Date: 04-Oct-2022   NPO Start Time: 23:59  Except Medications  Entered: Oct  4 2022 11:49AM    Diet Regular-  Entered: Oct  1 2022 12:19PM    
This patient has been assessed with a concern for Malnutrition and has been determined to have a diagnosis/diagnoses of Severe protein-calorie malnutrition.    This patient is being managed with:   Diet Regular-  Entered: Sep 22 2022  5:07PM    
This patient has been assessed with a concern for Malnutrition and has been determined to have a diagnosis/diagnoses of Severe protein-calorie malnutrition.    This patient is being managed with:   Diet Regular-  Entered: Sep 22 2022  5:07PM    
This patient has been assessed with a concern for Malnutrition and has been determined to have a diagnosis/diagnoses of Severe protein-calorie malnutrition.    This patient is being managed with:   Diet Regular-  DASH/TLC {Sodium & Cholesterol Restricted} (DASH)  Entered: Sep 29 2022  7:55PM    
This patient has been assessed with a concern for Malnutrition and has been determined to have a diagnosis/diagnoses of Severe protein-calorie malnutrition.    This patient is being managed with:   Diet Regular-  Entered: Oct  1 2022 12:19PM    
This patient has been assessed with a concern for Malnutrition and has been determined to have a diagnosis/diagnoses of Severe protein-calorie malnutrition.    This patient is being managed with:   Diet NPO after Midnight-     NPO Start Date: 06-Oct-2022   NPO Start Time: 23:59  Except Medications  Entered: Oct  6 2022  3:55PM    Diet Regular-  Entered: Oct  1 2022 12:19PM    
This patient has been assessed with a concern for Malnutrition and has been determined to have a diagnosis/diagnoses of Severe protein-calorie malnutrition.    This patient is being managed with:   Diet NPO after Midnight-     NPO Start Date: 05-Oct-2022   NPO Start Time: 23:59  Except Medications  Entered: Oct  5 2022 12:25PM    Diet Regular-  Entered: Oct  1 2022 12:19PM    
This patient has been assessed with a concern for Malnutrition and has been determined to have a diagnosis/diagnoses of Severe protein-calorie malnutrition.    This patient is being managed with:   Diet Regular-  DASH/TLC {Sodium & Cholesterol Restricted} (DASH)  Entered: Sep 29 2022  7:55PM

## 2022-10-07 NOTE — PROGRESS NOTE ADULT - PROBLEM SELECTOR PLAN 1
SCr 2.80 on admission (baseline 0.94 10/2021 and 1.19 6/2022); DDx includes b/l renal artery stenosis vs scleroderma renal crisis vs. lupus nephritis  - no overt volume overload noted; BP presently stable  - nephrology following, appreciate recs: per nephrology, NED may be in setting of CM, valvular heart disease vs. CTD  - rheum following, appreciate recs- no further workup indicated at this time  - US duplex kidneys (9/30) w/o SHILA  - IR renal bx tentatively scheduled for today 10/7; per nephrology, can d/c the next day after bx  - c/w captopril 25mg TID  - c/w felodopine 2.5mg qd  - monitor BMP daily; avoid nephrotoxins SCr 2.80 on admission (baseline 0.94 10/2021 and 1.19 6/2022); DDx includes b/l renal artery stenosis vs scleroderma renal crisis vs. lupus nephritis  - no overt volume overload noted; BP presently stable  - nephrology following, appreciate recs: per nephrology, NED may be in setting of CM, valvular heart disease vs. CTD  - rheum following, appreciate recs- no further workup indicated at this time  - US duplex kidneys (9/30) w/o SHILA  - IR renal bx was tentatively scheduled for today 10/7; howrever pressures continue to be elevated so procedure cancelled once again- pt. wishes to be d/c'd home  - c/w captopril 25mg TID  - c/w felodopine 5mg qd  - monitor BMP daily; avoid nephrotoxins

## 2022-10-07 NOTE — PROGRESS NOTE ADULT - PROBLEM SELECTOR PLAN 7
DVT PPx: subQ heparin; holding currently for IR renal bx 10/6  Diet: NPO currently for IR renal bx 10/6  Dispo: home day after renal bx DVT PPx: subQ heparin; holding currently for IR renal bx 10/7  Diet: NPO currently for IR renal bx 10/7  Dispo: home day after renal bx DVT PPx: subQ heparin; holding currently for IR renal bx 10/7 (procedure cancelled)  Diet: regular (IR renal bx cancelled)  Dispo: home as IR renal bx cancelled

## 2022-10-07 NOTE — PROGRESS NOTE ADULT - PROBLEM SELECTOR PROBLEM 3
Hyponatremia
H/O mixed connective tissue disease
H/O mixed connective tissue disease
Hyponatremia
Hyponatremia
H/O mixed connective tissue disease
Acute hypoxemic respiratory failure
Acute hypoxemic respiratory failure

## 2022-10-07 NOTE — PROGRESS NOTE ADULT - PROBLEM SELECTOR PROBLEM 7
CAD (coronary artery disease)
CAD (coronary artery disease)
Prophylactic measure

## 2022-10-07 NOTE — PROGRESS NOTE ADULT - SUBJECTIVE AND OBJECTIVE BOX
NEPHROLOGY PROGRESS NOTE    CHIEF COMPLAINT:  NED/CKD    HPI:  BP spiked again at renal biopsy.  She wants to go home.  Renal function has been stable and BP control has been acceptable while on the floor.    ROS:  denies SOB    EXAM:  T(F): 98 (10-07-22 @ 10:00)  HR: 73 (10-07-22 @ 10:00)  BP: 124/72 (10-07-22 @ 10:00)  RR: 18 (10-07-22 @ 10:00)  SpO2: 97% (10-07-22 @ 10:00)    Conversant, in no apparent distress  Normal respiratory effort, lungs clear bilaterally  Heart RRR with no murmur, no peripheral edema         LABS                             9.8    7.57  )-----------( 313      ( 07 Oct 2022 05:27 )             30.2          10-07    128<L>  |  96  |  61<H>  ----------------------------<  89  3.9   |  19<L>  |  2.07<H>    Ca    8.9      07 Oct 2022 05:28  Phos  3.9     10-07  Mg     1.8     10-07    TPro  7.2  /  Alb  3.5  /  TBili  0.3  /  DBili  x   /  AST  15  /  ALT  21  /  AlkPhos  44  10-06           Impression/Plan:  CM, mod AR, MR  Hx SLE, MCTD, concern for Scleroderma w/Raynaud  NED in setting of CM, valvular heart disease, CTD (Cr 1.19 - 6/4/22, Cr 0.94 - 10/25/21)  Started on ACE with good overall BP control but spikes around time of procedure/situational HTN on multiple attempts  As renal function currently on improving trend I have no objection to dc home with close outpatient renal follow up

## 2022-10-08 RX ORDER — CAPTOPRIL 12.5 MG/1
3 TABLET ORAL
Qty: 270 | Refills: 0
Start: 2022-10-08 | End: 2022-11-06

## 2022-10-11 LAB — INTERPRETATION SERPL IFE-IMP: SIGNIFICANT CHANGE UP

## 2022-10-18 LAB
ANTI PM-SCL-100 PLUS: <20 UNITS — SIGNIFICANT CHANGE UP
ANTI-SAE 1 IGG: <20 UNITS — SIGNIFICANT CHANGE UP
ANTI-SS-A 52 KD AB, IGG PLUS: 55 UNITS — HIGH
ANTI-U1-RNP AB PLUS: 106 UNITS — HIGH
EJ MYOMARKER3 PLUS: NEGATIVE — SIGNIFICANT CHANGE UP
ENA JO1 AB SER IA-ACNC: <20 UNITS — SIGNIFICANT CHANGE UP
FIBRILLARIN (U3 RNP) PLUS: NEGATIVE — SIGNIFICANT CHANGE UP
KU MYOMARKER3 PLUS: NEGATIVE — SIGNIFICANT CHANGE UP
MDA5 (P140)(CADM-140) PLUS: <20 UNITS — SIGNIFICANT CHANGE UP
MI-2 PLUS: NEGATIVE — SIGNIFICANT CHANGE UP
NXP-2 (P140) MYOPLUS: <20 UNITS — SIGNIFICANT CHANGE UP
OJ MYOMARKER3 PLUS: ABNORMAL
PL-12 PLUS: NEGATIVE — SIGNIFICANT CHANGE UP
PL-7 PLUS: NEGATIVE — SIGNIFICANT CHANGE UP
SRP MYOMARKER3 PLUS: NEGATIVE — SIGNIFICANT CHANGE UP
TIF GAMMA (P155/140) PLUS: <20 UNITS — SIGNIFICANT CHANGE UP
U2 SNRNP PLUS: ABNORMAL

## 2022-10-20 PROCEDURE — 80053 COMPREHEN METABOLIC PANEL: CPT

## 2022-10-20 PROCEDURE — 86146 BETA-2 GLYCOPROTEIN ANTIBODY: CPT

## 2022-10-20 PROCEDURE — 80048 BASIC METABOLIC PNL TOTAL CA: CPT

## 2022-10-20 PROCEDURE — 85027 COMPLETE CBC AUTOMATED: CPT

## 2022-10-20 PROCEDURE — 84480 ASSAY TRIIODOTHYRONINE (T3): CPT

## 2022-10-20 PROCEDURE — 83010 ASSAY OF HAPTOGLOBIN QUANT: CPT

## 2022-10-20 PROCEDURE — 84550 ASSAY OF BLOOD/URIC ACID: CPT

## 2022-10-20 PROCEDURE — 82330 ASSAY OF CALCIUM: CPT

## 2022-10-20 PROCEDURE — 74176 CT ABD & PELVIS W/O CONTRAST: CPT | Mod: MA

## 2022-10-20 PROCEDURE — 84100 ASSAY OF PHOSPHORUS: CPT

## 2022-10-20 PROCEDURE — 84244 ASSAY OF RENIN: CPT

## 2022-10-20 PROCEDURE — 83970 ASSAY OF PARATHORMONE: CPT

## 2022-10-20 PROCEDURE — 85652 RBC SED RATE AUTOMATED: CPT

## 2022-10-20 PROCEDURE — U0005: CPT

## 2022-10-20 PROCEDURE — 85610 PROTHROMBIN TIME: CPT

## 2022-10-20 PROCEDURE — 86160 COMPLEMENT ANTIGEN: CPT

## 2022-10-20 PROCEDURE — 86255 FLUORESCENT ANTIBODY SCREEN: CPT

## 2022-10-20 PROCEDURE — 86431 RHEUMATOID FACTOR QUANT: CPT

## 2022-10-20 PROCEDURE — 82088 ASSAY OF ALDOSTERONE: CPT

## 2022-10-20 PROCEDURE — 94660 CPAP INITIATION&MGMT: CPT

## 2022-10-20 PROCEDURE — 71045 X-RAY EXAM CHEST 1 VIEW: CPT

## 2022-10-20 PROCEDURE — 97161 PT EVAL LOW COMPLEX 20 MIN: CPT

## 2022-10-20 PROCEDURE — 86235 NUCLEAR ANTIGEN ANTIBODY: CPT

## 2022-10-20 PROCEDURE — 86850 RBC ANTIBODY SCREEN: CPT

## 2022-10-20 PROCEDURE — 82310 ASSAY OF CALCIUM: CPT

## 2022-10-20 PROCEDURE — 83880 ASSAY OF NATRIURETIC PEPTIDE: CPT

## 2022-10-20 PROCEDURE — 86900 BLOOD TYPING SEROLOGIC ABO: CPT

## 2022-10-20 PROCEDURE — 86901 BLOOD TYPING SEROLOGIC RH(D): CPT

## 2022-10-20 PROCEDURE — 85025 COMPLETE CBC W/AUTO DIFF WBC: CPT

## 2022-10-20 PROCEDURE — 87340 HEPATITIS B SURFACE AG IA: CPT

## 2022-10-20 PROCEDURE — 82436 ASSAY OF URINE CHLORIDE: CPT

## 2022-10-20 PROCEDURE — 84439 ASSAY OF FREE THYROXINE: CPT

## 2022-10-20 PROCEDURE — 82728 ASSAY OF FERRITIN: CPT

## 2022-10-20 PROCEDURE — 86706 HEP B SURFACE ANTIBODY: CPT

## 2022-10-20 PROCEDURE — 85730 THROMBOPLASTIN TIME PARTIAL: CPT

## 2022-10-20 PROCEDURE — 83605 ASSAY OF LACTIC ACID: CPT

## 2022-10-20 PROCEDURE — 84156 ASSAY OF PROTEIN URINE: CPT

## 2022-10-20 PROCEDURE — 82784 ASSAY IGA/IGD/IGG/IGM EACH: CPT

## 2022-10-20 PROCEDURE — 86705 HEP B CORE ANTIBODY IGM: CPT

## 2022-10-20 PROCEDURE — 87040 BLOOD CULTURE FOR BACTERIA: CPT

## 2022-10-20 PROCEDURE — 86036 ANCA SCREEN EACH ANTIBODY: CPT

## 2022-10-20 PROCEDURE — 83550 IRON BINDING TEST: CPT

## 2022-10-20 PROCEDURE — 36415 COLL VENOUS BLD VENIPUNCTURE: CPT

## 2022-10-20 PROCEDURE — 84436 ASSAY OF TOTAL THYROXINE: CPT

## 2022-10-20 PROCEDURE — 86325 OTHER IMMUNOELECTROPHORESIS: CPT

## 2022-10-20 PROCEDURE — 76775 US EXAM ABDO BACK WALL LIM: CPT

## 2022-10-20 PROCEDURE — 93306 TTE W/DOPPLER COMPLETE: CPT

## 2022-10-20 PROCEDURE — 83516 IMMUNOASSAY NONANTIBODY: CPT

## 2022-10-20 PROCEDURE — 94640 AIRWAY INHALATION TREATMENT: CPT

## 2022-10-20 PROCEDURE — 81001 URINALYSIS AUTO W/SCOPE: CPT

## 2022-10-20 PROCEDURE — 94760 N-INVAS EAR/PLS OXIMETRY 1: CPT

## 2022-10-20 PROCEDURE — 84484 ASSAY OF TROPONIN QUANT: CPT

## 2022-10-20 PROCEDURE — 85379 FIBRIN DEGRADATION QUANT: CPT

## 2022-10-20 PROCEDURE — 83735 ASSAY OF MAGNESIUM: CPT

## 2022-10-20 PROCEDURE — 93005 ELECTROCARDIOGRAM TRACING: CPT

## 2022-10-20 PROCEDURE — 82570 ASSAY OF URINE CREATININE: CPT

## 2022-10-20 PROCEDURE — 86147 CARDIOLIPIN ANTIBODY EA IG: CPT

## 2022-10-20 PROCEDURE — 93975 VASCULAR STUDY: CPT

## 2022-10-20 PROCEDURE — 84155 ASSAY OF PROTEIN SERUM: CPT

## 2022-10-20 PROCEDURE — 84443 ASSAY THYROID STIM HORMONE: CPT

## 2022-10-20 PROCEDURE — 82550 ASSAY OF CK (CPK): CPT

## 2022-10-20 PROCEDURE — 86334 IMMUNOFIX E-PHORESIS SERUM: CPT

## 2022-10-20 PROCEDURE — 86803 HEPATITIS C AB TEST: CPT

## 2022-10-20 PROCEDURE — 86038 ANTINUCLEAR ANTIBODIES: CPT

## 2022-10-20 PROCEDURE — 83540 ASSAY OF IRON: CPT

## 2022-10-20 PROCEDURE — 86704 HEP B CORE ANTIBODY TOTAL: CPT

## 2022-10-20 PROCEDURE — 86140 C-REACTIVE PROTEIN: CPT

## 2022-10-20 PROCEDURE — 0225U NFCT DS DNA&RNA 21 SARSCOV2: CPT

## 2022-10-20 PROCEDURE — 87635 SARS-COV-2 COVID-19 AMP PRB: CPT

## 2022-10-20 PROCEDURE — 83615 LACTATE (LD) (LDH) ENZYME: CPT

## 2022-10-20 PROCEDURE — 84166 PROTEIN E-PHORESIS/URINE/CSF: CPT

## 2022-10-20 PROCEDURE — U0003: CPT

## 2022-10-20 PROCEDURE — 86480 TB TEST CELL IMMUN MEASURE: CPT

## 2022-10-20 PROCEDURE — 82962 GLUCOSE BLOOD TEST: CPT

## 2022-10-20 PROCEDURE — 71250 CT THORAX DX C-: CPT | Mod: MA

## 2022-10-20 PROCEDURE — 83521 IG LIGHT CHAINS FREE EACH: CPT

## 2022-10-20 PROCEDURE — 86225 DNA ANTIBODY NATIVE: CPT

## 2022-10-20 PROCEDURE — 84165 PROTEIN E-PHORESIS SERUM: CPT

## 2023-01-01 ENCOUNTER — TRANSCRIPTION ENCOUNTER (OUTPATIENT)
Age: 73
End: 2023-01-01

## 2023-01-01 ENCOUNTER — EMERGENCY (EMERGENCY)
Facility: HOSPITAL | Age: 73
LOS: 1 days | End: 2023-01-01
Attending: EMERGENCY MEDICINE | Admitting: EMERGENCY MEDICINE
Payer: MEDICARE

## 2023-01-01 ENCOUNTER — APPOINTMENT (OUTPATIENT)
Dept: ORTHOPEDIC SURGERY | Facility: CLINIC | Age: 73
End: 2023-01-01
Payer: MEDICARE

## 2023-01-01 ENCOUNTER — EMERGENCY (EMERGENCY)
Facility: HOSPITAL | Age: 73
LOS: 1 days | Discharge: ANOTHER HOSPITAL | End: 2023-01-01
Attending: STUDENT IN AN ORGANIZED HEALTH CARE EDUCATION/TRAINING PROGRAM | Admitting: STUDENT IN AN ORGANIZED HEALTH CARE EDUCATION/TRAINING PROGRAM
Payer: MEDICARE

## 2023-01-01 ENCOUNTER — NON-APPOINTMENT (OUTPATIENT)
Age: 73
End: 2023-01-01

## 2023-01-01 ENCOUNTER — INPATIENT (INPATIENT)
Facility: HOSPITAL | Age: 73
LOS: 0 days | Discharge: ANOTHER HOSPITAL | DRG: 291 | End: 2023-06-21
Attending: FAMILY MEDICINE | Admitting: STUDENT IN AN ORGANIZED HEALTH CARE EDUCATION/TRAINING PROGRAM
Payer: COMMERCIAL

## 2023-01-01 VITALS
RESPIRATION RATE: 20 BRPM | TEMPERATURE: 98 F | OXYGEN SATURATION: 100 % | SYSTOLIC BLOOD PRESSURE: 150 MMHG | HEART RATE: 104 BPM | DIASTOLIC BLOOD PRESSURE: 85 MMHG

## 2023-01-01 VITALS
SYSTOLIC BLOOD PRESSURE: 164 MMHG | RESPIRATION RATE: 26 BRPM | DIASTOLIC BLOOD PRESSURE: 89 MMHG | HEART RATE: 114 BPM | WEIGHT: 134.92 LBS | TEMPERATURE: 98 F | HEIGHT: 62 IN | OXYGEN SATURATION: 100 %

## 2023-01-01 VITALS — WEIGHT: 145 LBS | HEIGHT: 63 IN | BODY MASS INDEX: 25.69 KG/M2

## 2023-01-01 VITALS
WEIGHT: 130.07 LBS | SYSTOLIC BLOOD PRESSURE: 149 MMHG | HEIGHT: 62 IN | RESPIRATION RATE: 18 BRPM | HEART RATE: 84 BPM | DIASTOLIC BLOOD PRESSURE: 92 MMHG | TEMPERATURE: 97 F | OXYGEN SATURATION: 94 %

## 2023-01-01 VITALS — HEART RATE: 90 BPM | DIASTOLIC BLOOD PRESSURE: 83 MMHG | SYSTOLIC BLOOD PRESSURE: 139 MMHG

## 2023-01-01 DIAGNOSIS — F17.210 NICOTINE DEPENDENCE, CIGARETTES, UNCOMPLICATED: ICD-10-CM

## 2023-01-01 DIAGNOSIS — N18.6 END STAGE RENAL DISEASE: ICD-10-CM

## 2023-01-01 DIAGNOSIS — I10 ESSENTIAL (PRIMARY) HYPERTENSION: ICD-10-CM

## 2023-01-01 DIAGNOSIS — E03.9 HYPOTHYROIDISM, UNSPECIFIED: ICD-10-CM

## 2023-01-01 DIAGNOSIS — Z71.89 OTHER SPECIFIED COUNSELING: ICD-10-CM

## 2023-01-01 DIAGNOSIS — R77.8 OTHER SPECIFIED ABNORMALITIES OF PLASMA PROTEINS: ICD-10-CM

## 2023-01-01 DIAGNOSIS — I50.9 HEART FAILURE, UNSPECIFIED: ICD-10-CM

## 2023-01-01 DIAGNOSIS — Z95.2 PRESENCE OF PROSTHETIC HEART VALVE: Chronic | ICD-10-CM

## 2023-01-01 DIAGNOSIS — Z98.890 OTHER SPECIFIED POSTPROCEDURAL STATES: Chronic | ICD-10-CM

## 2023-01-01 DIAGNOSIS — M87.9 OSTEONECROSIS, UNSPECIFIED: ICD-10-CM

## 2023-01-01 DIAGNOSIS — I35.0 NONRHEUMATIC AORTIC (VALVE) STENOSIS: ICD-10-CM

## 2023-01-01 DIAGNOSIS — Z29.9 ENCOUNTER FOR PROPHYLACTIC MEASURES, UNSPECIFIED: ICD-10-CM

## 2023-01-01 DIAGNOSIS — Z93.1 GASTROSTOMY STATUS: Chronic | ICD-10-CM

## 2023-01-01 DIAGNOSIS — E78.5 HYPERLIPIDEMIA, UNSPECIFIED: ICD-10-CM

## 2023-01-01 DIAGNOSIS — I50.23 ACUTE ON CHRONIC SYSTOLIC (CONGESTIVE) HEART FAILURE: ICD-10-CM

## 2023-01-01 LAB
ALBUMIN SERPL ELPH-MCNC: 3.6 G/DL — SIGNIFICANT CHANGE UP (ref 3.3–5)
ALBUMIN SERPL ELPH-MCNC: 3.6 G/DL — SIGNIFICANT CHANGE UP (ref 3.3–5)
ALP SERPL-CCNC: 78 U/L — SIGNIFICANT CHANGE UP (ref 40–120)
ALP SERPL-CCNC: 87 U/L — SIGNIFICANT CHANGE UP (ref 30–120)
ALT FLD-CCNC: 20 U/L DA — SIGNIFICANT CHANGE UP (ref 10–60)
ALT FLD-CCNC: 29 U/L — SIGNIFICANT CHANGE UP (ref 12–78)
ANION GAP SERPL CALC-SCNC: 14 MMOL/L — SIGNIFICANT CHANGE UP (ref 5–17)
ANION GAP SERPL CALC-SCNC: 16 MMOL/L — SIGNIFICANT CHANGE UP (ref 5–17)
APTT BLD: 29.8 SEC — SIGNIFICANT CHANGE UP (ref 27.5–35.5)
AST SERPL-CCNC: 39 U/L — HIGH (ref 15–37)
AST SERPL-CCNC: 40 U/L — SIGNIFICANT CHANGE UP (ref 10–40)
BASE EXCESS BLDA CALC-SCNC: -4.3 MMOL/L — LOW (ref -2–3)
BASE EXCESS BLDA CALC-SCNC: -8.5 MMOL/L — LOW (ref -2–3)
BASOPHILS # BLD AUTO: 0 K/UL — SIGNIFICANT CHANGE UP (ref 0–0.2)
BASOPHILS # BLD AUTO: 0.01 K/UL — SIGNIFICANT CHANGE UP (ref 0–0.2)
BASOPHILS NFR BLD AUTO: 0 % — SIGNIFICANT CHANGE UP (ref 0–2)
BASOPHILS NFR BLD AUTO: 0.2 % — SIGNIFICANT CHANGE UP (ref 0–2)
BILIRUB SERPL-MCNC: 0.5 MG/DL — SIGNIFICANT CHANGE UP (ref 0.2–1.2)
BILIRUB SERPL-MCNC: 0.6 MG/DL — SIGNIFICANT CHANGE UP (ref 0.2–1.2)
BLOOD GAS COMMENTS ARTERIAL: SIGNIFICANT CHANGE UP
BUN SERPL-MCNC: 42 MG/DL — HIGH (ref 7–23)
BUN SERPL-MCNC: 48 MG/DL — HIGH (ref 7–23)
CALCIUM SERPL-MCNC: 8.9 MG/DL — SIGNIFICANT CHANGE UP (ref 8.5–10.1)
CALCIUM SERPL-MCNC: 9 MG/DL — SIGNIFICANT CHANGE UP (ref 8.4–10.5)
CHLORIDE SERPL-SCNC: 93 MMOL/L — LOW (ref 96–108)
CHLORIDE SERPL-SCNC: 93 MMOL/L — LOW (ref 96–108)
CK MB BLD-MCNC: 2.4 % — SIGNIFICANT CHANGE UP (ref 0–3.5)
CK MB BLD-MCNC: 3.3 % — SIGNIFICANT CHANGE UP (ref 0–3.5)
CK MB CFR SERPL CALC: 1.6 NG/ML — SIGNIFICANT CHANGE UP (ref 0–3.6)
CK MB CFR SERPL CALC: 2.4 NG/ML — SIGNIFICANT CHANGE UP (ref 0–3.6)
CK SERPL-CCNC: 68 U/L — SIGNIFICANT CHANGE UP (ref 26–192)
CK SERPL-CCNC: 73 U/L — SIGNIFICANT CHANGE UP (ref 26–192)
CO2 SERPL-SCNC: 20 MMOL/L — LOW (ref 22–31)
CO2 SERPL-SCNC: 21 MMOL/L — LOW (ref 22–31)
CREAT SERPL-MCNC: 3.96 MG/DL — HIGH (ref 0.5–1.3)
CREAT SERPL-MCNC: 4.3 MG/DL — HIGH (ref 0.5–1.3)
EGFR: 10 ML/MIN/1.73M2 — LOW
EGFR: 11 ML/MIN/1.73M2 — LOW
EOSINOPHIL # BLD AUTO: 0 K/UL — SIGNIFICANT CHANGE UP (ref 0–0.5)
EOSINOPHIL # BLD AUTO: 0.01 K/UL — SIGNIFICANT CHANGE UP (ref 0–0.5)
EOSINOPHIL NFR BLD AUTO: 0 % — SIGNIFICANT CHANGE UP (ref 0–6)
EOSINOPHIL NFR BLD AUTO: 0.2 % — SIGNIFICANT CHANGE UP (ref 0–6)
GAS PNL BLDA: SIGNIFICANT CHANGE UP
GLUCOSE SERPL-MCNC: 140 MG/DL — HIGH (ref 70–99)
GLUCOSE SERPL-MCNC: 148 MG/DL — HIGH (ref 70–99)
HAV IGM SER-ACNC: SIGNIFICANT CHANGE UP
HBV CORE IGM SER-ACNC: SIGNIFICANT CHANGE UP
HBV SURFACE AG SER-ACNC: SIGNIFICANT CHANGE UP
HCO3 BLDA-SCNC: 17 MMOL/L — LOW (ref 21–28)
HCO3 BLDA-SCNC: 20 MMOL/L — LOW (ref 21–28)
HCT VFR BLD CALC: 33.9 % — LOW (ref 34.5–45)
HCT VFR BLD CALC: 34.5 % — SIGNIFICANT CHANGE UP (ref 34.5–45)
HCV AB S/CO SERPL IA: 0.1 S/CO — SIGNIFICANT CHANGE UP (ref 0–0.99)
HCV AB SERPL-IMP: SIGNIFICANT CHANGE UP
HGB BLD-MCNC: 10.5 G/DL — LOW (ref 11.5–15.5)
HGB BLD-MCNC: 10.7 G/DL — LOW (ref 11.5–15.5)
HOROWITZ INDEX BLDA+IHG-RTO: 30 — SIGNIFICANT CHANGE UP
IMM GRANULOCYTES NFR BLD AUTO: 0.3 % — SIGNIFICANT CHANGE UP (ref 0–0.9)
IMM GRANULOCYTES NFR BLD AUTO: 0.7 % — SIGNIFICANT CHANGE UP (ref 0–0.9)
INR BLD: 1.23 RATIO — HIGH (ref 0.88–1.16)
LYMPHOCYTES # BLD AUTO: 0.28 K/UL — LOW (ref 1–3.3)
LYMPHOCYTES # BLD AUTO: 0.7 K/UL — LOW (ref 1–3.3)
LYMPHOCYTES # BLD AUTO: 11.4 % — LOW (ref 13–44)
LYMPHOCYTES # BLD AUTO: 6.4 % — LOW (ref 13–44)
MAGNESIUM SERPL-MCNC: 2 MG/DL — SIGNIFICANT CHANGE UP (ref 1.6–2.6)
MCHC RBC-ENTMCNC: 30.9 PG — SIGNIFICANT CHANGE UP (ref 27–34)
MCHC RBC-ENTMCNC: 31 GM/DL — LOW (ref 32–36)
MCHC RBC-ENTMCNC: 31 GM/DL — LOW (ref 32–36)
MCHC RBC-ENTMCNC: 31.2 PG — SIGNIFICANT CHANGE UP (ref 27–34)
MCV RBC AUTO: 100.6 FL — HIGH (ref 80–100)
MCV RBC AUTO: 99.7 FL — SIGNIFICANT CHANGE UP (ref 80–100)
MONOCYTES # BLD AUTO: 0.39 K/UL — SIGNIFICANT CHANGE UP (ref 0–0.9)
MONOCYTES # BLD AUTO: 0.73 K/UL — SIGNIFICANT CHANGE UP (ref 0–0.9)
MONOCYTES NFR BLD AUTO: 11.9 % — SIGNIFICANT CHANGE UP (ref 2–14)
MONOCYTES NFR BLD AUTO: 8.9 % — SIGNIFICANT CHANGE UP (ref 2–14)
NEUTROPHILS # BLD AUTO: 3.69 K/UL — SIGNIFICANT CHANGE UP (ref 1.8–7.4)
NEUTROPHILS # BLD AUTO: 4.68 K/UL — SIGNIFICANT CHANGE UP (ref 1.8–7.4)
NEUTROPHILS NFR BLD AUTO: 76 % — SIGNIFICANT CHANGE UP (ref 43–77)
NEUTROPHILS NFR BLD AUTO: 84 % — HIGH (ref 43–77)
NRBC # BLD: 0 /100 WBCS — SIGNIFICANT CHANGE UP (ref 0–0)
NRBC # BLD: 0 /100 WBCS — SIGNIFICANT CHANGE UP (ref 0–0)
NT-PROBNP SERPL-SCNC: HIGH PG/ML (ref 0–125)
PCO2 BLDA: 29 MMHG — LOW (ref 32–35)
PCO2 BLDA: 32 MMHG — SIGNIFICANT CHANGE UP (ref 32–35)
PH BLDA: 7.37 — SIGNIFICANT CHANGE UP (ref 7.35–7.45)
PH BLDA: 7.41 — SIGNIFICANT CHANGE UP (ref 7.35–7.45)
PHOSPHATE SERPL-MCNC: 6.9 MG/DL — HIGH (ref 2.5–4.5)
PLATELET # BLD AUTO: 127 K/UL — LOW (ref 150–400)
PLATELET # BLD AUTO: 142 K/UL — LOW (ref 150–400)
PO2 BLDA: 109 MMHG — HIGH (ref 83–108)
PO2 BLDA: 159 MMHG — HIGH (ref 83–108)
POTASSIUM SERPL-MCNC: 5.1 MMOL/L — SIGNIFICANT CHANGE UP (ref 3.5–5.3)
POTASSIUM SERPL-MCNC: 5.3 MMOL/L — SIGNIFICANT CHANGE UP (ref 3.5–5.3)
POTASSIUM SERPL-SCNC: 5.1 MMOL/L — SIGNIFICANT CHANGE UP (ref 3.5–5.3)
POTASSIUM SERPL-SCNC: 5.3 MMOL/L — SIGNIFICANT CHANGE UP (ref 3.5–5.3)
PROT SERPL-MCNC: 7.7 G/DL — SIGNIFICANT CHANGE UP (ref 6–8.3)
PROT SERPL-MCNC: 7.8 G/DL — SIGNIFICANT CHANGE UP (ref 6–8.3)
PROTHROM AB SERPL-ACNC: 14.5 SEC — HIGH (ref 10.5–13.4)
RAPID RVP RESULT: SIGNIFICANT CHANGE UP
RBC # BLD: 3.4 M/UL — LOW (ref 3.8–5.2)
RBC # BLD: 3.43 M/UL — LOW (ref 3.8–5.2)
RBC # FLD: 16 % — HIGH (ref 10.3–14.5)
RBC # FLD: 16.1 % — HIGH (ref 10.3–14.5)
SAO2 % BLDA: 100 % — HIGH (ref 94–98)
SAO2 % BLDA: 99 % — HIGH (ref 94–98)
SARS-COV-2 RNA SPEC QL NAA+PROBE: SIGNIFICANT CHANGE UP
SODIUM SERPL-SCNC: 128 MMOL/L — LOW (ref 135–145)
SODIUM SERPL-SCNC: 129 MMOL/L — LOW (ref 135–145)
TROPONIN I, HIGH SENSITIVITY RESULT: 189.2 NG/L — HIGH
TROPONIN I, HIGH SENSITIVITY RESULT: 275.8 NG/L — HIGH
WBC # BLD: 4.39 K/UL — SIGNIFICANT CHANGE UP (ref 3.8–10.5)
WBC # BLD: 6.15 K/UL — SIGNIFICANT CHANGE UP (ref 3.8–10.5)
WBC # FLD AUTO: 4.39 K/UL — SIGNIFICANT CHANGE UP (ref 3.8–10.5)
WBC # FLD AUTO: 6.15 K/UL — SIGNIFICANT CHANGE UP (ref 3.8–10.5)

## 2023-01-01 PROCEDURE — 94660 CPAP INITIATION&MGMT: CPT

## 2023-01-01 PROCEDURE — 0225U NFCT DS DNA&RNA 21 SARSCOV2: CPT

## 2023-01-01 PROCEDURE — 93010 ELECTROCARDIOGRAM REPORT: CPT

## 2023-01-01 PROCEDURE — 74018 RADEX ABDOMEN 1 VIEW: CPT

## 2023-01-01 PROCEDURE — 80074 ACUTE HEPATITIS PANEL: CPT

## 2023-01-01 PROCEDURE — 36415 COLL VENOUS BLD VENIPUNCTURE: CPT

## 2023-01-01 PROCEDURE — 82803 BLOOD GASES ANY COMBINATION: CPT

## 2023-01-01 PROCEDURE — 99285 EMERGENCY DEPT VISIT HI MDM: CPT | Mod: 25

## 2023-01-01 PROCEDURE — 99291 CRITICAL CARE FIRST HOUR: CPT

## 2023-01-01 PROCEDURE — 74018 RADEX ABDOMEN 1 VIEW: CPT | Mod: 26

## 2023-01-01 PROCEDURE — 83880 ASSAY OF NATRIURETIC PEPTIDE: CPT

## 2023-01-01 PROCEDURE — 99285 EMERGENCY DEPT VISIT HI MDM: CPT

## 2023-01-01 PROCEDURE — 85730 THROMBOPLASTIN TIME PARTIAL: CPT

## 2023-01-01 PROCEDURE — 96374 THER/PROPH/DIAG INJ IV PUSH: CPT

## 2023-01-01 PROCEDURE — 82550 ASSAY OF CK (CPK): CPT

## 2023-01-01 PROCEDURE — 72170 X-RAY EXAM OF PELVIS: CPT

## 2023-01-01 PROCEDURE — 84484 ASSAY OF TROPONIN QUANT: CPT

## 2023-01-01 PROCEDURE — 84100 ASSAY OF PHOSPHORUS: CPT

## 2023-01-01 PROCEDURE — 85025 COMPLETE CBC W/AUTO DIFF WBC: CPT

## 2023-01-01 PROCEDURE — 99204 OFFICE O/P NEW MOD 45 MIN: CPT

## 2023-01-01 PROCEDURE — 72100 X-RAY EXAM L-S SPINE 2/3 VWS: CPT

## 2023-01-01 PROCEDURE — 82553 CREATINE MB FRACTION: CPT

## 2023-01-01 PROCEDURE — 94799 UNLISTED PULMONARY SVC/PX: CPT

## 2023-01-01 PROCEDURE — 99223 1ST HOSP IP/OBS HIGH 75: CPT

## 2023-01-01 PROCEDURE — 83735 ASSAY OF MAGNESIUM: CPT

## 2023-01-01 PROCEDURE — 99497 ADVNCD CARE PLAN 30 MIN: CPT | Mod: 25,GC

## 2023-01-01 PROCEDURE — 99223 1ST HOSP IP/OBS HIGH 75: CPT | Mod: GC

## 2023-01-01 PROCEDURE — 99239 HOSP IP/OBS DSCHRG MGMT >30: CPT

## 2023-01-01 PROCEDURE — 71045 X-RAY EXAM CHEST 1 VIEW: CPT

## 2023-01-01 PROCEDURE — 36600 WITHDRAWAL OF ARTERIAL BLOOD: CPT

## 2023-01-01 PROCEDURE — 85610 PROTHROMBIN TIME: CPT

## 2023-01-01 PROCEDURE — 93005 ELECTROCARDIOGRAM TRACING: CPT

## 2023-01-01 PROCEDURE — 80053 COMPREHEN METABOLIC PANEL: CPT

## 2023-01-01 PROCEDURE — 99261: CPT

## 2023-01-01 PROCEDURE — 92950 HEART/LUNG RESUSCITATION CPR: CPT

## 2023-01-01 PROCEDURE — 71045 X-RAY EXAM CHEST 1 VIEW: CPT | Mod: 26

## 2023-01-01 RX ORDER — ROSUVASTATIN CALCIUM 5 MG/1
1 TABLET ORAL
Refills: 0 | DISCHARGE

## 2023-01-01 RX ORDER — FUROSEMIDE 40 MG
60 TABLET ORAL ONCE
Refills: 0 | Status: COMPLETED | OUTPATIENT
Start: 2023-01-01 | End: 2023-01-01

## 2023-01-01 RX ORDER — ATORVASTATIN CALCIUM 80 MG/1
1 TABLET, FILM COATED ORAL
Qty: 0 | Refills: 0 | DISCHARGE
Start: 2023-01-01

## 2023-01-01 RX ORDER — LEVOTHYROXINE SODIUM 125 MCG
1 TABLET ORAL
Refills: 0 | DISCHARGE

## 2023-01-01 RX ORDER — SEVELAMER CARBONATE 2400 MG/1
1 POWDER, FOR SUSPENSION ORAL
Refills: 0 | DISCHARGE

## 2023-01-01 RX ORDER — ONDANSETRON 8 MG/1
4 TABLET, FILM COATED ORAL EVERY 8 HOURS
Refills: 0 | Status: DISCONTINUED | OUTPATIENT
Start: 2023-01-01 | End: 2023-01-01

## 2023-01-01 RX ORDER — PANTOPRAZOLE SODIUM 20 MG/1
1 TABLET, DELAYED RELEASE ORAL
Qty: 0 | Refills: 0 | DISCHARGE
Start: 2023-01-01

## 2023-01-01 RX ORDER — VERAPAMIL HCL 240 MG
1 CAPSULE, EXTENDED RELEASE PELLETS 24 HR ORAL
Refills: 0 | DISCHARGE

## 2023-01-01 RX ORDER — VERAPAMIL HCL 240 MG
180 CAPSULE, EXTENDED RELEASE PELLETS 24 HR ORAL DAILY
Refills: 0 | Status: DISCONTINUED | OUTPATIENT
Start: 2023-01-01 | End: 2023-01-01

## 2023-01-01 RX ORDER — MIRTAZAPINE 45 MG/1
1 TABLET, ORALLY DISINTEGRATING ORAL
Refills: 0 | DISCHARGE

## 2023-01-01 RX ORDER — ATORVASTATIN CALCIUM 80 MG/1
20 TABLET, FILM COATED ORAL AT BEDTIME
Refills: 0 | Status: DISCONTINUED | OUTPATIENT
Start: 2023-01-01 | End: 2023-01-01

## 2023-01-01 RX ORDER — DOCUSATE SODIUM 100 MG
1 CAPSULE ORAL
Refills: 0 | DISCHARGE

## 2023-01-01 RX ORDER — OMEPRAZOLE 10 MG/1
1 CAPSULE, DELAYED RELEASE ORAL
Refills: 0 | DISCHARGE

## 2023-01-01 RX ORDER — ASPIRIN/CALCIUM CARB/MAGNESIUM 324 MG
1 TABLET ORAL
Refills: 0 | DISCHARGE

## 2023-01-01 RX ORDER — ASPIRIN/CALCIUM CARB/MAGNESIUM 324 MG
1 TABLET ORAL
Qty: 0 | Refills: 0 | DISCHARGE
Start: 2023-01-01

## 2023-01-01 RX ORDER — SODIUM CHLORIDE 9 MG/ML
100 INJECTION INTRAMUSCULAR; INTRAVENOUS; SUBCUTANEOUS
Refills: 0 | Status: DISCONTINUED | OUTPATIENT
Start: 2023-01-01 | End: 2023-01-01

## 2023-01-01 RX ORDER — ASPIRIN/CALCIUM CARB/MAGNESIUM 324 MG
81 TABLET ORAL DAILY
Refills: 0 | Status: DISCONTINUED | OUTPATIENT
Start: 2023-01-01 | End: 2023-01-01

## 2023-01-01 RX ORDER — FUROSEMIDE 40 MG
40 TABLET ORAL ONCE
Refills: 0 | Status: COMPLETED | OUTPATIENT
Start: 2023-01-01 | End: 2023-01-01

## 2023-01-01 RX ORDER — LANSOPRAZOLE 15 MG/1
1 CAPSULE, DELAYED RELEASE ORAL
Qty: 0 | Refills: 0 | DISCHARGE

## 2023-01-01 RX ORDER — VERAPAMIL HCL 240 MG
1 CAPSULE, EXTENDED RELEASE PELLETS 24 HR ORAL
Qty: 0 | Refills: 0 | DISCHARGE
Start: 2023-01-01

## 2023-01-01 RX ORDER — HEPARIN SODIUM 5000 [USP'U]/ML
5000 INJECTION INTRAVENOUS; SUBCUTANEOUS EVERY 8 HOURS
Refills: 0 | Status: DISCONTINUED | OUTPATIENT
Start: 2023-01-01 | End: 2023-01-01

## 2023-01-01 RX ORDER — PANTOPRAZOLE SODIUM 20 MG/1
40 TABLET, DELAYED RELEASE ORAL
Refills: 0 | Status: DISCONTINUED | OUTPATIENT
Start: 2023-01-01 | End: 2023-01-01

## 2023-01-01 RX ORDER — LANOLIN ALCOHOL/MO/W.PET/CERES
3 CREAM (GRAM) TOPICAL AT BEDTIME
Refills: 0 | Status: DISCONTINUED | OUTPATIENT
Start: 2023-01-01 | End: 2023-01-01

## 2023-01-01 RX ORDER — LEVOTHYROXINE SODIUM 125 MCG
100 TABLET ORAL DAILY
Refills: 0 | Status: DISCONTINUED | OUTPATIENT
Start: 2023-01-01 | End: 2023-01-01

## 2023-01-01 RX ORDER — MIRTAZAPINE 45 MG/1
15 TABLET, ORALLY DISINTEGRATING ORAL AT BEDTIME
Refills: 0 | Status: DISCONTINUED | OUTPATIENT
Start: 2023-01-01 | End: 2023-01-01

## 2023-01-01 RX ORDER — MIRTAZAPINE 45 MG/1
1 TABLET, ORALLY DISINTEGRATING ORAL
Qty: 0 | Refills: 0 | DISCHARGE
Start: 2023-01-01

## 2023-01-01 RX ORDER — ACETAMINOPHEN 500 MG
650 TABLET ORAL EVERY 6 HOURS
Refills: 0 | Status: DISCONTINUED | OUTPATIENT
Start: 2023-01-01 | End: 2023-01-01

## 2023-01-01 RX ORDER — ASPIRIN/CALCIUM CARB/MAGNESIUM 324 MG
1 TABLET ORAL
Qty: 0 | Refills: 0 | DISCHARGE

## 2023-01-01 RX ORDER — ROSUVASTATIN CALCIUM 5 MG/1
1 TABLET ORAL
Qty: 0 | Refills: 0 | DISCHARGE

## 2023-01-01 RX ORDER — FUROSEMIDE 40 MG
40 TABLET ORAL
Refills: 0 | Status: DISCONTINUED | OUTPATIENT
Start: 2023-01-01 | End: 2023-01-01

## 2023-01-01 RX ADMIN — Medication 650 MILLIGRAM(S): at 12:38

## 2023-01-01 RX ADMIN — Medication 81 MILLIGRAM(S): at 12:32

## 2023-01-01 RX ADMIN — Medication 40 MILLIGRAM(S): at 06:47

## 2023-01-01 RX ADMIN — Medication 40 MILLIGRAM(S): at 19:25

## 2023-01-01 RX ADMIN — HEPARIN SODIUM 5000 UNIT(S): 5000 INJECTION INTRAVENOUS; SUBCUTANEOUS at 06:47

## 2023-01-01 RX ADMIN — Medication 40 MILLIGRAM(S): at 00:51

## 2023-01-01 RX ADMIN — Medication 60 MILLIGRAM(S): at 04:28

## 2023-01-01 RX ADMIN — HEPARIN SODIUM 5000 UNIT(S): 5000 INJECTION INTRAVENOUS; SUBCUTANEOUS at 19:25

## 2023-01-01 RX ADMIN — Medication 650 MILLIGRAM(S): at 13:05

## 2023-02-08 NOTE — PROGRESS NOTE ADULT - PROBLEM SELECTOR PROBLEM 6
none
Hypothyroidism
CAD (coronary artery disease)
Hypothyroidism

## 2023-05-31 NOTE — PATIENT PROFILE ADULT - DATE OF LAST VACCINATION
15-Nov-2021 Xolair Counseling:  Patient informed of potential adverse effects including but not limited to fever, muscle aches, rash and allergic reactions.  The patient verbalized understanding of the proper use and possible adverse effects of Xolair.  All of the patient's questions and concerns were addressed.

## 2023-06-20 NOTE — ED ADULT NURSE NOTE - PRIMARY CARE PROVIDER
Patient: Autumn Sandoval    Procedure Summary     Date:  06/19/18 Room / Location:      Anesthesia Start:  2300 Anesthesia Stop:  06/20/18 0222    Procedure:  LABOR ANALGESIA Diagnosis:      Scheduled Providers:   Provider:  Jhoan Cabrera CRNA    Anesthesia Type:  epidural ASA Status:  2          Anesthesia Type: epidural  Last vitals  BP   118/76 (06/20/18 1200)   Temp   97.7 °F (36.5 °C) (06/20/18 1200)   Pulse   73 (06/20/18 1200)   Resp   18 (06/20/18 1200)     SpO2   98 % (06/20/18 1200)     Post Anesthesia Care and Evaluation    Patient location during evaluation: PACU  Patient participation: complete - patient participated  Level of consciousness: awake and alert  Pain score: 0  Pain management: adequate  Airway patency: patent  Anesthetic complications: No anesthetic complications    Cardiovascular status: acceptable and stable  Respiratory status: acceptable and unassisted  Hydration status: acceptable  Post Neuraxial Block status: Motor and sensory function returned to baseline and No signs or symptoms of PDPH     PMD

## 2023-06-20 NOTE — ED ADULT NURSE NOTE - NSFALLRISKINTERV_ED_ALL_ED

## 2023-06-20 NOTE — ED ADULT TRIAGE NOTE - CHIEF COMPLAINT QUOTE
I cant breathe I cant breathe; unable to get O2 sat in triage; pt cyanotic; pt received on 3L O2 via NC; home O2 at baseline

## 2023-06-20 NOTE — ED ADULT NURSE NOTE - CHIEF COMPLAINT QUOTE
I cant breathe; unable to get O2 sat in triage; pt cyanotic; pt received on 3L O2 via NC; home O2 at baseline

## 2023-06-20 NOTE — ED ADULT NURSE NOTE - OBJECTIVE STATEMENT
Pt BIB daughter from home with difficulty breathing. per daughter at bedside, pt was told by her doctor that she was having too much fluid in her body and that she needs extra dialysis, so pt had 4 dialysis last week, and followed up with her cardiologist yesterday for that, was sent for a CXR. pt going downhill today, with increased difficulty breathing, weakness, decreased eating/drinking, pt goes dialysis mon/wed/fri. pt usually walks with a walker at home, but today was too weak to walk. pt former smoker, smoked for 55years. quit 2022

## 2023-06-20 NOTE — ED ADULT NURSE NOTE - FINAL NURSING ELECTRONIC SIGNATURE
21-Jun-2023 01:37 Elliptical Excision Additional Text (Leave Blank If You Do Not Want): The margin was drawn around the clinically apparent lesion.  An elliptical shape was then drawn on the skin incorporating the lesion and margins.  Incisions were then made along these lines to the appropriate tissue plane and the lesion was extirpated.

## 2023-06-21 NOTE — CONSULT NOTE ADULT - SUBJECTIVE AND OBJECTIVE BOX
[facility] -- Сергей Kemp, Sky, Nolan Dai, Kenn Rolon: Office # [phone]    Patient is a 72y old  Female who presents with a chief complaint of chf exac, dialysis (21 Jun 2023 03:24)      FROM ADMISSION H&P:  HPI:  73 y/o f pmhx ESRD on HD (MWF), HLD, HTN, CHF(EF 20%), aortic stenosis s/p TAVR, Scleroderma, presents with shortness of breath. Patient and family provide history at the bedside. Patient reportedly has been feeling significantly worse over the past two weeks with worse shortness of breath and weakness. She has been sleeping upright on a couch rather than laying down as she is too short of breath. She had an acute visit with cardiology Dr. Friend yesterday who repeated an Echo and found that her EF was still 20%, which it had been for some time. She was told to get a chest x ray but this was not performed. Patient was dialyzed yesterday with removal of 2.8L, but this was stopped early as the patient was too weak. She has home o2 at home which she normally only uses PRN but she has been using it constantly recently. She was last hospitalized in April with similar symptoms. Of note, she vomited a small amount x1 in the [facility] ED. She currently denies chest pain or palpitations, abdominal pain, diarrhea. admits nausea, vomiting.  She does make a small amount of urine. Family states she has had kidney disease since the end of last year, a kidney biopsy demonstrated the likely cause of her kidney disease is scleroderma.    ED course:   vitals - hr 104, bp 150/85, rr 20, t 97.9, spo2 100  labs - hgb 10.7, Na 129, BUN/Cr 42/3.96, Gluc 140, trop 189.2, bnp >70k. Blood gas ph 7.41, bicarb 20, pco2 32, po2 109  imaging - cxr small b/l pleural effs? and calcified aortic knob on wet read  meds given - lasix 40 x1  EKG - sinus tach 110, lafb, unchanged from prior   (21 Jun 2023 03:24)      INTERVAL HPI:   Patient was seen and examined at bedside.     Patient follows with Dr. Ott for cardiology, last seen two days ago. She had followed up a repeat TTE which showed persistent EF of 20%.     PAST MEDICAL & SURGICAL HISTORY:  ESRD on dialysis      HLD (hyperlipidemia)      HTN (hypertension)      Aortic stenosis      H/O scleroderma      Chronic CHF      S/P TAVR (transcatheter aortic valve replacement)      H/O knee surgery          ECHO FINDINGS:    MEDICATIONS  (STANDING):  aspirin enteric coated 81 milliGRAM(s) Oral daily  atorvastatin 20 milliGRAM(s) Oral at bedtime  furosemide   Injectable 40 milliGRAM(s) IV Push two times a day  heparin   Injectable 5000 Unit(s) SubCutaneous every 8 hours  levothyroxine 100 MICROGram(s) Oral daily  mirtazapine 15 milliGRAM(s) Oral at bedtime  pantoprazole    Tablet 40 milliGRAM(s) Oral before breakfast  verapamil  milliGRAM(s) Oral daily    MEDICATIONS  (PRN):  acetaminophen     Tablet .. 650 milliGRAM(s) Oral every 6 hours PRN Temp greater or equal to 38C (100.4F), Mild Pain (1 - 3)  aluminum hydroxide/magnesium hydroxide/simethicone Suspension 30 milliLiter(s) Oral every 4 hours PRN Dyspepsia  melatonin 3 milliGRAM(s) Oral at bedtime PRN Insomnia  ondansetron Injectable 4 milliGRAM(s) IV Push every 8 hours PRN Nausea and/or Vomiting      FAMILY HISTORY:  No pertinent family history in first degree relatives      Denies Family history of CAD or early MI    ROS:  Constitutional: denies fever, chills  HEENT: denies blurry vision, difficulty hearing  Respiratory: denies SOB, DEMPSEY, cough  Cardiovascular: denies CP, palpitations, orthopnea, PND, LE edema  Gastrointestinal: denies nausea, vomiting, abdominal pain  Genitourinary: denies urinary changes  Skin: Denies rashes, itching  Neurologic: denies headache, weakness, dizziness  Hematology/Oncology: denies bleeding, easy bruising  ROS negative except as noted above      SOCIAL HISTORY:  Former 50 pack year smoker, quit in 9/22  No Alcohol or Drug use    Vital Signs Last 24 Hrs  T(C): 36.3 (21 Jun 2023 07:15), Max: 36.4 (21 Jun 2023 06:37)  T(F): 97.4 (21 Jun 2023 07:15), Max: 97.6 (21 Jun 2023 06:37)  HR: 101 (21 Jun 2023 07:15) (84 - 110)  BP: 147/90 (21 Jun 2023 07:15) (147/90 - 156/86)  BP(mean): --  RR: 19 (21 Jun 2023 07:15) (18 - 20)  SpO2: 100% (21 Jun 2023 07:15) (94% - 100%)    Parameters below as of 21 Jun 2023 07:15  Patient On (Oxygen Delivery Method): nasal cannula  O2 Flow (L/min): 4      Physical Exam:  General: Well developed, well nourished, NAD  HEENT: NCAT, PERRLA, EOMI bl, moist mucous membranes   Neck: Supple, nontender, no mass  Neurology: A&Ox3, nonfocal, sensation intact   Respiratory: CTA B/L, No W/R/R  CV: RRR, +S1/S2, no murmurs, rubs or gallops  Abdominal: Soft, NT, ND +BSx4, no palpable masses  Extremities: No C/C/E, + peripheral pulses  MSK: Normal ROM, no joint erythema or warmth, no joint swelling   Heme: No obvious ecchymosis or petechiae   Skin: warm, dry, normal color      ECG:    I&O's Detail      LABS:                        10.5   4.39  )-----------( 127      ( 21 Jun 2023 06:11 )             33.9     06-21    128<L>  |  93<L>  |  48<H>  ----------------------------<  148<H>  5.3   |  21<L>  |  4.30<H>    Ca    8.9      21 Jun 2023 06:11  Phos  6.9     06-21  Mg     2.0     06-21    TPro  7.7  /  Alb  3.6  /  TBili  0.6  /  DBili  x   /  AST  39<H>  /  ALT  29  /  AlkPhos  78  06-21    CARDIAC MARKERS ( 21 Jun 2023 06:11 )  x     / x     / 73 U/L / x     / 2.4 ng/mL        Urinalysis Basic - ( 21 Jun 2023 06:11 )    Color: x / Appearance: x / SG: x / pH: x  Gluc: 148 mg/dL / Ketone: x  / Bili: x / Urobili: x   Blood: x / Protein: x / Nitrite: x   Leuk Esterase: x / RBC: x / WBC x   Sq Epi: x / Non Sq Epi: x / Bacteria: x      I&O's Summary    BNP    RADIOLOGY & ADDITIONAL STUDIES: [facility] -- Сергей Kemp, Sky, Nolan Dai, Kenn Rolon: Office # [phone]    Patient is a 72y old  Female who presents with a chief complaint of chf exac, dialysis (21 Jun 2023 03:24)      FROM ADMISSION H&P:  HPI:  73 y/o f pmhx ESRD on HD (MWF), HLD, HTN, CHF(EF 20%), aortic stenosis s/p TAVR, Scleroderma, presents with shortness of breath. Patient and family provide history at the bedside. Patient reportedly has been feeling significantly worse over the past two weeks with worse shortness of breath and weakness. She has been sleeping upright on a couch rather than laying down as she is too short of breath. She had an acute visit with cardiology Dr. Friend yesterday who repeated an Echo and found that her EF was still 20%, which it had been for some time. She was told to get a chest x ray but this was not performed. Patient was dialyzed yesterday with removal of 2.8L, but this was stopped early as the patient was too weak. She has home o2 at home which she normally only uses PRN but she has been using it constantly recently. She was last hospitalized in April with similar symptoms. Of note, she vomited a small amount x1 in the [facility] ED. She currently denies chest pain or palpitations, abdominal pain, diarrhea. admits nausea, vomiting.  She does make a small amount of urine. Family states she has had kidney disease since the end of last year, a kidney biopsy demonstrated the likely cause of her kidney disease is scleroderma.    ED course:   vitals - hr 104, bp 150/85, rr 20, t 97.9, spo2 100  labs - hgb 10.7, Na 129, BUN/Cr 42/3.96, Gluc 140, trop 189.2, bnp >70k. Blood gas ph 7.41, bicarb 20, pco2 32, po2 109  imaging - cxr small b/l pleural effs? and calcified aortic knob on wet read  meds given - lasix 40 x1  EKG - sinus tach 110, lafb, unchanged from prior   (21 Jun 2023 03:24)      INTERVAL HPI:   Patient was seen and examined at bedside. She appears to be in much discomfort, endorsing feeling short of breath and all around "unwell." She is awaiting dialysis this morning. Per daughter at bedside, patient has been feeling like this on and off since dialysis started in 11/2022. She reports that she will often feel short of breath before her dialysis sessions. Notes that on Monday, she was feeling too weak to proceed with full dialysis session and only removed 2.8L which is not normal for her.     Patient follows with Dr. Cortez Ott for cardiology, last seen two days ago for a regular follow up. She had followed up a repeat TTE which showed persistent EF of 20%.     PAST MEDICAL & SURGICAL HISTORY:  ESRD on dialysis      HLD (hyperlipidemia)      HTN (hypertension)      Aortic stenosis      H/O scleroderma      Chronic CHF      S/P TAVR (transcatheter aortic valve replacement)      H/O knee surgery          ECHO FINDINGS:    MEDICATIONS  (STANDING):  aspirin enteric coated 81 milliGRAM(s) Oral daily  atorvastatin 20 milliGRAM(s) Oral at bedtime  furosemide   Injectable 40 milliGRAM(s) IV Push two times a day  heparin   Injectable 5000 Unit(s) SubCutaneous every 8 hours  levothyroxine 100 MICROGram(s) Oral daily  mirtazapine 15 milliGRAM(s) Oral at bedtime  pantoprazole    Tablet 40 milliGRAM(s) Oral before breakfast  verapamil  milliGRAM(s) Oral daily    MEDICATIONS  (PRN):  acetaminophen     Tablet .. 650 milliGRAM(s) Oral every 6 hours PRN Temp greater or equal to 38C (100.4F), Mild Pain (1 - 3)  aluminum hydroxide/magnesium hydroxide/simethicone Suspension 30 milliLiter(s) Oral every 4 hours PRN Dyspepsia  melatonin 3 milliGRAM(s) Oral at bedtime PRN Insomnia  ondansetron Injectable 4 milliGRAM(s) IV Push every 8 hours PRN Nausea and/or Vomiting      FAMILY HISTORY:  No pertinent family history in first degree relatives      Denies Family history of CAD or early MI    ROS:  Constitutional: denies fever, chills  HEENT: denies blurry vision, difficulty hearing  Respiratory: admits SOB, no DEMPSEY, cough  Cardiovascular: denies CP, palpitations, orthopnea, PND, LE edema  Gastrointestinal: admits nausea, denies vomiting, abdominal pain  Genitourinary: denies urinary changes  Skin: Denies rashes, itching  Neurologic: denies headache, weakness, dizziness  Hematology/Oncology: denies bleeding, easy bruising        SOCIAL HISTORY:  Former 50 pack year smoker, quit in 9/22  No Alcohol or Drug use    Vital Signs Last 24 Hrs  T(C): 36.3 (21 Jun 2023 07:15), Max: 36.4 (21 Jun 2023 06:37)  T(F): 97.4 (21 Jun 2023 07:15), Max: 97.6 (21 Jun 2023 06:37)  HR: 101 (21 Jun 2023 07:15) (84 - 110)  BP: 147/90 (21 Jun 2023 07:15) (147/90 - 156/86)  BP(mean): --  RR: 19 (21 Jun 2023 07:15) (18 - 20)  SpO2: 100% (21 Jun 2023 07:15) (94% - 100%)    Parameters below as of 21 Jun 2023 07:15  Patient On (Oxygen Delivery Method): nasal cannula  O2 Flow (L/min): 4      Physical Exam:  General: in mild distress   HEENT: NCAT, PERRLA, EOMI bl, moist mucous membranes   Neck: Supple, nontender, no mass  Neurology: A&Ox3, nonfocal, sensation intact   Respiratory: faint crackles in bilateral bases  CV: RRR, +S1/S2, mechanical murmur present  Abdominal: Soft, NT, ND +BSx4, no palpable masses  Extremities: bilateral 1+ pitting edema in lower extremities up to shins, + peripheral pulses  MSK: Normal ROM, no joint erythema or warmth, no joint swelling   Heme: No obvious ecchymosis or petechiae   Skin: warm, dry, normal color      ECG: sinus tachycardia, possible left atrial enlargement, left anterior fascicular block     I&O's Detail      LABS:                        10.5   4.39  )-----------( 127      ( 21 Jun 2023 06:11 )             33.9     06-21    128<L>  |  93<L>  |  48<H>  ----------------------------<  148<H>  5.3   |  21<L>  |  4.30<H>    Ca    8.9      21 Jun 2023 06:11  Phos  6.9     06-21  Mg     2.0     06-21    TPro  7.7  /  Alb  3.6  /  TBili  0.6  /  DBili  x   /  AST  39<H>  /  ALT  29  /  AlkPhos  78  06-21    CARDIAC MARKERS ( 21 Jun 2023 06:11 )  x     / x     / 73 U/L / x     / 2.4 ng/mL        Urinalysis Basic - ( 21 Jun 2023 06:11 )    Color: x / Appearance: x / SG: x / pH: x  Gluc: 148 mg/dL / Ketone: x  / Bili: x / Urobili: x   Blood: x / Protein: x / Nitrite: x   Leuk Esterase: x / RBC: x / WBC x   Sq Epi: x / Non Sq Epi: x / Bacteria: x      I&O's Summary    BNP    RADIOLOGY & ADDITIONAL STUDIES:  CXR with  [facility] -- Сергей Kemp, Sky, Nolan Dai, Kenn Rolon: Office # [phone]    Patient is a 72y old  Female who presents with a chief complaint of chf exac, dialysis (21 Jun 2023 03:24)      FROM ADMISSION H&P:  HPI:  71 y/o f pmhx ESRD on HD (MWF), HLD, HTN, CHF(EF 20%), aortic stenosis s/p TAVR, Scleroderma, presents with shortness of breath. Patient and family provide history at the bedside. Patient reportedly has been feeling significantly worse over the past two weeks with worse shortness of breath and weakness. She has been sleeping upright on a couch rather than laying down as she is too short of breath. She had an acute visit with cardiology Dr. Friend yesterday who repeated an Echo and found that her EF was still 20%, which it had been for some time. She was told to get a chest x ray but this was not performed. Patient was dialyzed yesterday with removal of 2.8L, but this was stopped early as the patient was too weak. She has home o2 at home which she normally only uses PRN but she has been using it constantly recently. She was last hospitalized in April with similar symptoms. Of note, she vomited a small amount x1 in [facility] ED. She currently denies chest pain or palpitations, abdominal pain, diarrhea. admits nausea, vomiting.  She does make a small amount of urine. Family states she has had kidney disease since the end of last year, a kidney biopsy demonstrated the likely cause of her kidney disease is scleroderma.    ED course:   vitals - hr 104, bp 150/85, rr 20, t 97.9, spo2 100  labs - hgb 10.7, Na 129, BUN/Cr 42/3.96, Gluc 140, trop 189.2, bnp >70k. Blood gas ph 7.41, bicarb 20, pco2 32, po2 109  imaging - cxr small b/l pleural effs? and calcified aortic knob on wet read  meds given - lasix 40 x1  EKG - sinus tach 110, lafb, unchanged from prior   (21 Jun 2023 03:24)      INTERVAL HPI:   *Please note, this patient has an alternate MRN [id number]*    Patient was seen and examined at bedside. She appears to be in much discomfort, endorsing feeling short of breath and all around "unwell." She is awaiting dialysis this morning. Per daughter at bedside, patient has been feeling like this on and off since dialysis started in 11/2022. She reports that she will often feel short of breath before her dialysis sessions. Daughters note that she has been having a difficult time with tolerating dialysis with episodes of hypotension limiting sessions. Notes that on Monday, she was feeling too weak to proceed with full dialysis session and removed 2.8L which is around her normal, usually gets 2-3L off.     Patient follows with Dr. Cortez Ott for cardiology, last seen two days ago for a regular follow up to discuss a repeat TTE. This showed a mildly dilated LV chamber, global hypokinesis, severely depressed LV systolic function, EF 25-30%, intact TAVR site, moderate pulmonary HTN.     PAST MEDICAL & SURGICAL HISTORY:  ESRD on dialysis      HLD (hyperlipidemia)      HTN (hypertension)      Aortic stenosis      H/O scleroderma      Chronic CHF      S/P TAVR (transcatheter aortic valve replacement)      H/O knee surgery          ECHO FINDINGS:    MEDICATIONS  (STANDING):  aspirin enteric coated 81 milliGRAM(s) Oral daily  atorvastatin 20 milliGRAM(s) Oral at bedtime  furosemide   Injectable 40 milliGRAM(s) IV Push two times a day  heparin   Injectable 5000 Unit(s) SubCutaneous every 8 hours  levothyroxine 100 MICROGram(s) Oral daily  mirtazapine 15 milliGRAM(s) Oral at bedtime  pantoprazole    Tablet 40 milliGRAM(s) Oral before breakfast  verapamil  milliGRAM(s) Oral daily    MEDICATIONS  (PRN):  acetaminophen     Tablet .. 650 milliGRAM(s) Oral every 6 hours PRN Temp greater or equal to 38C (100.4F), Mild Pain (1 - 3)  aluminum hydroxide/magnesium hydroxide/simethicone Suspension 30 milliLiter(s) Oral every 4 hours PRN Dyspepsia  melatonin 3 milliGRAM(s) Oral at bedtime PRN Insomnia  ondansetron Injectable 4 milliGRAM(s) IV Push every 8 hours PRN Nausea and/or Vomiting      FAMILY HISTORY:  No pertinent family history in first degree relatives      Denies Family history of CAD or early MI    ROS:  Constitutional: denies fever, chills  HEENT: denies blurry vision, difficulty hearing  Respiratory: admits SOB, no DEMPSEY, cough  Cardiovascular: denies CP, palpitations, orthopnea, PND, LE edema  Gastrointestinal: admits nausea, denies vomiting, abdominal pain  Genitourinary: denies urinary changes  Skin: Denies rashes, itching  Neurologic: denies headache, weakness, dizziness  Hematology/Oncology: denies bleeding, easy bruising        SOCIAL HISTORY:  Former 50 pack year smoker, quit in 9/22  No Alcohol or Drug use    Vital Signs Last 24 Hrs  T(C): 36.3 (21 Jun 2023 07:15), Max: 36.4 (21 Jun 2023 06:37)  T(F): 97.4 (21 Jun 2023 07:15), Max: 97.6 (21 Jun 2023 06:37)  HR: 101 (21 Jun 2023 07:15) (84 - 110)  BP: 147/90 (21 Jun 2023 07:15) (147/90 - 156/86)  BP(mean): --  RR: 19 (21 Jun 2023 07:15) (18 - 20)  SpO2: 100% (21 Jun 2023 07:15) (94% - 100%)    Parameters below as of 21 Jun 2023 07:15  Patient On (Oxygen Delivery Method): nasal cannula  O2 Flow (L/min): 4      Physical Exam:  General: in mild distress   HEENT: NCAT, PERRLA, EOMI bl, moist mucous membranes   Neck: Supple, nontender, no mass  Neurology: A&Ox3, nonfocal, sensation intact   Respiratory: faint crackles in bilateral bases  CV: RRR, +S1/S2, mechanical murmur present  Abdominal: Soft, NT, ND +BSx4, no palpable masses  Extremities: bilateral 1+ pitting edema in lower extremities up to shins, + peripheral pulses  MSK: Normal ROM, no joint erythema or warmth, no joint swelling   Heme: No obvious ecchymosis or petechiae   Skin: warm, dry, normal color      ECG: sinus tachycardia, possible left atrial enlargement, left anterior fascicular block     I&O's Detail      LABS:                        10.5   4.39  )-----------( 127      ( 21 Jun 2023 06:11 )             33.9     06-21    128<L>  |  93<L>  |  48<H>  ----------------------------<  148<H>  5.3   |  21<L>  |  4.30<H>    Ca    8.9      21 Jun 2023 06:11  Phos  6.9     06-21  Mg     2.0     06-21    TPro  7.7  /  Alb  3.6  /  TBili  0.6  /  DBili  x   /  AST  39<H>  /  ALT  29  /  AlkPhos  78  06-21    CARDIAC MARKERS ( 21 Jun 2023 06:11 )  x     / x     / 73 U/L / x     / 2.4 ng/mL        Urinalysis Basic - ( 21 Jun 2023 06:11 )    Color: x / Appearance: x / SG: x / pH: x  Gluc: 148 mg/dL / Ketone: x  / Bili: x / Urobili: x   Blood: x / Protein: x / Nitrite: x   Leuk Esterase: x / RBC: x / WBC x   Sq Epi: x / Non Sq Epi: x / Bacteria: x      I&O's Summary    BNP    RADIOLOGY & ADDITIONAL STUDIES:  CXR with  [facility] -- Сергей Kemp, Sky, Nolan Dai, Kenn Rolon: Office # [phone]    Patient is a 72y old  Female who presents with a chief complaint of chf exac, dialysis (21 Jun 2023 03:24)      FROM ADMISSION H&P:  HPI:  71 y/o f pmhx ESRD on HD (MWF), HLD, HTN, CHF(EF 20%), aortic stenosis s/p TAVR, Scleroderma, presents with shortness of breath. Patient and family provide history at the bedside. Patient reportedly has been feeling significantly worse over the past two weeks with worse shortness of breath and weakness. She has been sleeping upright on a couch rather than laying down as she is too short of breath. She had an acute visit with cardiology Dr. Friend yesterday who repeated an Echo and found that her EF was still 20%, which it had been for some time. She was told to get a chest x ray but this was not performed. Patient was dialyzed yesterday with removal of 2.8L, but this was stopped early as the patient was too weak. She has home o2 at home which she normally only uses PRN but she has been using it constantly recently. She was last hospitalized in April with similar symptoms. Of note, she vomited a small amount x1 in the [facility] ED. She currently denies chest pain or palpitations, abdominal pain, diarrhea. admits nausea, vomiting.  She does make a small amount of urine. Family states she has had kidney disease since the end of last year, a kidney biopsy demonstrated the likely cause of her kidney disease is scleroderma.    ED course:   vitals - hr 104, bp 150/85, rr 20, t 97.9, spo2 100  labs - hgb 10.7, Na 129, BUN/Cr 42/3.96, Gluc 140, trop 189.2, bnp >70k. Blood gas ph 7.41, bicarb 20, pco2 32, po2 109  imaging - cxr small b/l pleural effs? and calcified aortic knob on wet read  meds given - lasix 40 x1  EKG - sinus tach 110, lafb, unchanged from prior   (21 Jun 2023 03:24)      INTERVAL HPI:   *Please note, this patient has an alternate MRN [id number]*    Patient was seen and examined at bedside. She appears to be in much discomfort, endorsing feeling short of breath and all around "unwell." She is awaiting dialysis this morning. Per daughter at bedside, patient has been feeling like this on and off since dialysis started in 11/2022. She reports that she will often feel short of breath before her dialysis sessions. Daughters note that she has been having a difficult time with tolerating dialysis with episodes of hypotension limiting sessions. Notes that on Monday, she was feeling too weak to proceed with full dialysis session and removed 2.8L which is around her normal, usually gets 2-3L off.     Patient follows with Dr. Cortez Ott for cardiology, last seen two days ago for a regular follow up to discuss a repeat TTE. This showed a mildly dilated LV chamber, global hypokinesis, severely depressed LV systolic function, EF 25-30%, intact TAVR site, moderate pulmonary HTN. Patient does have history of HTN, she was previously on lisinopril which was discontinued due to hypotension with dialysis. She is on verapamil given her history of achalasia and to give dual benefit of hypertension management.     PAST MEDICAL & SURGICAL HISTORY:  ESRD on dialysis      HLD (hyperlipidemia)      HTN (hypertension)      Aortic stenosis      H/O scleroderma      Chronic CHF      S/P TAVR (transcatheter aortic valve replacement)      H/O knee surgery          ECHO FINDINGS:  TTE mildly dilated LV chamber, global hypokinesis, severely depressed LV systolic function, EF 25-30%, intact TAVR site, moderate pulmonary HTN    MEDICATIONS  (STANDING):  aspirin enteric coated 81 milliGRAM(s) Oral daily  atorvastatin 20 milliGRAM(s) Oral at bedtime  furosemide   Injectable 40 milliGRAM(s) IV Push two times a day  heparin   Injectable 5000 Unit(s) SubCutaneous every 8 hours  levothyroxine 100 MICROGram(s) Oral daily  mirtazapine 15 milliGRAM(s) Oral at bedtime  pantoprazole    Tablet 40 milliGRAM(s) Oral before breakfast  verapamil  milliGRAM(s) Oral daily    MEDICATIONS  (PRN):  acetaminophen     Tablet .. 650 milliGRAM(s) Oral every 6 hours PRN Temp greater or equal to 38C (100.4F), Mild Pain (1 - 3)  aluminum hydroxide/magnesium hydroxide/simethicone Suspension 30 milliLiter(s) Oral every 4 hours PRN Dyspepsia  melatonin 3 milliGRAM(s) Oral at bedtime PRN Insomnia  ondansetron Injectable 4 milliGRAM(s) IV Push every 8 hours PRN Nausea and/or Vomiting      FAMILY HISTORY:  No pertinent family history in first degree relatives      Denies Family history of CAD or early MI    ROS:  Constitutional: admits generalized weakness, denies fever, chills  HEENT: denies blurry vision, difficulty hearing  Respiratory: admits SOB, DEMPSEY, denies cough  Cardiovascular: admits mild LE edema, denies CP, palpitations, orthopnea, PND  Gastrointestinal: admits nausea, denies vomiting, abdominal pain  Genitourinary: denies urinary changes  Skin: Denies rashes, itching  Neurologic: denies headache, weakness, dizziness  Hematology/Oncology: denies bleeding, easy bruising        SOCIAL HISTORY:  Former 50 pack year smoker, quit in 9/22  No Alcohol or Drug use    Vital Signs Last 24 Hrs  T(C): 36.3 (21 Jun 2023 07:15), Max: 36.4 (21 Jun 2023 06:37)  T(F): 97.4 (21 Jun 2023 07:15), Max: 97.6 (21 Jun 2023 06:37)  HR: 101 (21 Jun 2023 07:15) (84 - 110)  BP: 147/90 (21 Jun 2023 07:15) (147/90 - 156/86)  BP(mean): --  RR: 19 (21 Jun 2023 07:15) (18 - 20)  SpO2: 100% (21 Jun 2023 07:15) (94% - 100%)    Parameters below as of 21 Jun 2023 07:15  Patient On (Oxygen Delivery Method): nasal cannula  O2 Flow (L/min): 4      Physical Exam:  General: in mild distress   HEENT: NCAT, PERRLA, EOMI bl, moist mucous membranes   Neck: Supple, nontender, no mass  Neurology: A&Ox3, nonfocal, sensation intact   Respiratory: faint crackles in bilateral bases  CV: RRR, +S1/S2, mechanical murmur present  Abdominal: Soft, NT, ND +BSx4, no palpable masses  Extremities: bilateral 1+ pitting edema in lower extremities up to shins, + peripheral pulses  MSK: Normal ROM, no joint erythema or warmth, no joint swelling   Heme: No obvious ecchymosis or petechiae   Skin: warm, dry, normal color      ECG: sinus tachycardia, possible left atrial enlargement, left anterior fascicular block     I&O's Detail      LABS:                        10.5   4.39  )-----------( 127      ( 21 Jun 2023 06:11 )             33.9     06-21    128<L>  |  93<L>  |  48<H>  ----------------------------<  148<H>  5.3   |  21<L>  |  4.30<H>    Ca    8.9      21 Jun 2023 06:11  Phos  6.9     06-21  Mg     2.0     06-21    TPro  7.7  /  Alb  3.6  /  TBili  0.6  /  DBili  x   /  AST  39<H>  /  ALT  29  /  AlkPhos  78  06-21    CARDIAC MARKERS ( 21 Jun 2023 06:11 )  x     / x     / 73 U/L / x     / 2.4 ng/mL        Urinalysis Basic - ( 21 Jun 2023 06:11 )    Color: x / Appearance: x / SG: x / pH: x  Gluc: 148 mg/dL / Ketone: x  / Bili: x / Urobili: x   Blood: x / Protein: x / Nitrite: x   Leuk Esterase: x / RBC: x / WBC x   Sq Epi: x / Non Sq Epi: x / Bacteria: x      I&O's Summary    BNP    RADIOLOGY & ADDITIONAL STUDIES:  CXR from [facility] chart suggestive of small effusion in right lung

## 2023-06-21 NOTE — DISCHARGE NOTE NURSING/CASE MANAGEMENT/SOCIAL WORK - NSDCPEFALRISK_GEN_ALL_CORE
For information on Fall & Injury Prevention, visit: [email or web address]  [email or web address] OR  https://www.cdc.gov/steadi/patient.html

## 2023-06-21 NOTE — PROGRESS NOTE ADULT - PROBLEM SELECTOR PLAN 2
troponin ~200 on admission  EKG without new ischemic changes  patient does complain of some chest discomfort but denies significant chest pain  will trend to peak very likely demand ischemia. Pt also with ESRD which decreases clearance of troponin in the bloodstream.

## 2023-06-21 NOTE — H&P ADULT - PROBLEM SELECTOR PLAN 1
Patient presents with shortness of breath likely volume overload 2/2 acute on chronic CHF exacerbation concomitant with ESRD  - Admit to Tele  - CXR: small b/l pleural effs? on wet read w/ calcified aortic knob  - CT chest:  - Asymptomatic with no signs or symptoms of volume overload  - Given IV Lasix 40 mg x1, IV NS 1 L bolus x1 in ED  - Lasix 40 mg IVP q12h with hold parameters and close monitoring of renal function  - Continue/Hold home medications:   - Unless contraindicated: ACE/ARBs, BB  - Supplemental O2 to maintain O2 sat >90%. Will attempt to wean patient to baseline O2 status  - F/u TTE  - Strict I/Os, daily weights  - Monitor and replace electrolytes  - Cardiology consulted ([facility]), f/u recs/recs appreciated Patient presents with shortness of breath likely volume overload 2/2 acute on chronic CHF exacerbation concomitant with ESRD  - Admit to Tele  - CXR: small b/l pleural effs? on wet read w/ calcified aortic knob  - appears significantly volume overloaded with increased work of breathing, intermittently regurgitating frothy liquid  - Given IV Lasix 40 mg x1, lasix 60mg x1 with some output  - Lasix 40 mg IVP q12h with hold parameters   - Supplemental O2 to maintain O2 sat >90%  - ABG: Ph 7.37, pCO2 29, bicarb 17  - pro BNP >70,000 - (higher than upper limit likely 2/2 ESRD)  - TTE from [facility] with 20% ef  - Strict I/Os, daily weights  - Monitor and replace electrolytes  - Cardiology consulted ([facility]), f/u recs

## 2023-06-21 NOTE — CONSULT NOTE ADULT - ATTENDING COMMENTS
known severe cmy with ef 25-30  has been having difficulty with hd, with hypotension, less so over the past month  incomplete hd last week  now vol ol  prob needs more hd, and may need to have midodrine added if becomes more hypotensive  hf meds not ideal, but has soft bp, and is taking verapamil for achalasia issues, which are in the process of being evaluated    mildly elevated trop but does not have sig cad, likely demand from hf and decr renal clearance

## 2023-06-21 NOTE — ED PROVIDER NOTE - CONSTITUTIONAL, MLM
Well appearing, awake, alert, oriented to person, place, time/situation and patient in acute distress normal...

## 2023-06-21 NOTE — CONSULT NOTE ADULT - SUBJECTIVE AND OBJECTIVE BOX
Patient is a 72y old  Female who presents with a chief complaint of chf exac, dialysis (21 Jun 2023 08:11)    HPI:  73 y/o f pmhx ESRD on HD (MWF), HLD, HTN, CHF(EF 20%), aortic stenosis s/p TAVR, Scleroderma, presents with shortness of breath. Patient and family provide history at the bedside. Patient reportedly has been feeling significantly worse over the past two weeks with worse shortness of breath and weakness. She has been sleeping upright on a couch rather than laying down as she is too short of breath. She had an acute visit with cardiology Dr. Friend yesterday who repeated an Echo and found that her EF was still 20%, which it had been for some time. She was told to get a chest x ray but this was not performed. Patient was dialyzed yesterday with removal of 2.8L, but this was stopped early as the patient was too weak. She has home o2 at home which she normally only uses PRN but she has been using it constantly recently. She was last hospitalized in April with similar symptoms. Of note, she vomited a small amount x1 in the hospitals ED. She currently denies chest pain or palpitations, abdominal pain, diarrhea. admits nausea, vomiting.  She does make a small amount of urine. Family states she has had kidney disease since the end of last year, a kidney biopsy demonstrated the likely cause of her kidney disease is scleroderma.    ED course:   vitals - hr 104, bp 150/85, rr 20, t 97.9, spo2 100  labs - hgb 10.7, Na 129, BUN/Cr 42/3.96, Gluc 140, trop 189.2, bnp >70k. Blood gas ph 7.41, bicarb 20, pco2 32, po2 109  imaging - cxr small b/l pleural effs? and calcified aortic knob on wet read  meds given - lasix 40 x1  EKG - sinus tach 110, lafb, unchanged from prior   (21 Jun 2023 03:24)      PAST MEDICAL HISTORY:  ESRD on dialysis    HLD (hyperlipidemia)    HTN (hypertension)    Acute on chronic systolic congestive heart failure    Aortic stenosis    H/O scleroderma    Chronic CHF        PAST SURGICAL HISTORY:  S/P TAVR (transcatheter aortic valve replacement)    H/O knee surgery        FAMILY HISTORY:  No pertinent family history in first degree relatives        SOCIAL HISTORY:    Allergies    No Known Allergies    Intolerances      Home Medications:  aspirin 81 mg oral capsule: 1 cap(s) orally once a day (21 Jun 2023 04:31)  Colace 100 mg oral capsule: 1 cap(s) orally once a day (21 Jun 2023 04:31)  levothyroxine 100 mcg (0.1 mg) oral tablet: 1 tab(s) orally once a day (21 Jun 2023 04:31)  omeprazole 40 mg oral delayed release capsule: 1 cap(s) orally once a day (21 Jun 2023 04:31)  Remeron 15 mg oral tablet: 1 tab(s) orally once a day (21 Jun 2023 04:31)  rosuvastatin 5 mg oral tablet: 1 tab(s) orally once a day (21 Jun 2023 04:31)  sevelamer carbonate 800 mg oral tablet: 1 tab(s) orally once a day (21 Jun 2023 04:31)  Verelan  mg/24 hours oral capsule, extended release: 1 cap(s) orally once a day (21 Jun 2023 04:31)    MEDICATIONS  (STANDING):  aspirin enteric coated 81 milliGRAM(s) Oral daily  atorvastatin 20 milliGRAM(s) Oral at bedtime  furosemide   Injectable 40 milliGRAM(s) IV Push two times a day  heparin   Injectable 5000 Unit(s) SubCutaneous every 8 hours  levothyroxine 100 MICROGram(s) Oral daily  mirtazapine 15 milliGRAM(s) Oral at bedtime  pantoprazole    Tablet 40 milliGRAM(s) Oral before breakfast  verapamil  milliGRAM(s) Oral daily    MEDICATIONS  (PRN):  acetaminophen     Tablet .. 650 milliGRAM(s) Oral every 6 hours PRN Temp greater or equal to 38C (100.4F), Mild Pain (1 - 3)  aluminum hydroxide/magnesium hydroxide/simethicone Suspension 30 milliLiter(s) Oral every 4 hours PRN Dyspepsia  melatonin 3 milliGRAM(s) Oral at bedtime PRN Insomnia  ondansetron Injectable 4 milliGRAM(s) IV Push every 8 hours PRN Nausea and/or Vomiting      REVIEW OF SYSTEMS:  General:   Respiratory: No cough, SOB  Cardiovascular: No CP or Palpitations	  Gastrointestinal: No nausea, Vomiting. No diarrhea  Genitourinary: No urinary complaints	  Musculoskeletal: No leg swelling, No new rash or lesions	  Neurological: 	  all other systems negative    T(F): 97.4 (06-21-23 @ 07:15), Max: 97.6 (06-21-23 @ 06:37)  HR: 101 (06-21-23 @ 07:15) (84 - 110)  BP: 147/90 (06-21-23 @ 07:15) (147/90 - 156/86)  RR: 19 (06-21-23 @ 07:15) (18 - 20)  SpO2: 100% (06-21-23 @ 07:15) (94% - 100%)  Wt(kg): --    PHYSICAL EXAM:  General: NAD  Respiratory: b/l air entry  Cardiovascular: S1 S2  Gastrointestinal: soft  Extremities: edema        06-21    128<L>  |  93<L>  |  48<H>  ----------------------------<  148<H>  5.3   |  21<L>  |  4.30<H>    Ca    8.9      21 Jun 2023 06:11  Phos  6.9     06-21  Mg     2.0     06-21    TPro  7.7  /  Alb  3.6  /  TBili  0.6  /  DBili  x   /  AST  39<H>  /  ALT  29  /  AlkPhos  78  06-21                          10.5   4.39  )-----------( 127      ( 21 Jun 2023 06:11 )             33.9       Potassium: 5.3 mmol/L (06-21 @ 06:11)  Blood Urea Nitrogen: 48 mg/dL (06-21 @ 06:11)  Calcium: 8.9 mg/dL (06-21 @ 06:11)  Hemoglobin: 10.5 g/dL (06-21 @ 06:11)      Creatinine, Serum: 4.30 (06-21 @ 06:11)      Urinalysis Basic - ( 21 Jun 2023 06:11 )    Color: x / Appearance: x / SG: x / pH: x  Gluc: 148 mg/dL / Ketone: x  / Bili: x / Urobili: x   Blood: x / Protein: x / Nitrite: x   Leuk Esterase: x / RBC: x / WBC x   Sq Epi: x / Non Sq Epi: x / Bacteria: x      LIVER FUNCTIONS - ( 21 Jun 2023 06:11 )  Alb: 3.6 g/dL / Pro: 7.7 g/dL / ALK PHOS: 78 U/L / ALT: 29 U/L / AST: 39 U/L / GGT: x           CARDIAC MARKERS ( 21 Jun 2023 06:11 )  x     / x     / 73 U/L / x     / 2.4 ng/mL      Creatine Kinase, Serum: 73 U/L (06-21-23 @ 06:11)        ABG - ( 21 Jun 2023 04:56 )  pH, Arterial: 7.37  pH, Blood: x     /  pCO2: 29    /  pO2: 159   / HCO3: 17    / Base Excess: -8.5  /  SaO2: 100.0             I&O's Detail

## 2023-06-21 NOTE — ED PROVIDER NOTE - SKIN, MLM
Skin normal color for race, warm, dry and intact. No evidence of rash. Dry mucous membranes. +Left arm fistula

## 2023-06-21 NOTE — CARE COORDINATION ASSESSMENT. - NSDCPLANSERVICES_GEN_ALL_CORE
Hopeful for return home with spouse & HCS with resumption of out patient dialysis tx at [facility]./Dialysis/Home Care/Same as Prior Admission

## 2023-06-21 NOTE — PATIENT CHOICE NOTE. - NSPTCHOICESTATE_GEN_ALL_CORE

## 2023-06-21 NOTE — ED ADULT NURSE NOTE - CHIEF COMPLAINT QUOTE
Patient brought in by ambulance from [facility] initially complaining of shortness of breath and weakness admitted for CHF; dialysis patient had dialysis Monday was seen by cardiology yesterday. Elevated BNP and elevated troponin 75,000; had IV lasix 40mg IV dorothea to right wrist 20g on BIPAP

## 2023-06-21 NOTE — PROGRESS NOTE ADULT - PROBLEM SELECTOR PLAN 6
Chronic, stable on admission  - Continue home medications verapamil (pt takes 200mg qd of extended release, unavailable at [facility] - will give 180 qd)  w/ hold params  - bp stable

## 2023-06-21 NOTE — H&P ADULT - ATTENDING COMMENTS
71 y/o f pmhx ESRD on HD (MWF), HLD, HTN, CHF, aortic stenosis s/p TAVR, Scleroderma, presents with shortness of breath, admitted with volume overload 2/2 CHF, ESRD.    Agree with above. Edited where appropriate.     Palliative eval for ongoing C   Nephro and cardio consulted

## 2023-06-21 NOTE — PATIENT PROFILE ADULT - FALL HARM RISK - HARM RISK INTERVENTIONS

## 2023-06-21 NOTE — ED PROVIDER NOTE - NSICDXPASTMEDICALHX_GEN_ALL_CORE_FT
PAST MEDICAL HISTORY:  Aortic stenosis     Chronic CHF     ESRD on dialysis     H/O scleroderma     HLD (hyperlipidemia)     HTN (hypertension)

## 2023-06-21 NOTE — PROGRESS NOTE ADULT - PROBLEM SELECTOR PLAN 1
Patient presents with shortness of breath likely volume overload 2/2 acute on chronic CHF exacerbation concomitant with ESRD  - CXR: small b/l pleural effusion    on wet read w/ calcified aortic knob  - appears significantly volume overloaded with increased work of breathing, intermittently regurgitating frothy liquid  - Lasix 40 mg IVP q12h with hold parameters   - Supplemental O2 to maintain O2 sat >90%  - ABG: Ph 7.37, pCO2 29, bicarb 17  - pro BNP >70,000 - (higher than upper limit likely 2/2 ESRD)  - TTE from [facility] with 20% ef  - Strict I/Os, daily weight  - Cardiology consulted ([facility]

## 2023-06-21 NOTE — H&P ADULT - NSHPPHYSICALEXAM_GEN_ALL_CORE
T(C): 36.1 (06-21-23 @ 03:21), Max: 36.1 (06-21-23 @ 03:21)  HR: 110 (06-21-23 @ 03:26) (84 - 110)  BP: 149/92 (06-21-23 @ 03:21) (149/92 - 149/92)  RR: 18 (06-21-23 @ 03:21) (18 - 18)  SpO2: 100% (06-21-23 @ 03:26) (94% - 100%)    GENERAL: patient appears acutely ill with labored breathing. Appropriate, pleasant  EYES: sclera clear, no exudates  ENMT: oropharynx clear without erythema, no exudates, moist mucous membranes  NECK: supple, soft, no thyromegaly noted  LUNGS: good air entry bilaterally, clear to auscultation, symmetric breath sounds, no wheezing or rhonchi appreciated  HEART: S1/S2, regular rate and rhythm, no murmurs noted. 1+ b/l pitting edema  GASTROINTESTINAL: abdomen is soft, nontender, nondistended, normoactive bowel sounds, no palpable masses  INTEGUMENT: good skin turgor, no lesions noted  MUSCULOSKELETAL: no clubbing or cyanosis, no obvious deformity  NEUROLOGIC: awake, alert, oriented x4, good muscle tone in 4 extremities, no obvious sensory deficits  PSYCHIATRIC: mood is good, affect is congruent, linear and logical thought process  HEME/LYMPH: no obvious ecchymosis or petechiae T(C): 36.1 (06-21-23 @ 03:21), Max: 36.1 (06-21-23 @ 03:21)  HR: 110 (06-21-23 @ 03:26) (84 - 110)  BP: 149/92 (06-21-23 @ 03:21) (149/92 - 149/92)  RR: 18 (06-21-23 @ 03:21) (18 - 18)  SpO2: 100% (06-21-23 @ 03:26) (94% - 100%)  GENERAL: patient appears acutely ill with labored breathing. Appropriate, pleasant  EYES: sclera clear, no exudates  ENMT: oropharynx clear without erythema, no exudates, moist mucous membranes  NECK: supple, soft, no thyromegaly noted  LUNGS: good air entry bilaterally, clear to auscultation, symmetric breath sounds, no wheezing or rhonchi appreciated  HEART: S1/S2, regular rate and rhythm, no murmurs noted. 1+ b/l pitting edema  GASTROINTESTINAL: abdomen is soft, nontender, nondistended, normoactive bowel sounds, no palpable masses  INTEGUMENT: good skin turgor, no lesions noted  MUSCULOSKELETAL: no clubbing or cyanosis, no obvious deformity  NEUROLOGIC: awake, alert, oriented x4, good muscle tone in 4 extremities, no obvious sensory deficits  PSYCHIATRIC: mood is good, affect is congruent, linear and logical thought process  HEME/LYMPH: no obvious ecchymosis or petechiae

## 2023-06-21 NOTE — H&P ADULT - PROBLEM SELECTOR PLAN 3
dialysis MWF  - Monday dialysis w removal of 2.8L  - Patient with some scant urine output  - electrolytes currently stable, continue to monitor with routine labwork  - Nephro consulted (Sophia) f/u recs dialysis MWF. kidney disease reportedly 2/2 scleroderma  - Monday dialysis w removal of 2.8L  - Patient with some scant urine output  - electrolytes currently stable, continue to monitor with routine labwork  - Nephro consulted (Sophia) f/u recs

## 2023-06-21 NOTE — H&P ADULT - PROBLEM SELECTOR PLAN 6
Chronic, stable on admission  - Continue home medications verapamil (pt takes 200mg qd of extended release, unavailable at [facility] - will give 180 qd)  w/ hold params  - Monitor routine hemodynamics

## 2023-06-21 NOTE — PROGRESS NOTE ADULT - TIME BILLING
pt seen and examine today see above plan -71 y/o f pmhx ESRD on HD (MWF), HLD, HTN, CHF, aortic stenosis s/p TAVR, Scleroderma, presents with shortness of breath, admitted with volume overload 2/2 CHF, ESRD.

## 2023-06-21 NOTE — CONSULT NOTE ADULT - ASSESSMENT
71yo female with PMH of ESRD on HD (M/W/F), CHF (EF 20%), AS s/p TAVR, HTN, HLD, scleroderma, hypothyroidism who presented to the ED with a complaint of shortness of breath, admitted for volume overload suspected secondary to acute on chronic CHF exacerbation. Cardiology consulted for suspected CHF exacerbation.    #Acute on chronic systolic CHF  - Patient presenting with shortness of breath  - TTE performed recently by outpatient cardiologist Dr. Ott showing EF 20%   - Received Lasix 60mg IVP x1 in ED, patient still produces some urine  - Continue Lasix 40mg IVP q12  - Please maintain strict Is/Os, check daily weights  - Monitor and replete lytes, keep K >4, Mg >2    #Elevated troponin  - Troponin 275.8 on presentation to the ED  - ECG showing sinus tachycardia, possible left atrial enlargement, left anterior fascicular block   - Trend enzymes to peak  73yo female with PMH of ESRD on HD (M/W/F), CHF (EF 20%), AS s/p TAVR, HTN, HLD, scleroderma, hypothyroidism who presented to the ED with a complaint of shortness of breath, admitted for volume overload suspected secondary to acute on chronic CHF exacerbation. Cardiology consulted for suspected CHF exacerbation.    #Acute on chronic systolic CHF  - Patient presenting with shortness of breath, suspect that her shortness of breath is multifactorial in the setting   - TTE performed recently by outpatient cardiologist Dr. Ott obtained from office and placed in chart. Shows mild dilated LV chamber, global hypokinesis, severely depressed LV systolic function with EF 25-30%, intact TAVR site, moderate pHTN  - Received Lasix 60mg IVP x1 in ED, patient still produces some urine  - Continue Lasix 40mg IVP q12  - Please maintain strict Is/Os, check daily weights  - Monitor and replete lytes, keep K >4, Mg >2    #Elevated troponin  - Troponin 275.8 on presentation to the ED  - ECG showing sinus tachycardia, possible left atrial enlargement, left anterior fascicular block   - Trend enzymes to peak  71yo female with PMH of ESRD on HD (M/W/F), CHF (EF 20%), AS s/p TAVR, mild non obstructive CAD, HTN, HLD, scleroderma, hypothyroidism, peripheral vascular disease who presented to the ED with a complaint of shortness of breath, admitted for volume overload suspected secondary to acute on chronic CHF exacerbation. Cardiology consulted for suspected CHF exacerbation.    #Acute on decompensated systolic CHF exacerbation  - Patient presenting with shortness of breath, suspect that her shortness of breath is multifactorial in the setting of volume overload from poor volume removal during dialysis sessions due to intolerance for completing sessions vs acute CHF exacerbation   - TTE performed recently by outpatient cardiologist Dr. Ott obtained from office and placed in chart. Shows mild dilated LV chamber, global hypokinesis, severely depressed LV systolic function with EF 25-30%, intact TAVR site, moderate pHTN  - Received Lasix 60mg IVP x1 in ED, patient still produces some urine  - Patient will require aggressive volume removal with dialysis as this will be most beneficial with her fluid status  - BNP >70k in ED, likely not as reliable predictor of volume status as patient with ESRD  - Can continue Lasix 40mg IVP q12 for now   - Not on ACE-I/ARB given history of hypotension in dialysis, appears to not have been started on beta blocker from cardiology notes  - Currently on Verapamil which is not a first line choice in this patient with CHF but is on for dual benefit of hypertension management and for achalasia, will continue inpatient  - Please maintain strict Is/Os, check daily weights  - Monitor and replete lytes, keep K >4, Mg >2    #Elevated troponin  - Troponin 189.2 > 275.8   - She has history of mild nonobstructive coronary disease and has never required a stent placement   - ECG showing sinus tachycardia, possible left atrial enlargement, left anterior fascicular block. Appears unchanged compared to prior ECG   - Troponin likely elevated in the setting of demand from volume overload, also elevated due to poor clearance from ESRD  - Would trend to peak  - Continue aspirin, statin     #HTN  - Patient with history of hypertension, has had periods of hypotension during dialysis and was taken off ACE-I for this reason  - Now on verapamil for dual benefit of achalasia and hypertension management  - Would tolerate higher systolic blood pressures to allow room for volume removal at dialysis  - Monitor routine hemodynamics    #Severe aortic stenosis  - S/p TAVR 11/2022  - Per recent TTE, valve is well seated

## 2023-06-21 NOTE — ED ADULT TRIAGE NOTE - CHIEF COMPLAINT QUOTE
Patient brought in by ambulance from [facility] initially complaining of shortness of breath and weakness admitted for CHF; dialysis patient had dialysis Monday was seen by cardiology yesterday. Elevated BNP and elevated troponin 75,000; had IV lasix 40mg IV dorothea to right wrist 20g on BIPAP

## 2023-06-21 NOTE — H&P ADULT - NSICDXPASTMEDICALHX_GEN_ALL_CORE_FT
PAST MEDICAL HISTORY:  Acute on chronic systolic congestive heart failure     Aortic stenosis     ESRD on dialysis     H/O scleroderma     HLD (hyperlipidemia)     HTN (hypertension)

## 2023-06-21 NOTE — ED PROVIDER NOTE - NSICDXPASTMEDICALHX_GEN_ALL_CORE_FT
PAST MEDICAL HISTORY:  Congestive heart failure (CHF)     ESRD on dialysis     History of COPD     History of scleroderma     HLD (hyperlipidemia)

## 2023-06-21 NOTE — H&P ADULT - CONVERSATION DETAILS
HCP = daughter /     Discussed GOC with patient and family at the bedside. Patient, who is currently AAOx4, states that she would want to b HCP = daughter /     Discussed GOC with patient and family at the bedside. Patient, who is currently AAOx4, states that she is adamant that she would not want to be resuscitated with CPR under any circumstances. She also states that she would not want to be intubated under any circumstances. Family at bedside states she has been expressing these thoughts over the last 6 months and has not wavered in her choices. Family believes she is 'not in her right mind' and that as HCP, they should have input into the situation and would like her to be full code.     Goals of care discussion with patient repeated multiple times with multiple witnesses including Dr Velez, Dr Berry, nursing staff, respiratory therapist. HCP = daughter /     Discussed GOC with patient and family at the bedside. Patient, who is currently AAOx4, states that she is adamant that she would not want to be resuscitated with CPR under any circumstances. She also states that she would not want to be intubated under any circumstances. Family at bedside states she has been expressing these thoughts over the last 6 months and has not wavered in her choices. Family believes she is 'not in her right mind' and that as HCP, they should have input into the situation and would like her to be full code.     Goals of care discussion with patient repeated multiple times with multiple witnesses including Dr Velez, Dr Berry, nursing staff, respiratory therapist.    Additional family called in by daughter to speak with patient. Patient had private conversation with daughters and  for several minutes.    GOC conversation again discussed w/ patient w Dr Velez, Dr Berry, and multiple other witnesses where patient reaffirms DNR/DNI status. When presented with MOLST form to sign, patient declines and states "I want to see my grandchildren".    Full palliative evaluation to follow this am    time spent 45 minutes. HCP = daughter /     Discussed GOC with patient and family at the bedside. Patient, who is currently AAOx4, states that she is adamant that she would not want to be resuscitated with CPR under any circumstances. She also states that she would not want to be intubated under any circumstances. Family at bedside states she has been expressing these thoughts over the last 6 months and has not wavered in her choices. Family believes she is 'not in her right mind' and that as HCP, they should have input into the situation and would like her to be full code.     Goals of care discussion with patient repeated multiple times with multiple witnesses including Dr Velez, Dr Berry, nursing staff, respiratory therapist.    Additional family called in by daughter to speak with patient. Patient had private conversation with daughters and  for several minutes.    GOC conversation again discussed w/ patient w Dr Velez, Dr Berry, and multiple other witnesses where patient reaffirms DNR/DNI status. When presented with MOLST form to sign, patient declines and states "I want to see my grandchildren".    Full palliative evaluation to follow this am. WIll keep full code pending eval     time spent 45 minutes.

## 2023-06-21 NOTE — ED PROVIDER NOTE - PROGRESS NOTE DETAILS
Case d/w with Dr Ramez Gonzales and Dr. Gabriela Velez Case d/w patient's PMD Dr. Calos Shay who agrees that patient should be transferred to [place] and that transfer to [facility] would not occur until the morning.  Daughter Joanne agreeable to tx to [place]

## 2023-06-21 NOTE — ED PROVIDER NOTE - CLINICAL SUMMARY MEDICAL DECISION MAKING FREE TEXT BOX
72 year old female with COPD, CHF, ESRD on HD p/w SOB x 2 days, acutely worsening today. Was seen by cardiology yesterday, was advised to obtain CXR due to concern of fluid overload.  Now with worsening sx and respiratory distress. Check labs, CE, BNP, CXR, EKG, start on BiPAP. transfer for dialysis

## 2023-06-21 NOTE — H&P ADULT - HISTORY OF PRESENT ILLNESS
ED course:   vitals - hr 104, bp 150/85, rr 20, t 97.9, spo2 100  labs - hgb 10.7, Na 129, BUN/Cr 42/3.96, Gluc 140, trop 189.2, bnp >70k. Blood gas ph 7.41, bicarb 20, pco2 32, po2 109  imaging - cxr small b/l pleural effs? and calcified aortic knob on wet read  meds given - lasix 40 x1  EKG - sinus tach 110, lafb, unchanged from prior   71 y/o f pmhx ESRD on HD (MWF), HLD, HTN, CHF, aortic stenosis s/p TAVR, Scleroderma, presents with shortness of breath. Patient and family provide history at the bedside. Patient reportedly has been feeling significantly worse over the past two weeks with worse shortness of breath and weakness. She has been sleeping upright on a couch rather than laying down as she is too short of breath. She had an acute visit with cardiology Dr. Friend yesterday who repeated an Echo and found that her EF was still 20%, which it had been for some time. She was told to get a chest x ray but this was not performed. Patient was dialyzed yesterday with removal of 2.8L, but this was stopped early as the patient was too weak. She has home o2 at home which she normally only uses PRN but she has been using it constantly recently. She was last hospitalized in April with similar symptoms. Of note, she vomited a small amount x1 in the [facility] ED. She currently denies chest pain or palpitations, abdominal pain, nausea, vomitting or diarrhea. She does make a small amount of urine. Family states she has had kidney disease since the end of last year, a kidney biopsy demonstrated the likely cause of her kidney disease is scleroderma.    ED course:   vitals - hr 104, bp 150/85, rr 20, t 97.9, spo2 100  labs - hgb 10.7, Na 129, BUN/Cr 42/3.96, Gluc 140, trop 189.2, bnp >70k. Blood gas ph 7.41, bicarb 20, pco2 32, po2 109  imaging - cxr small b/l pleural effs? and calcified aortic knob on wet read  meds given - lasix 40 x1  EKG - sinus tach 110, lafb, unchanged from prior   71 y/o f pmhx ESRD on HD (MWF), HLD, HTN, CHF(EF 20%), aortic stenosis s/p TAVR, Scleroderma, presents with shortness of breath. Patient and family provide history at the bedside. Patient reportedly has been feeling significantly worse over the past two weeks with worse shortness of breath and weakness. She has been sleeping upright on a couch rather than laying down as she is too short of breath. She had an acute visit with cardiology Dr. Friend yesterday who repeated an Echo and found that her EF was still 20%, which it had been for some time. She was told to get a chest x ray but this was not performed. Patient was dialyzed yesterday with removal of 2.8L, but this was stopped early as the patient was too weak. She has home o2 at home which she normally only uses PRN but she has been using it constantly recently. She was last hospitalized in April with similar symptoms. Of note, she vomited a small amount x1 in the [facility] ED. She currently denies chest pain or palpitations, abdominal pain, diarrhea. admits nausea, vomiting.  She does make a small amount of urine. Family states she has had kidney disease since the end of last year, a kidney biopsy demonstrated the likely cause of her kidney disease is scleroderma.    ED course:   vitals - hr 104, bp 150/85, rr 20, t 97.9, spo2 100  labs - hgb 10.7, Na 129, BUN/Cr 42/3.96, Gluc 140, trop 189.2, bnp >70k. Blood gas ph 7.41, bicarb 20, pco2 32, po2 109  imaging - cxr small b/l pleural effs? and calcified aortic knob on wet read  meds given - lasix 40 x1  EKG - sinus tach 110, lafb, unchanged from prior

## 2023-06-21 NOTE — CARE COORDINATION ASSESSMENT. - NSCAREPROVIDERS_GEN_ALL_CORE_FT
CARE PROVIDERS:  Accepting Physician: Gabriela Velez  Administration: Freda Stokes  Administration: Aminah Dunbar  Administration: Jany Myers  Administration: Jeffy Salcido  Admitting: Gabriela Velez  Attending: Sondra Diaz  Consultant: Donte Cortes  Consultant: Nicholas Swanson  Consultant: Ruth Mariano  Covering Team: Gabriela Velez  ED Attending: Ramez Gonzales  ED Nurse: Ally Giraldo  Nurse: Tasia Delgadillo  Nurse: Sharon Humphrey  Nurse: Naheed Diaz  Nurse: Adama Villela  Nurse: Maddie Wright  Ordered: ADM, User  PCA/Nursing Assistant: Bee Mead  Primary Team: Sondra Diaz  Primary Team: Robby Berry  Primary Team: Cayla Tapia  Registered Dietitian: Odessa Hines  Research: Светлана Jacinto  Respiratory Therapy: Joe Ramos  : Argelia Fitzgerald  : Guadalupe Garcia  Team: [facility], Team  UR// Supp. Assoc.: Wanda Domínguez  UR// Supp. Assoc.: Syd Page  UR// Supp. Assoc.: Nicole El

## 2023-06-21 NOTE — ED PROVIDER NOTE - OBJECTIVE STATEMENT
72-year-old female with a history of ESRD on HD (MWF), HLD, HTN, CHF presented to [facility] ED with shortness of breath.  History provided by daughter at the bedside.  Daughter reports that patient was seen at the cardiologist yesterday, was told that she had an ejection fraction of 20%.  Cardiologist advised patient to get a chest x-ray and was concerned about her being fluid overloaded.  Daughter was unable to take patient to obtain a chest x-ray and reports that her shortness of breath significantly worsened today.  Patient was last dialyzed yesterday but she was not completely dialyzed due to weakness.  Patient is on home O2 as needed but daughter states that for the last few days she has been using it 24 hours a day.  Patient is a former smoker, quit 9/2022 after smoking 1 PPD x 55 years.  She makes scant urine. PMD Calos Shay, Cardiology Cortez Friend  pt on BiPAP and require HD.   transferred to [place].

## 2023-06-21 NOTE — H&P ADULT - NSHPREVIEWOFSYSTEMS_GEN_ALL_CORE
CONSTITUTIONAL: + fatigue, +weakness, denies fever, chills  HEENT: denies sore throat  SKIN: denies rashes  CARDIOVASCULAR: denies chest pain, chest pressure, palpitations  RESPIRATORY: +shortness of breath, denies sputum production  GASTROINTESTINAL: +vomiting x1. denies nausea, diarrhea, abdominal pain  GENITOURINARY: denies dysuria  NEUROLOGICAL: denies numbness, headache, focal weakness  MUSCULOSKELETAL: denies muscle aches  HEMATOLOGIC: denies gross bleeding  LYMPHATICS: +extremity swelling  ENDOCRINOLOGIC: denies sweating, cold or heat intolerance

## 2023-06-21 NOTE — DISCHARGE NOTE PROVIDER - CARE PROVIDER_API CALL
Donte Cortes  Nephrology  [address], [address]  [place] [address]  Phone: [phone]  Fax: [phone]  Follow Up Time:

## 2023-06-21 NOTE — DISCHARGE NOTE NURSING/CASE MANAGEMENT/SOCIAL WORK - PATIENT PORTAL LINK FT
You can access the FollowMyHealth Patient Portal offered by [facility] by registering at the following website: [email or web address]. By joining [facility]’s FollowMyHealth portal, you will also be able to view your health information using other applications (apps) compatible with our system.

## 2023-06-21 NOTE — ED ADULT NURSE NOTE - OBJECTIVE STATEMENT
Transfer from [place] for dialysis; Pt presented to ED with SOB and weakness; received on bipap.  Pt began vomiting, placed on nasal cannula 4lpm- tolerating well at this time; received lasix 40mg ivp pta.  Left HD access.   and daughter at bedside.  Denies any chest pain.  Pt needs palliative consult to discuss goals of care with pt and family.  Pt is full code at this time.  Maintain comfort and safety.

## 2023-06-21 NOTE — H&P ADULT - NSHPSOCIALHISTORY_GEN_ALL_CORE
55 pack years quit september 2022  no alcohol / drug use  able to walk with walker at home, toilet and feeding on her own  needs help with iADLs from family

## 2023-06-21 NOTE — ED PROVIDER NOTE - RESPIRATORY, MLM
Decreased breath sounds b/l, R>L.  +Rales and crackles diffusely, no wheezing.  Tachypneic with acute respiratory distress

## 2023-06-21 NOTE — DISCHARGE NOTE PROVIDER - NSDCCPCAREPLAN_GEN_ALL_CORE_FT
PRINCIPAL DISCHARGE DIAGNOSIS  Diagnosis: Acute CHF  Assessment and Plan of Treatment: chr systolic chf  -  s/p iv lasix   transfer to [facility] under dr Shay  service.      SECONDARY DISCHARGE DIAGNOSES  Diagnosis: HLD (hyperlipidemia)  Assessment and Plan of Treatment: continue home meds/ transfer to [facility] .    Diagnosis: HTN (hypertension)  Assessment and Plan of Treatment: continue home meds /   [facility]  transfer  .    Diagnosis: End-stage renal disease (ESRD)  Assessment and Plan of Treatment: on hd -  moday , wednesday , friday  /[facility]  transfer  .

## 2023-06-21 NOTE — H&P ADULT - ASSESSMENT
71 y/o f pmhx ESRD on HD (MWF), HLD, HTN, CHF, aortic stenosis s/p TAVR, Scleroderma, presents with shortness of breath, admitted with volume overload 2/2 CHF, ESRD.

## 2023-06-21 NOTE — ED PROVIDER NOTE - CLINICAL SUMMARY MEDICAL DECISION MAKING FREE TEXT BOX
pt is a transfer from [place] ED for admit for CHF and ESRD on BiPAP.   case d/w Rosie and will admit

## 2023-06-21 NOTE — DISCHARGE NOTE PROVIDER - NSDCMRMEDTOKEN_GEN_ALL_CORE_FT
aspirin 81 mg oral delayed release tablet: 1 tab(s) orally once a day  atorvastatin 20 mg oral tablet: 1 tab(s) orally once a day (at bedtime)  levothyroxine 100 mcg (0.1 mg) oral capsule: 1 cap(s) orally once a day  mirtazapine 15 mg oral tablet: 1 tab(s) orally once a day (at bedtime)  pantoprazole 40 mg oral delayed release tablet: 1 tab(s) orally once a day (before a meal)  sevelamer carbonate 800 mg oral tablet: 1 tab(s) orally 3 times a day  verapamil 180 mg/12 hours oral tablet, extended release: 1 tab(s) orally once a day

## 2023-06-21 NOTE — PROGRESS NOTE ADULT - SUBJECTIVE AND OBJECTIVE BOX
Patient is a 72y old  Female who presents with a chief complaint of chf exac, dialysis (21 Jun 2023 09:52)    pt seen and examine today sob + , weak , no fever , tolerating po.   INTERVAL HPI/OVERNIGHT EVENTS:     T(C): 36.3 (06-21-23 @ 11:06), Max: 36.4 (06-21-23 @ 06:37)  HR: 102 (06-21-23 @ 11:06) (84 - 110)  BP: 145/87 (06-21-23 @ 11:06) (145/87 - 156/86)  RR: 20 (06-21-23 @ 11:06) (18 - 20)  SpO2: 100% (06-21-23 @ 11:06) (94% - 100%)  Wt(kg): --  I&O's Summary      REVIEW OF SYSTEMS:  CONSTITUTIONAL: No fever, weight loss, + fatigue  EYES: No eye pain, visual disturbances, or discharge  ENMT:  ; No sinus or throat pain  NECK: No pain or stiffness  BREASTS: No pain, no masses  RESPIRATORY: No cough, wheezing, chills  + shortness of breath  CARDIOVASCULAR: No chest pain, palpitations, dizziness, or leg swelling  GASTROINTESTINAL: No abdominal or epigastric pain. No nausea, vomiting, No diarrhea or constipation.   GENITOURINARY: No dysuria, frequency, hematuria, or incontinence  NEUROLOGICAL: No headaches, memory loss, loss of strength, numbness, or tremors  SKIN: No itching, burning, rashes, or lesions   MUSCULOSKELETAL: No joint pain or swelling; No muscle, back, or extremity pain    PHYSICAL EXAM:  GENERAL: NAD,   HEAD:  Atraumatic, Normocephalic  EYES: EOMI, PERRLA, conjunctiva and sclera clear  ENMT: Moist mucous membranes  NECK: Supple,   NERVOUS SYSTEM:  Alert & Oriented X3; Motor Strength 5/5 B/L upper and lower extremities; DTRs 2+ intact and symmetric  CHEST/LUNG:  percussion bilaterally;+ rales, no  rhonchi,  no wheezing,  HEART: Regular rate and rhythm; No murmurs, no tachy   ABDOMEN: Soft, Nontender, Nondistended; Bowel sounds present  EXTREMITIES:  2+ Peripheral Pulses, No clubbing, cyanosis, or edema  SKIN: No rashes or lesions    MEDICATIONS  (STANDING):  aspirin enteric coated 81 milliGRAM(s) Oral daily  atorvastatin 20 milliGRAM(s) Oral at bedtime  furosemide   Injectable 40 milliGRAM(s) IV Push two times a day  heparin   Injectable 5000 Unit(s) SubCutaneous every 8 hours  levothyroxine 100 MICROGram(s) Oral daily  mirtazapine 15 milliGRAM(s) Oral at bedtime  pantoprazole    Tablet 40 milliGRAM(s) Oral before breakfast  verapamil  milliGRAM(s) Oral daily    MEDICATIONS  (PRN):  acetaminophen     Tablet .. 650 milliGRAM(s) Oral every 6 hours PRN Temp greater or equal to 38C (100.4F), Mild Pain (1 - 3)  aluminum hydroxide/magnesium hydroxide/simethicone Suspension 30 milliLiter(s) Oral every 4 hours PRN Dyspepsia  melatonin 3 milliGRAM(s) Oral at bedtime PRN Insomnia  ondansetron Injectable 4 milliGRAM(s) IV Push every 8 hours PRN Nausea and/or Vomiting  sodium chloride 0.9% Bolus. 100 milliLiter(s) IV Bolus every 5 minutes PRN SBP LESS THAN or EQUAL to 90 mmHg      LABS:                        10.5   4.39  )-----------( 127      ( 21 Jun 2023 06:11 )             33.9     06-21    128<L>  |  93<L>  |  48<H>  ----------------------------<  148<H>  5.3   |  21<L>  |  4.30<H>    Ca    8.9      21 Jun 2023 06:11  Phos  6.9     06-21  Mg     2.0     06-21    TPro  7.7  /  Alb  3.6  /  TBili  0.6  /  DBili  x   /  AST  39<H>  /  ALT  29  /  AlkPhos  78  06-21      Urinalysis Basic - ( 21 Jun 2023 06:11 )    Color: x / Appearance: x / SG: x / pH: x  Gluc: 148 mg/dL / Ketone: x  / Bili: x / Urobili: x   Blood: x / Protein: x / Nitrite: x   Leuk Esterase: x / RBC: x / WBC x   Sq Epi: x / Non Sq Epi: x / Bacteria: x      CAPILLARY BLOOD GLUCOSE        ABG - ( 21 Jun 2023 04:56 )  pH, Arterial: 7.37  pH, Blood: x     /  pCO2: 29    /  pO2: 159   / HCO3: 17    / Base Excess: -8.5  /  SaO2: 100.0                     RADIOLOGY & ADDITIONAL TESTS:    Imaging Personally Reviewed:     no new test   Advance Directives:  full code    Palliative Care:  Appropriate

## 2023-06-21 NOTE — ED ADULT NURSE NOTE - NSFALLHARMRISKINTERV_ED_ALL_ED

## 2023-06-21 NOTE — ED PROVIDER NOTE - OBJECTIVE STATEMENT
72-year-old female with a history of ESRD on HD (MWF), HLD, HTN, CHF presents with shortness of breath.  History provided by daughter at the bedside.  Daughter reports that patient was seen at the cardiologist yesterday, was told that she had an ejection fraction of 20%.  Cardiologist advised patient to get a chest x-ray and was concerned about her being fluid overloaded.  Daughter was unable to take patient to obtain a chest x-ray and reports that her shortness of breath significantly worsened today.  Patient was last dialyzed yesterday but she was not completely dialyzed due to weakness.  Patient is on home O2 as needed but daughter states that for the last few days she has been using it 24 hours a day.  PMD Calos Shay, Cardiology Cortez Friend 72-year-old female with a history of ESRD on HD (MWF), HLD, HTN, CHF presents with shortness of breath.  History provided by daughter at the bedside.  Daughter reports that patient was seen at the cardiologist yesterday, was told that she had an ejection fraction of 20%.  Cardiologist advised patient to get a chest x-ray and was concerned about her being fluid overloaded.  Daughter was unable to take patient to obtain a chest x-ray and reports that her shortness of breath significantly worsened today.  Patient was last dialyzed yesterday but she was not completely dialyzed due to weakness.  Patient is on home O2 as needed but daughter states that for the last few days she has been using it 24 hours a day.  Patient is a former smoker, quit 9/2022 after smoking 1 PPD x 55 years.  She makes scant urine. PMD Calos Shay, Cardiology Cortez Friend

## 2023-06-21 NOTE — CARE COORDINATION ASSESSMENT. - OTHER PERTINENT DISCHARGE PLANNING INFORMATION:
Discussed today at rounds. Patient appears alert and oriented but not up to talking. Agreed I could speak with daughter Bernadette who was at bedside. Daughter states she lives with spouse. Another daughter Joanne who is HCP lives next door. She walked with walker at home & also has a cane. She has home oxygen including portable tank that she uses as needed. She 's had HCS in November thru [facility]. She goes to [facility] for dialysis tx Mon/Wed/Friday 9:30AM chair. Spouse drives her to & from tx. Patient identified as a CMS STAR patient. Provided contact for [facility] Transitional Care Management Team to daughter. Educated daughter re. role of social work & provided d/c planning folder including social work name & #. Daughter states she prefers [facility] for HCS. Message left at [facility] to confirm chair and obtain fax #, await return call. Social work to follow.

## 2023-06-21 NOTE — PROGRESS NOTE ADULT - PROBLEM SELECTOR PLAN 3
dialysis MWF. kidney disease reportedly 2/2 scleroderma  - Monday dialysis w removal of 2.8L  - Patient with some scant urine output  - Nephro consulted (Sophia)  / today hd

## 2023-06-21 NOTE — ED PROVIDER NOTE - MUSCULOSKELETAL, MLM
Spine appears normal, range of motion is not limited, no muscle or joint tenderness , 1+ LE pitting edema b/l

## 2023-06-21 NOTE — DISCHARGE NOTE PROVIDER - HOSPITAL COURSE
· Assessment	  71 y/o f pmhx ESRD on HD (MWF), HLD, HTN, CHF, aortic stenosis s/p TAVR, Scleroderma, presents with shortness of breath, admitted with volume overload 2/2 CHF, ESRD.     Acute on chronic systolic congestive heart failure-  Patient presents with shortness of breath likely volume overload 2/2 acute on chronic CHF exacerbation concomitant with ESRD  - CXR: small b/l pleural effusion    on wet read w/ calcified aortic knob  appears significantly volume overloaded with increased work of breathing, intermittently regurgitating frothy liquid  and  Lasix 40 mg IVP q12h with hold parameters   - Supplemental O2 to maintain O2 sat >90%  , ABG: Ph 7.37, pCO2 29, bicarb 17   and  pro BNP >70,000 - (higher than upper limit likely 2/2 ESRD)  - TTE from [facility] with 20% ef- Strict I/Os, daily weight ,  Cardiology consulted ([facility]., Elevated troponin  troponin ~200 on admissionmildly elevated trop but does not have sig cad, likely demand from hf and decr renal clearance .  EKG without new ischemic changes   patient does complain of some chest discomfort but denies significant chest pain  will trend to peak very likely demand ischemia. Pt also with ESRD which decreases clearance of troponin in the bloodstream.  ESRD on dialysis.  dialysis MWF. kidney disease reportedly 2/2 scleroderma   ,  Monday dialysis w removal of 2.8L  - Patient with some scant urine output  and  Nephro consulted (Sophia)    pt family requested further  treat   plan     at [facility]   transfer  under pt  PCP  DR Shay  SERVICE .   Vital Signs Last 24 Hrs  T(C): 36.3 (21 Jun 2023 11:06), Max: 36.7 (20 Jun 2023 21:18)  T(F): 97.4 (21 Jun 2023 11:06), Max: 98.1 (20 Jun 2023 21:18)  HR: 102 (21 Jun 2023 11:06) (84 - 114)  BP: 145/87 (21 Jun 2023 11:06) (145/87 - 164/89)  BP(mean): --  RR: 20 (21 Jun 2023 11:06) (18 - 26)  SpO2: 100% (21 Jun 2023 11:06) (94% - 100%)    Parameters below as of 21 Jun 2023 11:06  Patient On (Oxygen Delivery Method): nasal cannula  O2 Flow (L/min): 4    Physical Exam:  General: in mild distress   HEENT: NCAT, PERRLA, EOMI bl, moist mucous membranes   Neck: Supple, nontender, no mass  Neurology: A&Ox3, nonfocal, sensation intact   Respiratory: faint crackles in bilateral bases  CV: RRR, +S1/S2, mechanical murmur present  Abdominal: Soft, NT, ND +BSx4, no palpable masses  Extremities: bilateral 1+ pitting edema in lower extremities up to shins, + peripheral pulses  MSK: Normal ROM, no joint erythema or warmth, no joint swelling   gu intact     Skin: warm, dry, normal colortment  in [facility]  under   medical doctor /  pcp  DR Shay  service .

## 2023-11-13 PROBLEM — Z87.39 PERSONAL HISTORY OF OTHER DISEASES OF THE MUSCULOSKELETAL SYSTEM AND CONNECTIVE TISSUE: Chronic | Status: ACTIVE | Noted: 2023-01-01

## 2023-11-13 PROBLEM — I10 ESSENTIAL (PRIMARY) HYPERTENSION: Chronic | Status: ACTIVE | Noted: 2023-01-01

## 2023-11-13 PROBLEM — I35.0 NONRHEUMATIC AORTIC (VALVE) STENOSIS: Chronic | Status: ACTIVE | Noted: 2023-01-01

## 2023-11-13 PROBLEM — I50.9 HEART FAILURE, UNSPECIFIED: Chronic | Status: ACTIVE | Noted: 2023-01-01

## 2023-11-13 PROBLEM — Z87.09 PERSONAL HISTORY OF OTHER DISEASES OF THE RESPIRATORY SYSTEM: Chronic | Status: ACTIVE | Noted: 2023-01-01

## 2023-11-13 PROBLEM — N18.6 END STAGE RENAL DISEASE: Chronic | Status: ACTIVE | Noted: 2023-01-01

## 2023-11-13 PROBLEM — E78.5 HYPERLIPIDEMIA, UNSPECIFIED: Chronic | Status: ACTIVE | Noted: 2023-01-01

## 2023-11-17 PROBLEM — M87.9 OSTEONECROSIS OF RIGHT HIP: Status: ACTIVE | Noted: 2023-01-01

## 2023-11-22 PROBLEM — F17.210 SMOKES WITH GREATER THAN 30 PACK YEAR HISTORY: Status: ACTIVE | Noted: 2019-10-16

## 2023-11-24 NOTE — ED PROVIDER NOTE - NSICDXPASTMEDICALHX_GEN_ALL_CORE_FT
PAST MEDICAL HISTORY:  Aortic stenosis     Chronic CHF     Congestive heart failure (CHF)     ESRD on dialysis     ESRD on dialysis     H/O scleroderma     History of COPD     History of scleroderma     HLD (hyperlipidemia)     HLD (hyperlipidemia)     HTN (hypertension)

## 2023-11-24 NOTE — ED PROVIDER NOTE - OBJECTIVE STATEMENT
Patient brought to hospital and cardiac arrest.  According to family patient was not feeling well today.  Was complaining of pain all over and has been vomiting for the past 2 hours.  On the way to the hospital in the car patient vomited again and went into full cardiac arrest.  EMS was called and CPR was started patient arrived here with CPR in progress.  Patient had received 3 epis and a bicarb prior to arrival.  Efforts continued here.  On arrival the family does confirm the patient had a DNR order.  Patient pronounced at 00 50 9 AM

## 2023-11-24 NOTE — ED ADULT TRIAGE NOTE - CHIEF COMPLAINT QUOTE
PT BIB EMS from car in cardiac arrest; witnessed arrest by family; pt family was driving pt to [facility] when she started vomiting and went into cardiac arrest; EMS was called; ACLS protocol initiated by EMS and continued upon arrival to ED; pt received 4 epi IVP and 1 bicarb prior to arrival to ED

## 2023-11-24 NOTE — ED ADULT NURSE NOTE - CHIEF COMPLAINT QUOTE
PT BIB EMS from car in cardiac arrest; witnessed arrest by family; pt family was driving pt to [facility] when she started vomiting and went into cardiac arrest; EMS was called; ACLS protocol initiated by EMS and continued upon arrival to ED; pt received 4 epi IVP and 1 bicarb prior to arrival to ED

## 2023-11-24 NOTE — DISCHARGE NOTE FOR THE EXPIRED PATIENT - REASON FOR ADMISSION
Pt brought to the ED by ambulance in cardiac arrest, ACLS protocol is in progress. According to family pt was not feeling well today, daughter noticed pt was wheezing this evening, decided to drive to [place] when pt became unresponsive on the way to hospital. Pt has emesis over face and in her hair, face is swollen, cyanotic, extremities are cold.

## 2023-11-24 NOTE — ED ADULT NURSE NOTE - NSICDXPASTSURGICALHX_GEN_ALL_CORE_FT
PAST SURGICAL HISTORY:  H/O knee surgery     S/P TAVR (transcatheter aortic valve replacement)      PAST SURGICAL HISTORY:  H/O knee surgery     Presence of externally removable percutaneous endoscopic gastrostomy (PEG) tube     S/P TAVR (transcatheter aortic valve replacement)

## 2024-01-26 NOTE — PROGRESS NOTE ADULT - PROBLEM/PLAN-3
DISPLAY PLAN FREE TEXT
No change

## 2024-08-25 NOTE — ED ADULT NURSE NOTE - GLASGOW COMA SCALE: EYE OPENING
Bed: 22  Expected date:   Expected time:   Means of arrival:   Comments:  1st triage   (E4) spontaneous

## 2024-09-11 NOTE — PROGRESS NOTE ADULT - SUBJECTIVE AND OBJECTIVE BOX
noted   *******************************  Jesús Fonseca MD (PGY-1)  Internal Medicine  Contact via [employer] TEAMS  [facility] Pager: [phone]  [facility] Pager: [phone]  *******************************    PROGRESS NOTE:     Patient is a 72y old  Female who presents with a chief complaint of SOB (05 Oct 2022 17:34)    INTERVAL EVENTS: IR renal bx cancelled 10/5 due to elevated BPs. Otherwise, no acute overnight events. BPs 130-160 overnight.    SUBJECTIVE: Patient seen and examined at bedside. This morning, the patient is comfortable and doing well. No acute complaints. Denies fevers, chills, N/V/D, chest pain, SOB, abdominal pain.    MEDICATIONS  (STANDING):  allopurinol 100 milliGRAM(s) Oral daily  captopril 25 milliGRAM(s) Oral three times a day  carvedilol 6.25 milliGRAM(s) Oral every 12 hours  cephalexin 500 milliGRAM(s) Oral every 12 hours  felodipine ER 2.5 milliGRAM(s) Oral daily  lactobacillus acidophilus 1 Tablet(s) Oral daily  levothyroxine 75 MICROGram(s) Oral daily  LORazepam     Tablet 0.5 milliGRAM(s) Oral once  polyethylene glycol 3350 17 Gram(s) Oral daily  predniSONE   Tablet 7.5 milliGRAM(s) Oral daily  senna 2 Tablet(s) Oral at bedtime  sodium bicarbonate 650 milliGRAM(s) Oral three times a day    MEDICATIONS  (PRN):  acetaminophen     Tablet .. 650 milliGRAM(s) Oral every 6 hours PRN Temp greater or equal to 38C (100.4F), Mild Pain (1 - 3)  melatonin 3 milliGRAM(s) Oral at bedtime PRN Insomnia  nicotine - Inhaler 1 Each Inhalation every 4 hours PRN nicotine dependence  nitroglycerin     SubLingual 0.4 milliGRAM(s) SubLingual every 5 minutes PRN Chest Pain    CAPILLARY BLOOD GLUCOSE    I&O's Summary    05 Oct 2022 07:01  -  06 Oct 2022 07:00  --------------------------------------------------------  IN: 240 mL / OUT: 0 mL / NET: 240 mL    PHYSICAL EXAM:  Vital Signs Last 24 Hrs  T(C): 37.2 (06 Oct 2022 04:18), Max: 37.2 (06 Oct 2022 04:18)  T(F): 98.9 (06 Oct 2022 04:18), Max: 98.9 (06 Oct 2022 04:18)  HR: 71 (06 Oct 2022 04:18) (60 - 72)  BP: 160/62 (06 Oct 2022 04:18) (130/76 - 160/62)  BP(mean): --  RR: 18 (06 Oct 2022 04:18) (17 - 18)  SpO2: 96% (06 Oct 2022 04:18) (96% - 99%)    Parameters below as of 06 Oct 2022 04:18  Patient On (Oxygen Delivery Method): room air    GENERAL: NAD, lying in bed comfortably  HEART: S1, S2, Regular rate and rhythm, no murmurs, rubs, or gallops  LUNGS: Unlabored respirations, clear to auscultation bilaterally, no crackles, wheezing, or rhonchi  ABDOMEN: Soft, nontender, nondistended, +BS  EXTREMITIES: 2+ peripheral pulses bilaterally. No clubbing, cyanosis, or edema  NERVOUS SYSTEM:  A&Ox3, no focal deficits   SKIN: No rashes or lesions    LABS:                        9.7    8.29  )-----------( 295      ( 06 Oct 2022 04:13 )             28.8     10-06    128<L>  |  96  |  61<H>  ----------------------------<  93  3.9   |  19<L>  |  2.04<H>    Ca    8.9      06 Oct 2022 04:13  Phos  3.8     10-06  Mg     1.8     10-06    TPro  7.2  /  Alb  3.5  /  TBili  0.3  /  DBili  x   /  AST  15  /  ALT  21  /  AlkPhos  44  10-06    PT/INR - ( 06 Oct 2022 04:13 )   PT: 12.8 sec;   INR: 1.10 ratio    PTT - ( 06 Oct 2022 04:13 )  PTT:26.6 sec    RADIOLOGY & ADDITIONAL TESTS:  Results Reviewed:   Imaging Personally Reviewed:  Electrocardiogram Personally Reviewed:  Tele:    COORDINATION OF CARE:  Care Discussed with Consultants/Other Providers [Y/N]:  Prior or Outpatient Records Reviewed [Y/N]:   *******************************  Jesús Fonseca MD (PGY-1)  Internal Medicine  Contact via [employer] TEAMS  [facility] Pager: [phone]  [facility] Pager: [phone]  *******************************    PROGRESS NOTE:     Patient is a 72y old  Female who presents with a chief complaint of SOB (05 Oct 2022 17:34)    INTERVAL EVENTS: IR renal bx cancelled 10/5 due to elevated BPs. Otherwise, no acute overnight events. BPs 130-160 overnight.    SUBJECTIVE: Patient seen and examined at bedside. This morning, the patient is comfortable and doing well. No acute complaints. Denies fevers, chills, N/V/D, chest pain, SOB, abdominal pain.    MEDICATIONS  (STANDING):  allopurinol 100 milliGRAM(s) Oral daily  captopril 25 milliGRAM(s) Oral three times a day  cephalexin 500 milliGRAM(s) Oral every 12 hours  felodipine ER 2.5 milliGRAM(s) Oral daily  lactobacillus acidophilus 1 Tablet(s) Oral daily  levothyroxine 75 MICROGram(s) Oral daily  LORazepam     Tablet 0.5 milliGRAM(s) Oral once  polyethylene glycol 3350 17 Gram(s) Oral daily  predniSONE   Tablet 7.5 milliGRAM(s) Oral daily  senna 2 Tablet(s) Oral at bedtime  sodium bicarbonate 650 milliGRAM(s) Oral three times a day    MEDICATIONS  (PRN):  acetaminophen     Tablet .. 650 milliGRAM(s) Oral every 6 hours PRN Temp greater or equal to 38C (100.4F), Mild Pain (1 - 3)  melatonin 3 milliGRAM(s) Oral at bedtime PRN Insomnia  nicotine - Inhaler 1 Each Inhalation every 4 hours PRN nicotine dependence  nitroglycerin     SubLingual 0.4 milliGRAM(s) SubLingual every 5 minutes PRN Chest Pain    CAPILLARY BLOOD GLUCOSE    I&O's Summary    05 Oct 2022 07:01  -  06 Oct 2022 07:00  --------------------------------------------------------  IN: 240 mL / OUT: 0 mL / NET: 240 mL    PHYSICAL EXAM:  Vital Signs Last 24 Hrs  T(C): 37.2 (06 Oct 2022 04:18), Max: 37.2 (06 Oct 2022 04:18)  T(F): 98.9 (06 Oct 2022 04:18), Max: 98.9 (06 Oct 2022 04:18)  HR: 71 (06 Oct 2022 04:18) (60 - 72)  BP: 160/62 (06 Oct 2022 04:18) (130/76 - 160/62)  BP(mean): --  RR: 18 (06 Oct 2022 04:18) (17 - 18)  SpO2: 96% (06 Oct 2022 04:18) (96% - 99%)    Parameters below as of 06 Oct 2022 04:18  Patient On (Oxygen Delivery Method): room air    GENERAL: NAD, lying in bed comfortably  HEART: S1, S2, Regular rate and rhythm, no murmurs, rubs, or gallops  LUNGS: Unlabored respirations, clear to auscultation bilaterally, no crackles, wheezing, or rhonchi  ABDOMEN: Soft, nontender, nondistended, +BS  EXTREMITIES: 2+ peripheral pulses bilaterally. No clubbing, cyanosis, or edema  NERVOUS SYSTEM:  A&Ox3, no focal deficits   SKIN: No rashes or lesions    LABS:                        9.7    8.29  )-----------( 295      ( 06 Oct 2022 04:13 )             28.8     10-06    128<L>  |  96  |  61<H>  ----------------------------<  93  3.9   |  19<L>  |  2.04<H>    Ca    8.9      06 Oct 2022 04:13  Phos  3.8     10-06  Mg     1.8     10-06    TPro  7.2  /  Alb  3.5  /  TBili  0.3  /  DBili  x   /  AST  15  /  ALT  21  /  AlkPhos  44  10-06    PT/INR - ( 06 Oct 2022 04:13 )   PT: 12.8 sec;   INR: 1.10 ratio    PTT - ( 06 Oct 2022 04:13 )  PTT:26.6 sec    RADIOLOGY & ADDITIONAL TESTS:  Results Reviewed:   Imaging Personally Reviewed:  Electrocardiogram Personally Reviewed:  Tele:    COORDINATION OF CARE:  Care Discussed with Consultants/Other Providers [Y/N]:  Prior or Outpatient Records Reviewed [Y/N]:   *******************************  Jesús Fonseca MD (PGY-1)  Internal Medicine  Contact via [employer] TEAMS  [facility] Pager: [phone]  [facility] Pager: [phone]  *******************************    PROGRESS NOTE:     Patient is a 72y old  Female who presents with a chief complaint of SOB (05 Oct 2022 17:34)    INTERVAL EVENTS: IR renal bx cancelled 10/5 due to elevated BPs. Otherwise, no acute overnight events. BPs 130-160 overnight.    SUBJECTIVE: Patient seen and examined at bedside. This morning, the patient is comfortable and doing well. No acute complaints. Wishes to go home as soon as possible. Denies fevers, chills, N/V/D, chest pain, SOB, abdominal pain.    MEDICATIONS  (STANDING):  allopurinol 100 milliGRAM(s) Oral daily  captopril 25 milliGRAM(s) Oral three times a day  cephalexin 500 milliGRAM(s) Oral every 12 hours  felodipine ER 2.5 milliGRAM(s) Oral daily  lactobacillus acidophilus 1 Tablet(s) Oral daily  levothyroxine 75 MICROGram(s) Oral daily  LORazepam     Tablet 0.5 milliGRAM(s) Oral once  polyethylene glycol 3350 17 Gram(s) Oral daily  predniSONE   Tablet 7.5 milliGRAM(s) Oral daily  senna 2 Tablet(s) Oral at bedtime  sodium bicarbonate 650 milliGRAM(s) Oral three times a day    MEDICATIONS  (PRN):  acetaminophen     Tablet .. 650 milliGRAM(s) Oral every 6 hours PRN Temp greater or equal to 38C (100.4F), Mild Pain (1 - 3)  melatonin 3 milliGRAM(s) Oral at bedtime PRN Insomnia  nicotine - Inhaler 1 Each Inhalation every 4 hours PRN nicotine dependence  nitroglycerin     SubLingual 0.4 milliGRAM(s) SubLingual every 5 minutes PRN Chest Pain    CAPILLARY BLOOD GLUCOSE    I&O's Summary    05 Oct 2022 07:01  -  06 Oct 2022 07:00  --------------------------------------------------------  IN: 240 mL / OUT: 0 mL / NET: 240 mL    PHYSICAL EXAM:  Vital Signs Last 24 Hrs  T(C): 37.2 (06 Oct 2022 04:18), Max: 37.2 (06 Oct 2022 04:18)  T(F): 98.9 (06 Oct 2022 04:18), Max: 98.9 (06 Oct 2022 04:18)  HR: 71 (06 Oct 2022 04:18) (60 - 72)  BP: 160/62 (06 Oct 2022 04:18) (130/76 - 160/62)  BP(mean): --  RR: 18 (06 Oct 2022 04:18) (17 - 18)  SpO2: 96% (06 Oct 2022 04:18) (96% - 99%)    Parameters below as of 06 Oct 2022 04:18  Patient On (Oxygen Delivery Method): room air    GENERAL: NAD, lying in bed comfortably  HEART: S1, S2, Regular rate and rhythm, no murmurs, rubs, or gallops  LUNGS: Unlabored respirations, clear to auscultation bilaterally, no crackles, wheezing, or rhonchi  ABDOMEN: Soft, nontender, nondistended, +BS  EXTREMITIES: 2+ peripheral pulses bilaterally. No clubbing, cyanosis, or edema  NERVOUS SYSTEM:  A&Ox3, no focal deficits   SKIN: No rashes or lesions    LABS:                        9.7    8.29  )-----------( 295      ( 06 Oct 2022 04:13 )             28.8     10-06    128<L>  |  96  |  61<H>  ----------------------------<  93  3.9   |  19<L>  |  2.04<H>    Ca    8.9      06 Oct 2022 04:13  Phos  3.8     10-06  Mg     1.8     10-06    TPro  7.2  /  Alb  3.5  /  TBili  0.3  /  DBili  x   /  AST  15  /  ALT  21  /  AlkPhos  44  10-06    PT/INR - ( 06 Oct 2022 04:13 )   PT: 12.8 sec;   INR: 1.10 ratio    PTT - ( 06 Oct 2022 04:13 )  PTT:26.6 sec    RADIOLOGY & ADDITIONAL TESTS:  Results Reviewed:   Imaging Personally Reviewed:  Electrocardiogram Personally Reviewed:  Tele:    COORDINATION OF CARE:  Care Discussed with Consultants/Other Providers [Y/N]:  Prior or Outpatient Records Reviewed [Y/N]:

## 2025-03-04 NOTE — DISCHARGE NOTE NURSING/CASE MANAGEMENT/SOCIAL WORK - NSTOBACCONEVERSMOKERY/N_GEN_A
Quality 130: Documentation Of Current Medications In The Medical Record: Current Medications Documented Quality 431: Preventive Care And Screening: Unhealthy Alcohol Use - Screening: Patient not identified as an unhealthy alcohol user when screened for unhealthy alcohol use using a systematic screening method Quality 226: Preventive Care And Screening: Tobacco Use: Screening And Cessation Intervention: Patient screened for tobacco use and is an ex/non-smoker Detail Level: Detailed Yes

## 2025-07-23 NOTE — ED ADULT NURSE NOTE - SUICIDE SCREENING QUESTION 2
IH=844 bpm, CRHG=868/83 mmhg, SpO2=89.0 %, Resp=16 B/min, EtCO2=38 mmHg, Apnea=2 Seconds, Byron=2 Patient unable to complete